# Patient Record
Sex: MALE | Race: WHITE | NOT HISPANIC OR LATINO | Employment: OTHER | URBAN - METROPOLITAN AREA
[De-identification: names, ages, dates, MRNs, and addresses within clinical notes are randomized per-mention and may not be internally consistent; named-entity substitution may affect disease eponyms.]

---

## 2017-04-18 ENCOUNTER — OFFICE VISIT - RIVER FALLS (OUTPATIENT)
Dept: FAMILY MEDICINE | Facility: CLINIC | Age: 72
End: 2017-04-18

## 2017-04-18 ASSESSMENT — MIFFLIN-ST. JEOR: SCORE: 1482.88

## 2017-04-20 ENCOUNTER — OFFICE VISIT - RIVER FALLS (OUTPATIENT)
Dept: FAMILY MEDICINE | Facility: CLINIC | Age: 72
End: 2017-04-20

## 2017-04-20 ENCOUNTER — TRANSFERRED RECORDS (OUTPATIENT)
Dept: HEALTH INFORMATION MANAGEMENT | Facility: CLINIC | Age: 72
End: 2017-04-20

## 2017-04-20 LAB — GLUCOSE (EXTERNAL): 220 MG/DL (ref 65–99)

## 2017-05-30 ENCOUNTER — OFFICE VISIT - RIVER FALLS (OUTPATIENT)
Dept: FAMILY MEDICINE | Facility: CLINIC | Age: 72
End: 2017-05-30

## 2017-05-30 ASSESSMENT — MIFFLIN-ST. JEOR: SCORE: 1495.36

## 2017-06-13 ENCOUNTER — OFFICE VISIT - RIVER FALLS (OUTPATIENT)
Dept: FAMILY MEDICINE | Facility: CLINIC | Age: 72
End: 2017-06-13

## 2017-08-03 ENCOUNTER — AMBULATORY - RIVER FALLS (OUTPATIENT)
Dept: FAMILY MEDICINE | Facility: CLINIC | Age: 72
End: 2017-08-03

## 2017-08-04 LAB
CHOLEST SERPL-MCNC: 195 MG/DL (ref 125–200)
CHOLEST/HDLC SERPL: 2.2 {RATIO}
HBA1C MFR BLD: 8 %
HDLC SERPL-MCNC: 89 MG/DL
LDLC SERPL CALC-MCNC: 98 MG/DL
NONHDLC SERPL-MCNC: 106 MG/DL
TRIGL SERPL-MCNC: 38 MG/DL

## 2017-08-11 ENCOUNTER — OFFICE VISIT - RIVER FALLS (OUTPATIENT)
Dept: FAMILY MEDICINE | Facility: CLINIC | Age: 72
End: 2017-08-11

## 2017-08-11 ASSESSMENT — MIFFLIN-ST. JEOR: SCORE: 1506.7

## 2017-09-01 ENCOUNTER — OFFICE VISIT - RIVER FALLS (OUTPATIENT)
Dept: FAMILY MEDICINE | Facility: CLINIC | Age: 72
End: 2017-09-01

## 2017-09-01 ASSESSMENT — MIFFLIN-ST. JEOR: SCORE: 1516.9

## 2017-12-21 ENCOUNTER — AMBULATORY - RIVER FALLS (OUTPATIENT)
Dept: FAMILY MEDICINE | Facility: CLINIC | Age: 72
End: 2017-12-21

## 2017-12-22 ENCOUNTER — OFFICE VISIT - RIVER FALLS (OUTPATIENT)
Dept: FAMILY MEDICINE | Facility: CLINIC | Age: 72
End: 2017-12-22

## 2017-12-22 LAB — HBA1C MFR BLD: 8 %

## 2017-12-22 ASSESSMENT — MIFFLIN-ST. JEOR: SCORE: 1532.78

## 2017-12-28 ENCOUNTER — OFFICE VISIT - RIVER FALLS (OUTPATIENT)
Dept: FAMILY MEDICINE | Facility: CLINIC | Age: 72
End: 2017-12-28

## 2018-02-01 ENCOUNTER — OFFICE VISIT - RIVER FALLS (OUTPATIENT)
Dept: FAMILY MEDICINE | Facility: CLINIC | Age: 73
End: 2018-02-01

## 2018-05-07 ENCOUNTER — AMBULATORY - RIVER FALLS (OUTPATIENT)
Dept: FAMILY MEDICINE | Facility: CLINIC | Age: 73
End: 2018-05-07

## 2018-05-08 LAB
CREAT SERPL-MCNC: 1 MG/DL (ref 0.7–1.18)
GLUCOSE BLD-MCNC: 429 MG/DL (ref 65–99)
HBA1C MFR BLD: 7.7 %

## 2018-05-15 ENCOUNTER — OFFICE VISIT - RIVER FALLS (OUTPATIENT)
Dept: FAMILY MEDICINE | Facility: CLINIC | Age: 73
End: 2018-05-15

## 2018-09-14 ENCOUNTER — AMBULATORY - RIVER FALLS (OUTPATIENT)
Dept: FAMILY MEDICINE | Facility: CLINIC | Age: 73
End: 2018-09-14

## 2018-09-15 LAB
CHOLEST SERPL-MCNC: 197 MG/DL
CHOLEST/HDLC SERPL: 2.4 {RATIO}
HBA1C MFR BLD: 8.1 %
HDLC SERPL-MCNC: 82 MG/DL
LDLC SERPL CALC-MCNC: 100 MG/DL
NONHDLC SERPL-MCNC: 115 MG/DL
TRIGL SERPL-MCNC: 65 MG/DL

## 2018-09-20 ENCOUNTER — OFFICE VISIT - RIVER FALLS (OUTPATIENT)
Dept: FAMILY MEDICINE | Facility: CLINIC | Age: 73
End: 2018-09-20

## 2019-04-26 ENCOUNTER — AMBULATORY - RIVER FALLS (OUTPATIENT)
Dept: FAMILY MEDICINE | Facility: CLINIC | Age: 74
End: 2019-04-26

## 2019-04-27 LAB
BUN SERPL-MCNC: 18 MG/DL (ref 7–25)
BUN/CREAT RATIO - HISTORICAL: ABNORMAL (ref 6–22)
CALCIUM SERPL-MCNC: 9.3 MG/DL (ref 8.6–10.3)
CHLORIDE BLD-SCNC: 100 MMOL/L (ref 98–110)
CO2 SERPL-SCNC: 26 MMOL/L (ref 20–32)
CREAT SERPL-MCNC: 0.9 MG/DL (ref 0.7–1.18)
CREAT UR-MCNC: 152 MG/DL (ref 20–320)
EGFRCR SERPLBLD CKD-EPI 2021: 84 ML/MIN/1.73M2
GLUCOSE BLD-MCNC: 209 MG/DL (ref 65–99)
HBA1C MFR BLD: 8.3 %
MICROALBUMIN UR-MCNC: 0.3 MG/DL
MICROALBUMIN/CREAT UR: 2 MG/G{CREAT}
POTASSIUM BLD-SCNC: 4.4 MMOL/L (ref 3.5–5.3)
SODIUM SERPL-SCNC: 135 MMOL/L (ref 135–146)

## 2019-05-03 ENCOUNTER — OFFICE VISIT - RIVER FALLS (OUTPATIENT)
Dept: FAMILY MEDICINE | Facility: CLINIC | Age: 74
End: 2019-05-03

## 2019-06-03 ENCOUNTER — OFFICE VISIT - RIVER FALLS (OUTPATIENT)
Dept: FAMILY MEDICINE | Facility: CLINIC | Age: 74
End: 2019-06-03

## 2019-06-03 ASSESSMENT — MIFFLIN-ST. JEOR: SCORE: 1533.01

## 2019-06-06 ENCOUNTER — OFFICE VISIT - RIVER FALLS (OUTPATIENT)
Dept: FAMILY MEDICINE | Facility: CLINIC | Age: 74
End: 2019-06-06

## 2019-06-07 ENCOUNTER — COMMUNICATION - RIVER FALLS (OUTPATIENT)
Dept: FAMILY MEDICINE | Facility: CLINIC | Age: 74
End: 2019-06-07

## 2019-06-14 ENCOUNTER — COMMUNICATION - RIVER FALLS (OUTPATIENT)
Dept: FAMILY MEDICINE | Facility: CLINIC | Age: 74
End: 2019-06-14

## 2019-06-24 ENCOUNTER — COMMUNICATION - RIVER FALLS (OUTPATIENT)
Dept: FAMILY MEDICINE | Facility: CLINIC | Age: 74
End: 2019-06-24

## 2019-06-25 ENCOUNTER — COMMUNICATION - RIVER FALLS (OUTPATIENT)
Dept: FAMILY MEDICINE | Facility: CLINIC | Age: 74
End: 2019-06-25

## 2019-07-08 ENCOUNTER — OFFICE VISIT - RIVER FALLS (OUTPATIENT)
Dept: FAMILY MEDICINE | Facility: CLINIC | Age: 74
End: 2019-07-08

## 2019-07-08 ASSESSMENT — MIFFLIN-ST. JEOR: SCORE: 1521.21

## 2019-09-13 ENCOUNTER — AMBULATORY - RIVER FALLS (OUTPATIENT)
Dept: FAMILY MEDICINE | Facility: CLINIC | Age: 74
End: 2019-09-13

## 2019-09-14 LAB
CHOLEST SERPL-MCNC: 205 MG/DL
CHOLEST/HDLC SERPL: 2.9 {RATIO}
HBA1C MFR BLD: 8.5 %
HDLC SERPL-MCNC: 71 MG/DL
LDLC SERPL CALC-MCNC: 118 MG/DL
NONHDLC SERPL-MCNC: 134 MG/DL
TRIGL SERPL-MCNC: 68 MG/DL
TSH SERPL DL<=0.005 MIU/L-ACNC: 0.1 MIU/L (ref 0.4–4.5)

## 2019-09-16 ENCOUNTER — COMMUNICATION - RIVER FALLS (OUTPATIENT)
Dept: FAMILY MEDICINE | Facility: CLINIC | Age: 74
End: 2019-09-16

## 2019-09-20 ENCOUNTER — OFFICE VISIT - RIVER FALLS (OUTPATIENT)
Dept: FAMILY MEDICINE | Facility: CLINIC | Age: 74
End: 2019-09-20

## 2019-09-23 ENCOUNTER — COMMUNICATION - RIVER FALLS (OUTPATIENT)
Dept: FAMILY MEDICINE | Facility: CLINIC | Age: 74
End: 2019-09-23

## 2019-09-24 ENCOUNTER — COMMUNICATION - RIVER FALLS (OUTPATIENT)
Dept: FAMILY MEDICINE | Facility: CLINIC | Age: 74
End: 2019-09-24

## 2019-09-25 ENCOUNTER — COMMUNICATION - RIVER FALLS (OUTPATIENT)
Dept: FAMILY MEDICINE | Facility: CLINIC | Age: 74
End: 2019-09-25

## 2019-09-26 ENCOUNTER — COMMUNICATION - RIVER FALLS (OUTPATIENT)
Dept: FAMILY MEDICINE | Facility: CLINIC | Age: 74
End: 2019-09-26

## 2019-10-01 ENCOUNTER — COMMUNICATION - RIVER FALLS (OUTPATIENT)
Dept: FAMILY MEDICINE | Facility: CLINIC | Age: 74
End: 2019-10-01

## 2019-10-04 ENCOUNTER — COMMUNICATION - RIVER FALLS (OUTPATIENT)
Dept: FAMILY MEDICINE | Facility: CLINIC | Age: 74
End: 2019-10-04

## 2019-11-19 ENCOUNTER — COMMUNICATION - RIVER FALLS (OUTPATIENT)
Dept: FAMILY MEDICINE | Facility: CLINIC | Age: 74
End: 2019-11-19

## 2019-11-22 ENCOUNTER — COMMUNICATION - RIVER FALLS (OUTPATIENT)
Dept: FAMILY MEDICINE | Facility: CLINIC | Age: 74
End: 2019-11-22

## 2019-11-25 ENCOUNTER — COMMUNICATION - RIVER FALLS (OUTPATIENT)
Dept: FAMILY MEDICINE | Facility: CLINIC | Age: 74
End: 2019-11-25

## 2019-11-25 ENCOUNTER — OFFICE VISIT - RIVER FALLS (OUTPATIENT)
Dept: FAMILY MEDICINE | Facility: CLINIC | Age: 74
End: 2019-11-25

## 2019-11-25 ASSESSMENT — MIFFLIN-ST. JEOR: SCORE: 1542.99

## 2019-12-02 ENCOUNTER — OFFICE VISIT - RIVER FALLS (OUTPATIENT)
Dept: FAMILY MEDICINE | Facility: CLINIC | Age: 74
End: 2019-12-02

## 2019-12-02 ASSESSMENT — MIFFLIN-ST. JEOR: SCORE: 1545.71

## 2020-01-02 ENCOUNTER — COMMUNICATION - RIVER FALLS (OUTPATIENT)
Dept: FAMILY MEDICINE | Facility: CLINIC | Age: 75
End: 2020-01-02

## 2020-05-05 ENCOUNTER — AMBULATORY - RIVER FALLS (OUTPATIENT)
Dept: FAMILY MEDICINE | Facility: CLINIC | Age: 75
End: 2020-05-05

## 2020-05-05 ENCOUNTER — OFFICE VISIT - RIVER FALLS (OUTPATIENT)
Dept: FAMILY MEDICINE | Facility: CLINIC | Age: 75
End: 2020-05-05

## 2020-05-05 ASSESSMENT — MIFFLIN-ST. JEOR: SCORE: 1545.71

## 2020-05-06 LAB
BUN SERPL-MCNC: 18 MG/DL (ref 7–25)
BUN/CREAT RATIO - HISTORICAL: ABNORMAL (ref 6–22)
CALCIUM SERPL-MCNC: 9.4 MG/DL (ref 8.6–10.3)
CHLORIDE BLD-SCNC: 100 MMOL/L (ref 98–110)
CO2 SERPL-SCNC: 30 MMOL/L (ref 20–32)
CREAT SERPL-MCNC: 1 MG/DL (ref 0.7–1.18)
CREAT UR-MCNC: 74 MG/DL (ref 20–320)
EGFRCR SERPLBLD CKD-EPI 2021: 74 ML/MIN/1.73M2
GLUCOSE BLD-MCNC: 146 MG/DL (ref 65–99)
HBA1C MFR BLD: 8.4 %
MICROALBUMIN UR-MCNC: 0.2 MG/DL
MICROALBUMIN/CREAT UR: 3 MG/G{CREAT}
POTASSIUM BLD-SCNC: 4 MMOL/L (ref 3.5–5.3)
SODIUM SERPL-SCNC: 136 MMOL/L (ref 135–146)
TSH SERPL DL<=0.005 MIU/L-ACNC: 0.08 MIU/L (ref 0.4–4.5)

## 2020-05-08 ENCOUNTER — COMMUNICATION - RIVER FALLS (OUTPATIENT)
Dept: FAMILY MEDICINE | Facility: CLINIC | Age: 75
End: 2020-05-08

## 2020-05-12 ENCOUNTER — OFFICE VISIT - RIVER FALLS (OUTPATIENT)
Dept: FAMILY MEDICINE | Facility: CLINIC | Age: 75
End: 2020-05-12

## 2020-05-19 ENCOUNTER — OFFICE VISIT - RIVER FALLS (OUTPATIENT)
Dept: FAMILY MEDICINE | Facility: CLINIC | Age: 75
End: 2020-05-19

## 2020-07-09 ENCOUNTER — OFFICE VISIT - RIVER FALLS (OUTPATIENT)
Dept: FAMILY MEDICINE | Facility: CLINIC | Age: 75
End: 2020-07-09

## 2020-07-09 ASSESSMENT — MIFFLIN-ST. JEOR: SCORE: 1563.85

## 2020-08-07 ENCOUNTER — COMMUNICATION - RIVER FALLS (OUTPATIENT)
Dept: FAMILY MEDICINE | Facility: CLINIC | Age: 75
End: 2020-08-07

## 2020-08-26 ENCOUNTER — COMMUNICATION - RIVER FALLS (OUTPATIENT)
Dept: FAMILY MEDICINE | Facility: CLINIC | Age: 75
End: 2020-08-26

## 2020-09-01 ENCOUNTER — COMMUNICATION - RIVER FALLS (OUTPATIENT)
Dept: FAMILY MEDICINE | Facility: CLINIC | Age: 75
End: 2020-09-01

## 2020-09-02 ENCOUNTER — COMMUNICATION - RIVER FALLS (OUTPATIENT)
Dept: FAMILY MEDICINE | Facility: CLINIC | Age: 75
End: 2020-09-02

## 2020-09-09 ENCOUNTER — OFFICE VISIT - RIVER FALLS (OUTPATIENT)
Dept: FAMILY MEDICINE | Facility: CLINIC | Age: 75
End: 2020-09-09

## 2020-09-09 ASSESSMENT — MIFFLIN-ST. JEOR: SCORE: 1562.04

## 2020-09-14 ENCOUNTER — COMMUNICATION - RIVER FALLS (OUTPATIENT)
Dept: FAMILY MEDICINE | Facility: CLINIC | Age: 75
End: 2020-09-14

## 2020-09-25 ENCOUNTER — COMMUNICATION - RIVER FALLS (OUTPATIENT)
Dept: FAMILY MEDICINE | Facility: CLINIC | Age: 75
End: 2020-09-25

## 2020-11-23 ENCOUNTER — COMMUNICATION - RIVER FALLS (OUTPATIENT)
Dept: FAMILY MEDICINE | Facility: CLINIC | Age: 75
End: 2020-11-23

## 2020-11-24 ENCOUNTER — AMBULATORY - RIVER FALLS (OUTPATIENT)
Dept: FAMILY MEDICINE | Facility: CLINIC | Age: 75
End: 2020-11-24

## 2020-11-25 ENCOUNTER — COMMUNICATION - RIVER FALLS (OUTPATIENT)
Dept: FAMILY MEDICINE | Facility: CLINIC | Age: 75
End: 2020-11-25

## 2020-11-25 LAB
CHOLEST SERPL-MCNC: 211 MG/DL
CHOLEST/HDLC SERPL: 2.5 {RATIO}
HBA1C MFR BLD: 8.2 %
HDLC SERPL-MCNC: 86 MG/DL
LDLC SERPL CALC-MCNC: 111 MG/DL
NONHDLC SERPL-MCNC: 125 MG/DL
TRIGL SERPL-MCNC: 58 MG/DL
TSH SERPL DL<=0.005 MIU/L-ACNC: 0.25 MIU/L (ref 0.4–4.5)

## 2020-12-01 ENCOUNTER — OFFICE VISIT - RIVER FALLS (OUTPATIENT)
Dept: FAMILY MEDICINE | Facility: CLINIC | Age: 75
End: 2020-12-01

## 2020-12-01 ASSESSMENT — MIFFLIN-ST. JEOR: SCORE: 1561.81

## 2020-12-17 ENCOUNTER — OFFICE VISIT - RIVER FALLS (OUTPATIENT)
Dept: FAMILY MEDICINE | Facility: CLINIC | Age: 75
End: 2020-12-17

## 2020-12-17 ASSESSMENT — MIFFLIN-ST. JEOR: SCORE: 1556.37

## 2021-03-30 ENCOUNTER — COMMUNICATION - RIVER FALLS (OUTPATIENT)
Dept: FAMILY MEDICINE | Facility: CLINIC | Age: 76
End: 2021-03-30

## 2021-03-31 ENCOUNTER — AMBULATORY - RIVER FALLS (OUTPATIENT)
Dept: FAMILY MEDICINE | Facility: CLINIC | Age: 76
End: 2021-03-31

## 2021-03-31 ENCOUNTER — OFFICE VISIT - RIVER FALLS (OUTPATIENT)
Dept: FAMILY MEDICINE | Facility: CLINIC | Age: 76
End: 2021-03-31

## 2021-03-31 ASSESSMENT — MIFFLIN-ST. JEOR: SCORE: 1546.39

## 2021-04-23 ENCOUNTER — OFFICE VISIT - RIVER FALLS (OUTPATIENT)
Dept: FAMILY MEDICINE | Facility: CLINIC | Age: 76
End: 2021-04-23

## 2021-04-23 LAB
ERYTHROCYTE [DISTWIDTH] IN BLOOD BY AUTOMATED COUNT: 13.7 % (ref 11–15)
HCT VFR BLD AUTO: 44.8 % (ref 38.5–50)
HGB BLD-MCNC: 15.4 GM/DL (ref 13.2–17.1)
MCH RBC QN AUTO: 33.5 PG (ref 27–33)
MCHC RBC AUTO-ENTMCNC: 34.4 GM/DL (ref 32–36)
MCV RBC AUTO: 97.4 FL (ref 80–100)
PLATELET # BLD AUTO: 236 10*3/UL (ref 140–400)
PMV BLD: 10.2 FL (ref 7.5–12.5)
RBC # BLD AUTO: 4.6 10*6/UL (ref 4.2–5.8)
WBC # BLD AUTO: 9.4 10*3/UL (ref 3.8–10.8)

## 2021-04-23 ASSESSMENT — MIFFLIN-ST. JEOR: SCORE: 1550.93

## 2021-04-24 ENCOUNTER — COMMUNICATION - RIVER FALLS (OUTPATIENT)
Dept: FAMILY MEDICINE | Facility: CLINIC | Age: 76
End: 2021-04-24

## 2021-04-24 LAB
BUN SERPL-MCNC: 18 MG/DL (ref 7–25)
BUN/CREAT RATIO - HISTORICAL: ABNORMAL (ref 6–22)
CALCIUM SERPL-MCNC: 9.4 MG/DL (ref 8.6–10.3)
CHLORIDE BLD-SCNC: 102 MMOL/L (ref 98–110)
CO2 SERPL-SCNC: 29 MMOL/L (ref 20–32)
CREAT SERPL-MCNC: 0.94 MG/DL (ref 0.7–1.18)
CREAT UR-MCNC: 97 MG/DL (ref 20–320)
EGFRCR SERPLBLD CKD-EPI 2021: 79 ML/MIN/1.73M2
GLUCOSE BLD-MCNC: 159 MG/DL (ref 65–99)
HBA1C MFR BLD: 8.5 %
MICROALBUMIN UR-MCNC: 0.6 MG/DL
MICROALBUMIN/CREAT UR: 6 MG/G{CREAT}
POTASSIUM BLD-SCNC: 4.2 MMOL/L (ref 3.5–5.3)
SODIUM SERPL-SCNC: 137 MMOL/L (ref 135–146)
TSH SERPL DL<=0.005 MIU/L-ACNC: 1.21 MIU/L (ref 0.4–4.5)

## 2021-04-28 ENCOUNTER — AMBULATORY - RIVER FALLS (OUTPATIENT)
Dept: FAMILY MEDICINE | Facility: CLINIC | Age: 76
End: 2021-04-28

## 2021-04-29 ENCOUNTER — OFFICE VISIT - RIVER FALLS (OUTPATIENT)
Dept: FAMILY MEDICINE | Facility: CLINIC | Age: 76
End: 2021-04-29

## 2021-04-29 ASSESSMENT — MIFFLIN-ST. JEOR: SCORE: 1544.58

## 2021-06-02 ENCOUNTER — OFFICE VISIT - RIVER FALLS (OUTPATIENT)
Dept: FAMILY MEDICINE | Facility: CLINIC | Age: 76
End: 2021-06-02

## 2021-06-02 ASSESSMENT — MIFFLIN-ST. JEOR: SCORE: 1525.98

## 2021-06-14 ENCOUNTER — COMMUNICATION - RIVER FALLS (OUTPATIENT)
Dept: FAMILY MEDICINE | Facility: CLINIC | Age: 76
End: 2021-06-14

## 2021-08-10 ENCOUNTER — OFFICE VISIT - RIVER FALLS (OUTPATIENT)
Dept: FAMILY MEDICINE | Facility: CLINIC | Age: 76
End: 2021-08-10

## 2021-08-10 ASSESSMENT — MIFFLIN-ST. JEOR: SCORE: 1526.43

## 2021-08-16 ENCOUNTER — COMMUNICATION - RIVER FALLS (OUTPATIENT)
Dept: FAMILY MEDICINE | Facility: CLINIC | Age: 76
End: 2021-08-16

## 2021-08-19 ENCOUNTER — OFFICE VISIT - RIVER FALLS (OUTPATIENT)
Dept: FAMILY MEDICINE | Facility: CLINIC | Age: 76
End: 2021-08-19

## 2021-08-19 ASSESSMENT — MIFFLIN-ST. JEOR: SCORE: 1547.75

## 2021-08-20 ENCOUNTER — COMMUNICATION - RIVER FALLS (OUTPATIENT)
Dept: FAMILY MEDICINE | Facility: CLINIC | Age: 76
End: 2021-08-20

## 2021-08-20 LAB
BUN SERPL-MCNC: 15 MG/DL (ref 7–25)
BUN/CREAT RATIO - HISTORICAL: ABNORMAL (ref 6–22)
CALCIUM SERPL-MCNC: 8.8 MG/DL (ref 8.6–10.3)
CHLORIDE BLD-SCNC: 101 MMOL/L (ref 98–110)
CO2 SERPL-SCNC: 25 MMOL/L (ref 20–32)
CREAT SERPL-MCNC: 0.87 MG/DL (ref 0.7–1.18)
EGFRCR SERPLBLD CKD-EPI 2021: 84 ML/MIN/1.73M2
GLUCOSE BLD-MCNC: 230 MG/DL (ref 65–99)
HBA1C MFR BLD: 7.8 %
POTASSIUM BLD-SCNC: 4.5 MMOL/L (ref 3.5–5.3)
SODIUM SERPL-SCNC: 134 MMOL/L (ref 135–146)

## 2021-08-30 ENCOUNTER — TRANSFERRED RECORDS (OUTPATIENT)
Dept: MULTI SPECIALTY CLINIC | Facility: CLINIC | Age: 76
End: 2021-08-30

## 2021-08-30 LAB — RETINOPATHY: NORMAL

## 2021-09-21 ENCOUNTER — COMMUNICATION - RIVER FALLS (OUTPATIENT)
Dept: FAMILY MEDICINE | Facility: CLINIC | Age: 76
End: 2021-09-21

## 2021-09-22 ENCOUNTER — OFFICE VISIT - RIVER FALLS (OUTPATIENT)
Dept: FAMILY MEDICINE | Facility: CLINIC | Age: 76
End: 2021-09-22

## 2021-09-22 ASSESSMENT — MIFFLIN-ST. JEOR: SCORE: 1550.47

## 2021-10-14 ENCOUNTER — COMMUNICATION - RIVER FALLS (OUTPATIENT)
Dept: FAMILY MEDICINE | Facility: CLINIC | Age: 76
End: 2021-10-14

## 2021-11-01 ENCOUNTER — OFFICE VISIT - RIVER FALLS (OUTPATIENT)
Dept: FAMILY MEDICINE | Facility: CLINIC | Age: 76
End: 2021-11-01

## 2021-11-01 ASSESSMENT — MIFFLIN-ST. JEOR: SCORE: 1537.32

## 2021-11-04 ENCOUNTER — COMMUNICATION - RIVER FALLS (OUTPATIENT)
Dept: FAMILY MEDICINE | Facility: CLINIC | Age: 76
End: 2021-11-04

## 2021-11-16 ENCOUNTER — AMBULATORY - RIVER FALLS (OUTPATIENT)
Dept: FAMILY MEDICINE | Facility: CLINIC | Age: 76
End: 2021-11-16

## 2021-11-17 ENCOUNTER — COMMUNICATION - RIVER FALLS (OUTPATIENT)
Dept: FAMILY MEDICINE | Facility: CLINIC | Age: 76
End: 2021-11-17

## 2021-11-17 ENCOUNTER — AMBULATORY - RIVER FALLS (OUTPATIENT)
Dept: FAMILY MEDICINE | Facility: CLINIC | Age: 76
End: 2021-11-17

## 2021-11-17 LAB
CHOLEST SERPL-MCNC: 220 MG/DL
CHOLEST/HDLC SERPL: 3 {RATIO}
HBA1C MFR BLD: 7.6 %
HDLC SERPL-MCNC: 73 MG/DL
LDLC SERPL CALC-MCNC: 127 MG/DL
NONHDLC SERPL-MCNC: 147 MG/DL
TRIGL SERPL-MCNC: 96 MG/DL
TSH SERPL DL<=0.005 MIU/L-ACNC: 1.68 MIU/L (ref 0.4–4.5)

## 2021-11-23 ENCOUNTER — OFFICE VISIT - RIVER FALLS (OUTPATIENT)
Dept: FAMILY MEDICINE | Facility: CLINIC | Age: 76
End: 2021-11-23

## 2021-11-23 ASSESSMENT — MIFFLIN-ST. JEOR: SCORE: 1562.27

## 2021-12-21 ENCOUNTER — OFFICE VISIT - RIVER FALLS (OUTPATIENT)
Dept: FAMILY MEDICINE | Facility: CLINIC | Age: 76
End: 2021-12-21

## 2021-12-21 ASSESSMENT — MIFFLIN-ST. JEOR: SCORE: 1564.54

## 2021-12-28 ENCOUNTER — COMMUNICATION - RIVER FALLS (OUTPATIENT)
Dept: FAMILY MEDICINE | Facility: CLINIC | Age: 76
End: 2021-12-28

## 2022-02-12 VITALS
WEIGHT: 182 LBS | SYSTOLIC BLOOD PRESSURE: 126 MMHG | WEIGHT: 182.6 LBS | RESPIRATION RATE: 16 BRPM | HEIGHT: 69 IN | BODY MASS INDEX: 26.96 KG/M2 | BODY MASS INDEX: 27.05 KG/M2 | HEIGHT: 69 IN | TEMPERATURE: 98 F | HEART RATE: 88 BPM | OXYGEN SATURATION: 98 % | DIASTOLIC BLOOD PRESSURE: 64 MMHG

## 2022-02-12 VITALS
BODY MASS INDEX: 29.44 KG/M2 | DIASTOLIC BLOOD PRESSURE: 81 MMHG | HEART RATE: 72 BPM | HEIGHT: 67 IN | SYSTOLIC BLOOD PRESSURE: 147 MMHG | WEIGHT: 187.6 LBS | HEIGHT: 67 IN | HEIGHT: 67 IN | WEIGHT: 189 LBS | BODY MASS INDEX: 29.51 KG/M2 | OXYGEN SATURATION: 100 % | HEART RATE: 86 BPM | TEMPERATURE: 97.7 F | RESPIRATION RATE: 16 BRPM | TEMPERATURE: 96.4 F | SYSTOLIC BLOOD PRESSURE: 136 MMHG | DIASTOLIC BLOOD PRESSURE: 72 MMHG | BODY MASS INDEX: 29.66 KG/M2 | WEIGHT: 188 LBS

## 2022-02-12 VITALS
WEIGHT: 186 LBS | HEART RATE: 68 BPM | OXYGEN SATURATION: 98 % | HEIGHT: 67 IN | SYSTOLIC BLOOD PRESSURE: 146 MMHG | DIASTOLIC BLOOD PRESSURE: 76 MMHG | WEIGHT: 188.3 LBS | BODY MASS INDEX: 29.65 KG/M2 | BODY MASS INDEX: 29.19 KG/M2 | BODY MASS INDEX: 29.55 KG/M2 | OXYGEN SATURATION: 97 % | WEIGHT: 188.9 LBS | HEIGHT: 67 IN | HEART RATE: 72 BPM | DIASTOLIC BLOOD PRESSURE: 92 MMHG | HEIGHT: 67 IN | TEMPERATURE: 97.2 F | SYSTOLIC BLOOD PRESSURE: 160 MMHG | TEMPERATURE: 97.6 F

## 2022-02-12 VITALS
HEART RATE: 64 BPM | SYSTOLIC BLOOD PRESSURE: 136 MMHG | DIASTOLIC BLOOD PRESSURE: 70 MMHG | TEMPERATURE: 97.7 F | WEIGHT: 181.8 LBS | BODY MASS INDEX: 27.24 KG/M2 | DIASTOLIC BLOOD PRESSURE: 68 MMHG | SYSTOLIC BLOOD PRESSURE: 128 MMHG

## 2022-02-12 VITALS
SYSTOLIC BLOOD PRESSURE: 136 MMHG | HEIGHT: 67 IN | WEIGHT: 191.4 LBS | BODY MASS INDEX: 29.85 KG/M2 | OXYGEN SATURATION: 98 % | DIASTOLIC BLOOD PRESSURE: 78 MMHG | BODY MASS INDEX: 30.04 KG/M2 | WEIGHT: 190.2 LBS | TEMPERATURE: 96.8 F | HEART RATE: 77 BPM | HEIGHT: 67 IN

## 2022-02-12 VITALS
BODY MASS INDEX: 25.4 KG/M2 | SYSTOLIC BLOOD PRESSURE: 130 MMHG | DIASTOLIC BLOOD PRESSURE: 76 MMHG | HEIGHT: 69 IN | WEIGHT: 168.75 LBS | WEIGHT: 171.5 LBS | HEIGHT: 69 IN | BODY MASS INDEX: 24.99 KG/M2

## 2022-02-12 VITALS
WEIGHT: 176.25 LBS | DIASTOLIC BLOOD PRESSURE: 60 MMHG | HEIGHT: 69 IN | HEIGHT: 69 IN | WEIGHT: 174 LBS | SYSTOLIC BLOOD PRESSURE: 126 MMHG | RESPIRATION RATE: 16 BRPM | HEART RATE: 80 BPM | BODY MASS INDEX: 25.77 KG/M2 | TEMPERATURE: 96.4 F | BODY MASS INDEX: 26.11 KG/M2

## 2022-02-12 VITALS
BODY MASS INDEX: 26.63 KG/M2 | WEIGHT: 179.8 LBS | BODY MASS INDEX: 26.25 KG/M2 | HEIGHT: 69 IN | WEIGHT: 177.2 LBS | HEIGHT: 69 IN

## 2022-02-12 VITALS
HEIGHT: 69 IN | SYSTOLIC BLOOD PRESSURE: 173 MMHG | WEIGHT: 182.6 LBS | HEIGHT: 69 IN | DIASTOLIC BLOOD PRESSURE: 77 MMHG | HEART RATE: 69 BPM | BODY MASS INDEX: 27.05 KG/M2 | BODY MASS INDEX: 27.36 KG/M2

## 2022-02-12 VITALS
HEIGHT: 69 IN | HEART RATE: 64 BPM | DIASTOLIC BLOOD PRESSURE: 80 MMHG | BODY MASS INDEX: 26.62 KG/M2 | TEMPERATURE: 97 F | SYSTOLIC BLOOD PRESSURE: 160 MMHG | WEIGHT: 183.2 LBS | WEIGHT: 179.75 LBS | BODY MASS INDEX: 27.45 KG/M2

## 2022-02-12 VITALS
HEART RATE: 81 BPM | HEIGHT: 67 IN | WEIGHT: 183.6 LBS | DIASTOLIC BLOOD PRESSURE: 78 MMHG | BODY MASS INDEX: 28.82 KG/M2 | BODY MASS INDEX: 28.8 KG/M2 | OXYGEN SATURATION: 99 % | WEIGHT: 183.5 LBS | HEIGHT: 67 IN | TEMPERATURE: 98.3 F | SYSTOLIC BLOOD PRESSURE: 132 MMHG

## 2022-02-12 VITALS
WEIGHT: 180 LBS | BODY MASS INDEX: 26.97 KG/M2 | HEART RATE: 88 BPM | SYSTOLIC BLOOD PRESSURE: 132 MMHG | DIASTOLIC BLOOD PRESSURE: 60 MMHG | TEMPERATURE: 98 F

## 2022-02-12 VITALS
HEIGHT: 69 IN | TEMPERATURE: 96.6 F | WEIGHT: 182 LBS | SYSTOLIC BLOOD PRESSURE: 130 MMHG | HEART RATE: 60 BPM | BODY MASS INDEX: 26.96 KG/M2 | DIASTOLIC BLOOD PRESSURE: 60 MMHG

## 2022-02-12 VITALS
DIASTOLIC BLOOD PRESSURE: 63 MMHG | SYSTOLIC BLOOD PRESSURE: 125 MMHG | BODY MASS INDEX: 27.24 KG/M2 | WEIGHT: 181.8 LBS | TEMPERATURE: 97.5 F | HEART RATE: 74 BPM

## 2022-02-12 VITALS
HEART RATE: 94 BPM | BODY MASS INDEX: 30.06 KG/M2 | TEMPERATURE: 97 F | HEIGHT: 67 IN | DIASTOLIC BLOOD PRESSURE: 79 MMHG | HEIGHT: 67 IN | SYSTOLIC BLOOD PRESSURE: 144 MMHG | WEIGHT: 191.5 LBS | BODY MASS INDEX: 30.13 KG/M2 | WEIGHT: 192 LBS | OXYGEN SATURATION: 100 %

## 2022-02-12 VITALS — WEIGHT: 186.2 LBS | BODY MASS INDEX: 27.58 KG/M2 | HEIGHT: 69 IN

## 2022-02-12 VITALS — HEIGHT: 69 IN | WEIGHT: 186.6 LBS | BODY MASS INDEX: 27.64 KG/M2

## 2022-02-15 NOTE — PROGRESS NOTES
Patient:   ROSALIO ANDRADE            MRN: [id number]            FIN: [id number]               Age:   76 years     Sex:  Male     :  1945   Associated Diagnoses:   Diabetes mellitus type 1; Hyperlipidemia NOS; Overweight (BMI 25.0-29.9)   Author:   Rupal White      Visit Information   Visit type:  Diabetes Self Management Education .    Referral source:  Richar RIZZO, Kb.       Chief Complaint   Uncontrolled DM Type 1 (ROLANDO), Hyperlipidemia, Overweight       History of Present Illness   Discussed nutrition, diabetes, and lifestyle management with pt.  Pt started Medtronic 630G pump on 17. Pt started Dexcom G6 CGMS 2020    a1c 8.5% = 197 eAG; above pt's personal target of <8% and higher than ADA goal of <7%.    Pt really likes the Dexcom G6, benefits from the low alarms and states he can treat a low when the alarm goes off   Nutrition:  does not follow a low carb plan consuming 50 - 80gm carbs per meal, wide variety of foods with adequate fruit and vegetables   Physical Activity: significantly decreased due to a fall requiring left shoulder rotator cuff repair on 5/3/2021.  Pt has spoken with surgical team and can leave on pump and sensor.   Still trying to take a daily walk   Stress:   moderate, concerned about surgery   Sleep: good  Support: wife  Insulin:  Novolog via pump    BG Testing: ~ 4+x/ day if no sensor available otherwise uses sensor   Routine diabetes care:  eye exam due (2020), dental, and foot exams - no concerns    ICR  12:00      6  15:00     6.5     Sensitivity  0:00   37    Target   120 - 120     Basal:   18.1 increased to 21.175  MN   0.500 0.70    3am   0.70  0.85    6:30am  0.900 0.95  13:00 0.900 0.95  18:00 0.70  0.95  22:00  0.50  0.95    Insulin action  3.5 hours  Max Bolus    17u  Max Basal   1u/ hr     30 day Average sensor glucose 199 mg/dL (98.5% CGM active)     Time in target range 70 - 180  47.2% (goal 70%+)  High 181 - 250    52.5%  Very High >250    21.5%  Low 54 -  69     0.3%  Very low <54     0.0%    Basal 37%  Bolus 63%    Total daily dose 48.6u +/- 4.4u         Health Status   Allergies:    Allergic Reactions (Selected)  No known allergies   Medications:  (Selected)   Prescriptions  Prescribed  CONTOUR NEXT TEST STRIP: See Instructions, Instructions: TEST 4 TIMES A DAY, # 400 unknown unit, 3 Refill(s), Type: Maintenance, Pharmacy: [id number] [facility] [place], TEST 4 TIMES A DAY  CONTOUR NEXT TEST STRIPS: CONTOUR NEXT TEST STRIPS, See Instructions, Instructions: pt testing 4 times daily- pt has a pump and needing to test more often due to fluctuating BS's - Dx - Z96.41 and E10.9, Supply, # 400 EA, 3 Refill(s), Type: Maintenance, Pharmacy: [facility],...  Dexcom G6 : Dexcom G6 , See Instructions, Instructions: use to see glucose readings, Supply, # 1 EA, 1 Refill(s), Type: Maintenance, Pharmacy: [facility], use to see glucose readings, 68.5, in, 07/09/20 10:12:00 CDT, Height Measured, 186.6, lb, 07/09/20...  Dexcom G6 sensors: Dexcom G6 sensors, See Instructions, Instructions: change sensor every 10 days  E10.9, Supply, # 9 EA, 11 Refill(s), Type: Maintenance, Pharmacy: [facility], change sensor every 10 days  E10.9, 67, in, 12/01/20 8:34:00 CST, Height Measured, 191.4, l...  Dexcom G6 transmitter: Dexcom G6 transmitter, See Instructions, Instructions: change every 3 months, Supply, # 1 EA, 3 Refill(s), Type: Maintenance, Pharmacy: [facility], change every 3 months, 68.5, in, 07/09/20 10:12:00 CDT, Height Measured, 186.6, lb, 07/09/20 10:12:00...  Glucagon Emergency Kit for Low Blood Sugar 1 mg injection: See Instructions, Instructions: 1mg injection if pt unable to safely consume glucose, # 1 EA, 0 Refill(s), Type: Maintenance, Pharmacy: [id number] [facility], keep on hold, 1mg injection if pt unable to safely consume glucose  Glucose tabs: Glucose tabs, See Instructions, Instructions: use as needed for hypoglycemia, # 100 EA, 3 Refill(s), Type:  Maintenance, Pharmacy: [facility]  NovoLOG 100 units/mL injectable solution: See Instructions, Instructions: INJECT UP TO 70 UNITS DAILY VIA PUMP-  Dx E10.9, # 90 mL, 3 Refill(s), Type: Maintenance, Pharmacy: [facility], INJECT UP TO 70 UNITS DAILY VIA PUMP-  Dx E10.9, 67, in, 12/01/20 8:34:00 CST, Height Measured, 191.4, lb...  ULTRA-FINE III SHORT PEN NEEDL MG INJECTABLE: ULTRA-FINE III SHORT PEN NEEDL MG INJECTABLE, See Instructions, Instructions: injects 4 times daily, # 400 EA, 11 Refill(s), Pharmacy: [id number]  aspirin 81 mg oral tablet: 1 tab(s) ( 81 mg ), PO, Daily, # 30 tab(s), 0 Refill(s), Type: Maintenance, OTC (Rx)  levothyroxine 175 mcg (0.175 mg) oral tablet: = 1 tab(s) ( 175 mcg ), Oral, daily, # 90 tab(s), 3 Refill(s), Type: Maintenance, Pharmacy: [facility], 1 tab(s) Oral daily, 67, in, 12/01/20 8:34:00 CST, Height Measured, 191.4, lb, 12/01/20 8:34:00 CST, Weight Measured  pravastatin 20 mg oral tablet: See Instructions, Instructions: TAKE 1 TABLET BY MOUTH ONCE DAILY AT BEDTIME, # 90 unknown unit, 3 Refill(s), Pharmacy: [facility], TAKE 1 TABLET BY MOUTH ONCE DAILY AT BEDTIME, 67, in, 12/01/20 8:34:00 CST, Height Measured, 191.4, lb, 12/01/20 8:34...  Documented Medications  Documented  Metamucil: Oral, 0 Refill(s), Type: Maintenance,    Medications          *denotes recorded medication          ULTRA-FINE III SHORT PEN NEEDL MG INJECTABLE: See Instructions, injects 4 times daily, 400 EA.          CONTOUR NEXT TEST STRIP: See Instructions, TEST 4 TIMES A DAY, 400 unknown unit, 3 Refill(s).          CONTOUR NEXT TEST STRIPS: See Instructions, pt testing 4 times daily- pt has a pump and needing to test more often due to fluctuating BS's - Dx - Z96.41 and E10.9, 400 EA, 3 Refill(s).          Glucose tabs: See Instructions, use as needed for hypoglycemia, 100 EA.          Dexcom G6 transmitter: See Instructions, change every 3 months, 1 EA, 3 Refill(s).          Dexcom G6  : See Instructions, use to see glucose readings, 1 EA, 1 Refill(s).          Dexcom G6 sensors: See Instructions, change sensor every 10 days  E10.9, 9 EA, 11 Refill(s).          aspirin 81 mg oral tablet: 81 mg, 1 tab(s), PO, Daily, 30 tab(s).          Glucagon Emergency Kit for Low Blood Sugar 1 mg injection: See Instructions, 1mg injection if pt unable to safely consume glucose, 1 EA, 0 Refill(s).          NovoLOG 100 units/mL injectable solution: See Instructions, INJECT UP TO 70 UNITS DAILY VIA PUMP-  Dx E10.9, 90 mL, 3 Refill(s).          levothyroxine 175 mcg (0.175 mg) oral tablet: 175 mcg, 1 tab(s), Oral, daily, 90 tab(s), 3 Refill(s).          pravastatin 20 mg oral tablet: See Instructions, TAKE 1 TABLET BY MOUTH ONCE DAILY AT BEDTIME, 90 unknown unit, 3 Refill(s).          *Metamucil: Oral, 0 Refill(s).       Problem list:    All Problems  Intolerance of drug / SNOMED CT 92397139 / Confirmed  Insulin pump in place / SNOMED CT 6180525395 / Confirmed  Hypothyroid / SNOMED CT 49139645 / Confirmed  Hyperlipidemia / SNOMED CT 24610118 / Confirmed  Essential tremor / SNOMED CT 3286703861 / Confirmed  Diabetes mellitus type 1 / SNOMED CT 364224972 / Confirmed  Canceled: Long term (current) use of insulin / SNOMED CT 759922212      Histories   Past Medical History:    Active  Hyperlipidemia (80041857)  Hypothyroid (74333645)  Comments:  2010 CST 1:40 PM Kassi Gonzalez CMA  2007  Diabetes mellitus type 1 (265831272)  Comments:  2010 CST 1:41 PM Kassi Gonzalez CMA  2007  Essential tremor (1155566809)   Family History:    Diabetes mellitus type I  Father ()  Bowel obstruction  Father ()  [place] - Cancer of uterus  Mother ()     Procedure history:    Colonoscopy (SNOMED CT 294657243) performed by Mikhail Dawson MD on 2019 at 74 Years.  Comments:  2020 8:52 AM CDT - Marianna Villanueva  Indication: Positive Cologuard.             Sedation: MAC.    2019  "9:23 AM CDT - Mahnaz Malika MAHER  Tubular adenoma - negative for high grade dysplasia - Repeat in 3years  Screening for colon cancer (SNOMED CT 0104580765) performed by Kb Mcqueen MD on 5/20/2019 at 74 Years.  Comments:  6/3/2019 4:46 PM CDT - Yoanna Danielson MA result:  positive  Recommendation:  needs colonoscopy  Screening for malignant neoplasm of colon (SNOMED CT 6837003478) on 12/7/2015 at 70 Years.  Comments:  1/30/2016 8:15 AM CST - Adali Pantoja is negative  Sialogram (SNOMED CT 563796951) on 2/1/2006 at 60 Years.  Comments:  3/29/2013 10:58 AM CDT - Yoanna Porras  \"Stone.  Swollen gland.\"  removal of stone, left mandibular gland in the month of 12/2005 at 60 Years.  Sigmoidoscopy (SNOMED CT 38661159) performed by Sky Biu MD on 10/17/2002 at 57 Years.  Insulin pump, device (SNOMED CT 7641447099).   Social History:        Electronic Cigarette/Vaping Assessment            Electronic Cigarette Use: Never.      Alcohol Assessment            1-2 times per month, 1 drinks/episode average.      Tobacco Assessment            Former smoker, quit more than 30 days ago, Cigarettes      Employment and Education Assessment            Retired, Work/School description: /, [employer].  Previous               employment/school: [employer].      Home and Environment Assessment            Marital status: .  Spouse/Partner name: Vicenta.      Exercise and Physical Activity Assessment            Exercise frequency: Daily.  Exercise type: Aerobics, Walking, Weight lifting.        Physical Examination   Measurements from flowsheet : Measurements   4/29/2021 1:54 PM CDT Height Measured - Standard 67 in    Height/Length Estimated 73 cm    Weight Measured - Standard 187.6 lb    BSA 2 m2    Body Mass Index 29.38 kg/m2  HI         Review / Management   Results review:  Lab results   4/23/2021 12:21 PM CDT Sodium Level 137 mmol/L    " Potassium Level 4.2 mmol/L    Chloride Level 102 mmol/L    CO2 Level 29 mmol/L    Glucose Level 159 mg/dL  HI    BUN 18 mg/dL    Creatinine 0.94 mg/dL    BUN/Creat Ratio NOT APPLICABLE    eGFR 79 mL/min/1.73m2    eGFR African American 92 mL/min/1.73m2    Calcium Level 9.4 mg/dL    Hgb A1c 8.5  HI    TSH 1.21 mIU/L    U Creatinine 97 mg/dL    U Microalbumin 0.6 mg/dL    Ur Microalb/Creat Ratio 6    WBC 9.4    RBC 4.60    Hgb 15.4 gm/dL    Hct 44.8 %    MCV 97.4 fL    MCH 33.5 pg  HI    MCHC 34.4 gm/dL    RDW 13.7 %    Platelet 236    MPV 10.2 fL   .       Impression and Plan   Diagnosis     Diabetes mellitus type 1 (UGQ35-SX E10.9).     Hyperlipidemia NOS (FUH56-SZ E11.69).     Overweight (BMI 25.0-29.9) (GGW69-HB E66.3).       Counseled: Patient, Further instruction on AADE7 Self Care Behaviors  Discussed living well with diabetes, further information about diabetes disease process, reviewed chronic condition and appropriate treatment and self management  Healthy Eating - Large focus on accuracy of carbohydrate counting and decreasing carb load at meals/ dispersing them throughout the day - pt admits he likes to eat large portions.  Reviewed one way of trying to reduce glycemic variability through following a carbohydrate controlled meal plan - pt not on board with this yet.  Pt given meal planning ideas to incorporate dietary recommendations,  Education review on decreasing cardiovascular risk with following Therapeutic Lifestyle Changes (TLC) nutrition plan for heart healthy eating and healthy eating through the stay at home recommendations  Being Active - Education on how exercise helps with : will resume as able    - lowering BG by increasing the muscles ability to take up and use glucose   - weight loss   - healthier heart (improve lipid profile)   - improve sleep, mood, energy   - decrease stress      Monitoring - Continue with Dexcom G6 and use BG testing as needed  Patient continues to meet criteria for CGM  coverage;   - patient has type one diabetes mellitus; and  - patient has been using CGM daily; and  - patient is insulin treated with a continues infusion of insulin via pump; and  - patient is frequently adjusting insulin on the basis of CGM readings  - within six months prior to ordering the CGM, the patient has had an in-person visit with the practitioner; and determined that criteria (1-4) above are met; and   - every six months following the initial prescription of the CGM, the treating practitioner has an in-person visit with the patient to assess adherence to their CGM regimen and diabetes treatment plan    Taking Medication - on NovoLog   Problem Solving -  Pump settings adjusted based on sensor download  Reviewed Pump settings (basal/bolus), menu options and use, bolus wizard suspend, temp basal BG testing recommendations, hypo/hyperglycemia guidelines, infusion set changes and fill and DKA prevention.  Wear sensor and change every 10 days   medical support team assistance, resources provided (written); good control of stress, pump adjustments made today slightly but elevated reading and low readings likely related to user recommended routine changes.  Bolus early 20+ min prior to meals, test BG prior to ALL meals, bolus for ALL snacks >5gm CHO, use temp basal for exercise  Healthy Coping - support of friends, family, and medical support team   Reducing Risks - Detailed education on potential complications associated with uncontrolled diabetes and prevention recommendations.  Recommendations include: annual eye exam, good oral cares with brushing BID and flossing daily, routine dental apts. good foot care tips and shoe wear - discussed monofilament test yearly.  Importance of adequate sleep and good control of BG to prevent developing complications related to uncontrolled DM including nephropathy, neuropathy, retinopathy, and cardiovascular disease.  Weight loss goal of 7 % of current body weight pounds as  a reasonable goal to help increase insulin sensitivity      Pt able to verbalize understanding of above education, handouts provided for additional resources.       Goals:   1.  Practice healthy stress management and get good quality sleep with the goal of 7-8 hours per night.    2.   Physical activity: Recommend 30 min of physical activity on 5 or more days/ week.    3.  Eat in a healthy way, per diabetic plate method.  Nutrition reference: Eat 3 meals a day; snacks are optional.  A meal is three or more food groups; make it colorful for better nutrition.    4. Count carbohydrates and use bolus wizard for all meals and snacks bolus 15-20min prior to eating   5.  Use sensor and if not available BG testing 4+x/ day  Goal pre-meals 75 - 120; 2 hrs after meals 75 - 140   6.  Be aware of s/sx of hypo/ hyperglycemia and follow treatment protocol   7.  Always have back up pump supplies on hand and vial/ syringe with battery and glucose tablets   8. Follow up in 2 mo   9.  A1c <7%  Time in range 80 - 180 >70%      Professional Services   Time spent with pt 60 min   cc Dr. Kb Mcqueen

## 2022-02-15 NOTE — NURSING NOTE
Comprehensive Intake Entered On:  11/25/2019 10:30 AM CST    Performed On:  11/25/2019 10:21 AM CST by Charles Harris CMA               Summary   Chief Complaint :   Pt here for productive cough and runny nose x week.     Weight Measured :   182 lb(Converted to: 182 lb 0 oz, 82.55 kg)    Height Measured :   68.5 in(Converted to: 5 ft 8 in, 173.99 cm)    Body Mass Index :   27.27 kg/m2 (HI)    Body Surface Area :   2 m2   Systolic Blood Pressure :   126 mmHg   Diastolic Blood Pressure :   64 mmHg   Mean Arterial Pressure :   85 mmHg   Peripheral Pulse Rate :   88 bpm   BP Site :   Right arm   Pulse Site :   Radial artery   Temperature Tympanic :   98 DegF(Converted to: 36.7 DegC)    Respiratory Rate :   16 br/min   Oxygen Saturation :   98 %   Charles Harris CMA - 11/25/2019 10:21 AM CST   Health Status   Allergies Verified? :   Yes   Medication History Verified? :   Yes   Medical History Verified? :   Yes   Pre-Visit Planning Status :   Not completed   Tobacco Use? :   Former smoker   Charles Harris CMA - 11/25/2019 10:21 AM CST   Meds / Allergies   (As Of: 11/25/2019 10:30:56 AM CST)   Allergies (Active)   atorvastatin  Estimated Onset Date:   Unspecified ; Reactions:   joint pain ; Created By:   Mary Leung CMA; Reaction Status:   Active ; Category:   Drug ; Substance:   atorvastatin ; Type:   Intolerance ; Updated By:   Mary Leung CMA; Reviewed Date:   11/25/2019 10:30 AM CST        Medication List   (As Of: 11/25/2019 10:30:56 AM CST)   Prescription/Discharge Order    levothyroxine  :   levothyroxine ; Status:   Prescribed ; Ordered As Mnemonic:   levothyroxine 200 mcg (0.2 mg) oral tablet ; Simple Display Line:   200 mcg, 1 tab(s), po, daily, 90 tab(s), 1 Refill(s) ; Ordering Provider:   Kb Mcqueen MD; Catalog Code:   levothyroxine ; Order Dt/Tm:   11/22/2019 9:51:54 AM CST          insulin aspart  :   insulin aspart ; Status:   Prescribed ; Ordered As Mnemonic:   NovoLOG 100 units/mL injectable solution ;  Simple Display Line:   See Instructions, INJECT UP TO 70 UNITS DAILY VIA PUMP-  Dx E10.9, 90 mL, 3 Refill(s) ; Ordering Provider:   Kb Mcqueen MD; Catalog Code:   insulin aspart ; Order Dt/Tm:   10/4/2019 3:51:25 PM CDT          Miscellaneous Prescription  :   Miscellaneous Prescription ; Status:   Prescribed ; Ordered As Mnemonic:   CONTOUR NEXT TEST STRIPS ; Simple Display Line:   See Instructions, pt testing 4 times daily- pt has a pump and needing to test more often due to fluctuating BS's - Dx - Z96.41 and E10.9, 400 EA, 3 Refill(s) ; Ordering Provider:   Kb Mcqueen MD; Catalog Code:   Miscellaneous Prescription ; Order Dt/Tm:   9/26/2019 11:48:12 AM CDT          Miscellaneous Prescription  :   Miscellaneous Prescription ; Status:   Prescribed ; Ordered As Mnemonic:   CONTOUR NEXT TEST STRIP ; Simple Display Line:   See Instructions, TEST 4 TIMES A DAY, 400 unknown unit, 3 Refill(s) ; Ordering Provider:   Kb Mcqueen MD; Catalog Code:   Miscellaneous Prescription ; Order Dt/Tm:   9/17/2019 11:41:58 AM CDT          pravastatin  :   pravastatin ; Status:   Prescribed ; Ordered As Mnemonic:   pravastatin 20 mg oral tablet ; Simple Display Line:   20 mg, 1 tab(s), PO, hs, 90 tab(s), 3 Refill(s) ; Ordering Provider:   Kb Mcqueen MD; Catalog Code:   pravastatin ; Order Dt/Tm:   5/3/2019 10:50:33 AM CDT          glucagon  :   glucagon ; Status:   Prescribed ; Ordered As Mnemonic:   Glucagon Emergency Kit for Low Blood Sugar 1 mg injection ; Simple Display Line:   See Instructions, 1mg injection if pt unable to safely consume glucose, 1 EA, 0 Refill(s) ; Ordering Provider:   Kb Mcqueen MD; Catalog Code:   glucagon ; Order Dt/Tm:   5/3/2019 10:50:31 AM CDT          Miscellaneous Prescription  :   Miscellaneous Prescription ; Status:   Prescribed ; Ordered As Mnemonic:   ULTRA-FINE III SHORT PEN NEEDL MG INJECTABLE ; Simple Display Line:   See Instructions, injects 4 times daily, 400 EA ; Ordering Provider:   Rodo  Zachery RIZZO; Catalog Code:   Miscellaneous Prescription ; Order Dt/Tm:   6/5/2012 4:48:13 PM CDT          aspirin  :   aspirin ; Status:   Prescribed ; Ordered As Mnemonic:   aspirin 81 mg oral tablet ; Simple Display Line:   81 mg, 1 tab(s), PO, Daily, 30 tab(s) ; Ordering Provider:   Zachery Koehler MD; Catalog Code:   aspirin ; Order Dt/Tm:   3/2/2012 11:59:54 AM CST          Miscellaneous Rx Supply  :   Miscellaneous Rx Supply ; Status:   Prescribed ; Ordered As Mnemonic:   Glucose tabs ; Simple Display Line:   See Instructions, use as needed for hypoglycemia, 100 EA ; Ordering Provider:   Zachery Koheler MD; Catalog Code:   Miscellaneous Rx Supply ; Order Dt/Tm:   3/29/2011 9:39:13 AM CDT            Past Medical History   Past Medical History   (As Of: 11/25/2019 10:30:56 AM CST)   Hyperlipidemia  Name of Problem:   Hyperlipidemia ; Recorder:   Kassi Link CMA; Confirmation:   Confirmed ; Classification:   Medical ; Code:   [id number] ; Contributor System:   [employer] ; Last Updated:   11/16/2015 3:39 PM CST ; Life Cycle Date:   1/27/2010 ; Life Cycle Status:   Active ; Vocabulary:   SNOMED CT          Hypothyroid  Name of Problem:   Hypothyroid ; Recorder:   Kassi Link CMA; Confirmation:   Confirmed ; Classification:   Medical ; Code:   [id number] ; Contributor System:   [employer] ; Last Updated:   5/16/2018 7:41 AM CDT ; Life Cycle Status:   Active ; Vocabulary:   SNOMED CT ; Comments:      1/27/2010 1:40 PM - Kassi Link CMA  2007          Diabetes mellitus type 1  Name of Problem:   Diabetes mellitus type 1 ; Recorder:   Kassi Lnik CMA; Confirmation:   Confirmed ; Classification:   Medical ; Code:   [id number] ; Contributor System:   [employer] ; Last Updated:   5/16/2018 7:41 AM CDT ; Life Cycle Status:   Active ; Vocabulary:   SNOMED CT ; Comments:      1/27/2010 1:41 PM - Kassi Link CMA  2007          Essential tremor  Name of Problem:   Essential tremor ; Recorder:   Rodo RIZZO  "Zachery; Confirmation:   Confirmed ; Classification:   Medical ; Code:   [id number] ; Contributor System:   Power[employer] ; Last Updated:   5/16/2018 7:41 AM CDT ; Life Cycle Status:   Active ; Responsible Provider:   Zachery Koehler MD; Vocabulary:   SNOMED CT            Procedures / Surgeries        -    Procedure History   (As Of: 11/25/2019 10:30:56 AM CST)     Procedure Dt/Tm:   12/2005 ; Anesthesia Minutes:   0 ; Procedure Name:   removal of stone, left mandibular gland ; Procedure Minutes:   0            Procedure Dt/Tm:   2/1/2006 ; Anesthesia Minutes:   0 ; Procedure Name:   Sialogram ; Procedure Minutes:   0 ; Comments:     3/29/2013 10:58 AM CDT - Yoanna Porras  \"Stone.  Swollen gland.\"            Procedure Dt/Tm:   10/17/2002 ; Provider:   Sky Bui MD; Anesthesia Minutes:   0 ; Procedure Name:   Sigmoidoscopy ; Procedure Minutes:   0            Procedure Dt/Tm:   12/7/2015 ; Anesthesia Minutes:   0 ; Procedure Name:   Screening for malignant neoplasm of colon ; Procedure Minutes:   0 ; Comments:     1/30/2016 8:15 AM CST - Adali Pantoja is negative            Procedure Dt/Tm:   5/20/2019 ; Provider:   Kb Mcqueen MD; Anesthesia Minutes:   0 ; Procedure Name:   Screening for colon cancer ; Procedure Minutes:   0 ; Comments:     6/3/2019 4:46 PM CDT - Yoanna Danielson MA result:  positive  Recommendation:  needs colonoscopy            Procedure Dt/Tm:   6/27/2019 ; Anesthesia Minutes:   0 ; Procedure Name:   Colonoscopy ; Procedure Minutes:   0 ; Comments:     9/20/2019 9:23 AM CDT - Malika Joya CMA  Tubular adenoma - negative for high grade dysplasia - Repeat in 3years            Anesthesia Minutes:   0 ; Procedure Name:   Insulin pump, device ; Procedure Minutes:   0 ; Last Reviewed Dt/Tm:   11/25/2019 10:30:50 AM CST            Social History   Social History   (As Of: 11/25/2019 10:30:56 AM CST)   Alcohol:        1-2 times per month, 1 drinks/episode average.   (Last " Updated: 11/25/2014 9:46:31 AM CST by Malika Joya CMA)          Tobacco:        Former smoker, quit more than 30 days ago, Cigarettes   (Last Updated: 9/20/2018 12:33:59 PM CDT by Malika Joya CMA)          Employment/School:        Retired, Work/School description: /, [employer].  Previous employment/school: [employer].   (Last Updated: 3/29/2013 11:08:49 AM CDT by Yoanna Porras)          Home/Environment:        Marital status: .  Spouse/Partner name: Vicenta.   (Last Updated: 3/29/2013 11:02:41 AM CDT by Yoanna Porras)          Exercise:        Exercise frequency: Daily.  Exercise type: Aerobics, Walking, Weight lifting.   (Last Updated: 12/20/2016 1:13:09 PM CST by Sonia Cleary)

## 2022-02-15 NOTE — LETTER
(Inserted Image. Unable to display)   November 16, 2021  [place] [address]        Dear ROSALIO,    Thank you for selecting [facility] for your healthcare needs.    Your Healthcare Provider has recommended that you schedule a diabetes management appointment with our Certified Diabetes Educator, Rupal White RD, CD, CDE.     Please bring your glucose meter and your blood glucose diary to your appointment.    Available services include:  - Education about diabetes with guidance and support   - Education on the 7 Self Care Behaviors for Diabetes Management:     Healthy Eating   portion control, label readings, carbohydrate recommendations, heart healthy guidelines, meal planning    Being Active - Physical activity guidelines     Monitoring   blood glucose targets, evaluation of blood glucose readings and lab results    Taking Medication   medication management    Problem Solving   discuss any concerns/ questions     Healthy Coping   disease progression and management    Reducing Risks   prevention strategies to help avoid potential complications related to uncontrolled diabetes  - Weight loss strategies      (FYI   Regarding office visit: In some instances, a video visit may be offered as an option.)    To schedule an appointment or if you have further questions, please contact your clinic at [phone].    Powered by [employer]    Sincerely,    Rupal White RD, CD, CDE

## 2022-02-15 NOTE — PROGRESS NOTES
Patient:   ROSALIO ANDRADE            MRN: [id number]            FIN: [id number]               Age:   76 years     Sex:  Male     :  1945   Associated Diagnoses:   Hypothyroid; Hyperlipidemia; Diabetes mellitus type 1; Tremor, Essential   Author:   bK Mcqueen MD      Visit Information      Date of Service: 2021 11:29 am  Performing Location: [facility]  Encounter#: [id number]      Primary Care Provider (PCP):  Kb Mcqueen MD    NPI# [id number]      Referring Provider:  Kb Mcqueen MD    NPI# [id number]      Chief Complaint   2021 11:35 AM CDT   Pt here for a Preop Px for Left Shoulder Rotator Cuff repair by Dr Paul at [facility] DOS 5/3/21.              Additional Information:No additional information recorded during visit.   Chief complaint and symptoms as noted above and confirmed with patient      History of Present Illness   4/10/2014: Rosalio presents to clinic to [facility] care, previously followed by [facility] approximately the last 10 yrs. he s been diagnosed with adult onset type 1 diabetes, insulin managed with a combination of basal bolus insulin therapy.  Historically his glycemic control has not been optimal. Is very knowledgeable about carbohydrate counting and insulin to carbohydrate ratios.  He says that his main nemesis is evening snacking.  He will typically bolus with NovoLog when eating popcorn, otherwise does not bolus with snacking.  He has no known diabetic complications.  He remains very active with daily exercise, primarily swimming.  He swam back in college.  He resides in [place] for a portion of the year.  He s been resistant to starting on cholesterol medicines in the past.  Also with complaints of left sided elbow/forearm pains - not improved with forearm brace.  He is a retired orchestral director and plays base cello - arm pains limiting his abilities.    2020:  Rosalio returns to clinic for follow-up.  He was started on a continuous glucose monitor in  September of this year.  Overall has found it helpful especially for eliminating his evening lows.  Still challenging to maintain daytime euglycemia though denies having any further hypoglycemia.  Planning on leaving for [place] in early January.  Has missed being more physically active all appear.  Feeling well otherwise.  No identified Covid exposures.  4/23/2021: Irwin presents for preoperative assessment for planned left rotator cuff tear repair scheduled for the beginning of May.  He says he had a fall event in [place] in April falling on outstretched arm.  Worked with physical therapy without symptom improvement.  No other specific health related issues or concerns.  He is received his first Covid series vaccination without issues, second dose scheduled for this coming week.         Review of Systems   Constitutional:  No fever, No chills.    Eye:  Negative except as documented in history of present illness.    Ear/Nose/Mouth/Throat:  Negative except as documented in history of present illness.    Respiratory:  No shortness of breath.    Cardiovascular:  No chest pain, No palpitations, No peripheral edema, No syncope.    Gastrointestinal:  No nausea, No vomiting, No abdominal pain.    Genitourinary:  ED, No dysuria, No hematuria.    Hematology/Lymphatics:  Negative except as documented in history of present illness.    Endocrine:  No excessive thirst, No polyuria.    Immunologic:  No recurrent fevers.    Musculoskeletal:  Joint pain, Decreased range of motion, Trauma, No muscle pain.    Neurologic:  Alert and oriented X4, tremor, No numbness, No tingling, No headache.       Health Status   Allergies:    Allergic Reactions (Selected)  No known allergies   Medications:  (Selected)   Prescriptions  Prescribed  CONTOUR NEXT TEST STRIP: See Instructions, Instructions: TEST 4 TIMES A DAY, # 400 unknown unit, 3 Refill(s), Type: Maintenance, Pharmacy: [id number] [place], TEST 4 TIMES A DAY  CONTOUR NEXT TEST  STRIPS: CONTOUR NEXT TEST STRIPS, See Instructions, Instructions: pt testing 4 times daily- pt has a pump and needing to test more often due to fluctuating BS's - Dx - Z96.41 and E10.9, Supply, # 400 EA, 3 Refill(s), Type: Maintenance, Pharmacy: [facility],...  Dexcom G6 : Dexcom G6 , See Instructions, Instructions: use to see glucose readings, Supply, # 1 EA, 1 Refill(s), Type: Maintenance, Pharmacy: [facility], use to see glucose readings, 68.5, in, 07/09/20 10:12:00 CDT, Height Measured, 186.6, lb, 07/09/20...  Dexcom G6 sensors: Dexcom G6 sensors, See Instructions, Instructions: change sensor every 10 days  E10.9, Supply, # 9 EA, 11 Refill(s), Type: Maintenance, Pharmacy: [facility], change sensor every 10 days  E10.9, 67, in, 12/01/20 8:34:00 CST, Height Measured, 191.4, l...  Dexcom G6 transmitter: Dexcom G6 transmitter, See Instructions, Instructions: change every 3 months, Supply, # 1 EA, 3 Refill(s), Type: Maintenance, Pharmacy: [facility], change every 3 months, 68.5, in, 07/09/20 10:12:00 CDT, Height Measured, 186.6, lb, 07/09/20 10:12:00...  Glucagon Emergency Kit for Low Blood Sugar 1 mg injection: See Instructions, Instructions: 1mg injection if pt unable to safely consume glucose, # 1 EA, 0 Refill(s), Type: Maintenance, Pharmacy: [facility] [id number] [facility] [place], keep on hold, 1mg injection if pt unable to safely consume glucose  Glucose tabs: Glucose tabs, See Instructions, Instructions: use as needed for hypoglycemia, # 100 EA, 3 Refill(s), Type: Maintenance, Pharmacy: [facility]  NovoLOG 100 units/mL injectable solution: See Instructions, Instructions: INJECT UP TO 70 UNITS DAILY VIA PUMP-  Dx E10.9, # 90 mL, 3 Refill(s), Type: Maintenance, Pharmacy: [facility], INJECT UP TO 70 UNITS DAILY VIA PUMP-  Dx E10.9, 67, in, 12/01/20 8:34:00 CST, Height Measured, 191.4, lb...  ULTRA-FINE III SHORT PEN NEEDL MG INJECTABLE: ULTRA-FINE III SHORT PEN NEEDL MG INJECTABLE, See Instructions,  Instructions: injects 4 times daily, # 400 EA, 11 Refill(s), Pharm[id number]  aspirin 81 mg oral tablet: 1 tab(s) ( 81 mg ), PO, Daily, # 30 tab(s), 0 Refill(s), Type: Maintenance, OTC (Rx)  levothyroxine 175 mcg (0.175 mg) oral tablet: = 1 tab(s) ( 175 mcg ), Oral, daily, # 90 tab(s), 3 Refill(s), Type: Maintenance, Pharmacy: [facility], 1 tab(s) Oral daily, 67, in, 12/01/20 8:34:00 CST, Height Measured, 191.4, lb, 12/01/20 8:34:00 CST, Weight Measured  pravastatin 20 mg oral tablet: See Instructions, Instructions: TAKE 1 TABLET BY MOUTH ONCE DAILY AT BEDTIME, # 90 unknown unit, 3 Refill(s), Pharmacy: [facility], TAKE 1 TABLET BY MOUTH ONCE DAILY AT BEDTIME, 67, in, 12/01/20 8:34:00 CST, Height Measured, 191.4, lb, 12/01/20 8:34...  Documented Medications  Documented  Metamucil: Oral, 0 Refill(s), Type: Maintenance,    Medications          *denotes recorded medication          ULTRA-FINE III SHORT PEN NEEDL MG INJECTABLE: See Instructions, injects 4 times daily, 400 EA.          CONTOUR NEXT TEST STRIP: See Instructions, TEST 4 TIMES A DAY, 400 unknown unit, 3 Refill(s).          CONTOUR NEXT TEST STRIPS: See Instructions, pt testing 4 times daily- pt has a pump and needing to test more often due to fluctuating BS's - Dx - Z96.41 and E10.9, 400 EA, 3 Refill(s).          Glucose tabs: See Instructions, use as needed for hypoglycemia, 100 EA.          Dexcom G6 transmitter: See Instructions, change every 3 months, 1 EA, 3 Refill(s).          Dexcom G6 : See Instructions, use to see glucose readings, 1 EA, 1 Refill(s).          Dexcom G6 sensors: See Instructions, change sensor every 10 days  E10.9, 9 EA, 11 Refill(s).          aspirin 81 mg oral tablet: 81 mg, 1 tab(s), PO, Daily, 30 tab(s).          Glucagon Emergency Kit for Low Blood Sugar 1 mg injection: See Instructions, 1mg injection if pt unable to safely consume glucose, 1 EA, 0 Refill(s).          NovoLOG 100 units/mL  injectable solution: See Instructions, INJECT UP TO 70 UNITS DAILY VIA PUMP-  Dx E10.9, 90 mL, 3 Refill(s).          levothyroxine 175 mcg (0.175 mg) oral tablet: 175 mcg, 1 tab(s), Oral, daily, 90 tab(s), 3 Refill(s).          pravastatin 20 mg oral tablet: See Instructions, TAKE 1 TABLET BY MOUTH ONCE DAILY AT BEDTIME, 90 unknown unit, 3 Refill(s).          *Metamucil: Oral, 0 Refill(s).       Problem list:    All Problems  Intolerance of drug / SNOMED CT 36553465 / Confirmed  Essential tremor / SNOMED CT 8136004825 / Confirmed  Hyperlipidemia / SNOMED CT 53715791 / Confirmed  Hypothyroid / SNOMED CT 77716277 / Confirmed  Insulin pump in place / SNOMED CT 3007708294 / Confirmed  Diabetes mellitus type 1 / SNOMED CT 062579832 / Confirmed  Canceled: Long term (current) use of insulin / SNOMED CT 175677627      Histories   Past Medical History:    Active  Hyperlipidemia (99801477)  Hypothyroid (48223698)  Comments:  2010 CST 1:40 PM Kassi Gonzalez CMA  2007  Diabetes mellitus type 1 (345429903)  Comments:  2010 CST 1:41 PM Kassi Gonzalez CMA  2007  Essential tremor ([id number])   Family History:    Diabetes mellitus type I  Father ()  Bowel obstruction  Father ()  [place] - Cancer of uterus  Mother ()     Procedure history:    Colonoscopy (771170563) on 2019 at 74 Years.  Comments:  2020 8:52 AM CDT - Marianna Villanueva  Indication: Positive Cologuard.             Sedation: MAC.    2019 9:23 AM CDT - Malika Joya CMA  Tubular adenoma - negative for high grade dysplasia - Repeat in 3years  Screening for colon cancer (4893640986) on 2019 at 74 Years.  Comments:  6/3/2019 4:46 PM CDT - Yoanna Danielson MA  Cologuaashley result:  positive  Recommendation:  needs colonoscopy  Screening for malignant neoplasm of colon (5702219436) on 2015 at 70 Years.  Comments:  2016 8:15 AM CST - Adali Pantoja is negative  Sialogram (320333680) on 2006 at 60  "Years.  Comments:  3/29/2013 10:58 AM CDT - Yoanna Porras  \"Stone.  Swollen gland.\"  removal of stone, left mandibular gland in the month of 12/2005 at 60 Years.  Sigmoidoscopy (07460502) on 10/17/2002 at 57 Years.  Insulin pump, device ([id number]).   Social History:        Electronic Cigarette/Vaping Assessment            Electronic Cigarette Use: Never.      Alcohol Assessment            1-2 times per month, 1 drinks/episode average.      Tobacco Assessment            Former smoker, quit more than 30 days ago, Cigarettes      Employment and Education Assessment            Retired, Work/School description: /, [employer].  Previous               employment/school: [employer].      Home and Environment Assessment            Marital status: .  Spouse/Partner name: Vicenta.      Exercise and Physical Activity Assessment            Exercise frequency: Daily.  Exercise type: Aerobics, Walking, Weight lifting.        Physical Examination   vital signs stable, as noted above   Vital Signs   4/23/2021 11:35 AM CDT Temperature Tympanic 97.7 DegF  LOW    Peripheral Pulse Rate 72 bpm    Pulse Site Radial artery    Respiratory Rate 16 br/min    Systolic Blood Pressure 147 mmHg  HI    Diastolic Blood Pressure 81 mmHg  HI    Mean Arterial Pressure 103 mmHg    BP Site Right arm    BP Method Electronic    Vital Signs Comments Pulse Irregular      Measurements from flowsheet : Measurements   4/23/2021 11:35 AM CDT Height Measured - Standard 67 in    Height/Length Estimated 73 cm    Weight Measured - Standard 189 lb    BSA 2.01 m2    Body Mass Index 29.6 kg/m2  HI      General:  Alert and oriented, No acute distress.    Eye:  Extraocular movements are intact.    HENT:  Normocephalic, Tympanic membranes are clear, Oral mucosa is moist.    Neck:  Supple, Non-tender, No lymphadenopathy.    Respiratory:  Lungs are clear to auscultation, Respirations are non-labored.  "   Cardiovascular:  Normal rate, Regular rhythm, No murmur, No edema.    Gastrointestinal:  Soft, Non-tender, Non-distended, Wearing insulin pump.    Musculoskeletal:  Normal range of motion, Normal strength.    Neurologic:  Alert, Oriented, Normal motor function, No focal deficits.    Cognition and Speech:  Oriented, Speech clear and coherent.    Psychiatric:  Appropriate mood & affect.       Review / Management   Results review      Impression and Plan   Diagnosis     Hypothyroid (CMB86-II E03.9).     Hyperlipidemia (DMS92-SD E78.5).     Diabetes mellitus type 1 (IXN39-YP E10.9).     Tremor, Essential (GPO61-KI G25.0).       .) pre-op, planned left shoulder arthroscopic repair of rotator cuff tear, scheduled for 5/3/2021 with Dr. Paul  ECG: NSR, QRS 115ms - nonspecific intraventricular delay; QTc 411ms  - BMP, CBC today  - I need him to clarify with anesthesia about his intra-operative pump and CGMS (sensor) use   - assuming okay with intra-op pump use, I would have him continue current basal rates without any required basal rate changes through surgery.  Recommend intra-operative glycemic monitoring by anesthesia.  If not able to wear pump, then would need Rx for basal insulin pen and will need to take ~ 17 units on night prior to surgery.     - advised to hold his ASA beginning 1 week prior to procedure  - no other necessary medication changes  - planned COVID testing through [facility]   - 2nd COVID vaccination scheduled for 4/28 - I don't anticipate any issues with this scheduling and planned surgery    .) type I DM, uncontrolled, late onset diabetes in adulthood (ROLANDO)  hypoglycemic medications:  insulin therapy:  Medtronic pump (initiated in '17), recently started on Dexcom6 CGM (9/2020)  ICR=   12:00     7.5    Sensitivity=  0:00  40       Target     120 - 120     Basal:  16.05  MN   0.575  3am   0.655    6:30am  0.850    13:00 0.850    22:00  0.550      Insulin action  3.5 hours  Max Bolus    17u  Max Basal    1u/ hr    last HbA1c: 8.2%   microvascular complications: none  macrovascular complications: none  ASA/SIMON/statin:  ASA 81mg daily, pravastatin 20mg qhs    .) hypothyroidism   -  levothyroxine to 175mcg daily    .) ED   - cialis prn    health maintenance   -Cologuard positive(5/2019); colonoscopy (6/2019); multiple adenomatous polyps - repeat study in 3 yrs   - PSA 0.3 (11/2015) - declines regular future screening   - intentional tremor (worse in AM) - suspect essential tremor; likely worsened by caffeine.  Low suspicions for parkinsons   - completed COVID vaccination series #1    RTC in 6 months      Professional Services   Counseling Summary:  This was a 25 minute visit with greater than 50% of that time spent counseling the patient.

## 2022-02-15 NOTE — TELEPHONE ENCOUNTER
Order, demographics, and note brought to [facility] per patients request, they will contact him to schedule.

## 2022-02-15 NOTE — TELEPHONE ENCOUNTER
---------------------  From: Etelvina Vaca   To: Kb Mcqueen MD;     Sent: 6/6/2019 2:12:35 PM CDT  Subject: Hearing Evaluation Results     Dr. Mcqueen,    I had the pleasure of seeing your patient for a hearing evaluation and his detailed results are listed below.    Hx: Patient reports failed hearing screening. He has difficulty with conversation in a variety of listening environments. 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 Denied tinnitus, pain, pressure, drainage, dizziness and family history of hearing loss.    Results: Otoscopy: clear canals bilaterally. Left normal hearing sloping to moderate sensorineural hearing loss (SNHL). Right normal sloping to severe SNHL.  Word Recognition Score: 92% left, 84% right    Rec: Bilateral hearing aid candidate. Annual hearing test or sooner if concerns arise.    Thank you for your referral and please let me know if any questions arise,    Trent Caicedo, CCC-A

## 2022-02-15 NOTE — PROGRESS NOTES
Patient:   ROSALIO ANDRADE            MRN: [id number]            FIN: [id number]               Age:   72 years     Sex:  Male     :  1945   Associated Diagnoses:   Diabetes mellitus type 1; Hyperlipidemia NOS; Overweight (BMI 25.0-29.9)   Author:   Rupal White      Visit Information   Visit type:  Medical Nutrition Therapy.    Referral source:  Richar RIZZO, Kb.       Chief Complaint   Uncontrolled DM Type 1 (ROLANDO), Hyperlipidemia, Overweight       History of Present Illness   Discussed nutrition, diabetes, and lifestyle management with pt.  Pt is here today to download Medtronic 630G insulin pump.  Pt started it on 17.  Pt has had some difficult times with the insertion (wanting to put it where not recommended) resulting in hyperglycemia and requiring frequent pump changes, pt did well with problem solving.  Pt has yet to try swimming.  Encouraged pt swim and just disconnect and reconnect and test q 30 min.    Nutrition:  Pt is counting carbohydrates and using the bolus wizzard as directed.  Pt's carb intake is ~170 +/-38 gm/ day.    Physical Activity:  1 hour daily variety ( walk, weights etc), goes to [place] over the winter months and walks 3 miles daily   Stress:   moderate  Sleep: good  Support: wife  Insulin:  Novolog via pump ~44u/ day   BG Testing: ~ 5x/ day   Routine diabetes care:  up to date with eye, dental, and foot exams - no concerns  Estimated Average Glucose (eAG) 215 mg/dL with A1C of 9.1%     BG avg 171 +/- 93mg/dL   Avg Daily Basal 14.79 (34%)  Avg Daily Bolus 28.83 (66.%)    ICR=   12:00      changed to 8    Sensitivity=  40    Target     100 - 100     Basal: =     15 u/ day changed to 14.625    MN-      0.500  adding in 3:00 am    0.625    Insulin action  3.5 hours  Max Bolus    17u  Max Basal   1u/ hr       Health Status   Allergies:    Nonallergic Reactions (Selected)  Severity Not Documented  Atorvastatin (Joint pain)   Medications:  (Selected)   Prescriptions  Prescribed  B-D PEN NDL  SHRT [id number](5/16) CHRISTINA: B-D PEN NDL SHRT [id number](5/16) CHRISTINA, See Instructions, Instructions: INJECT FOUR TIMES DAILY AS DIRECTED, Supply, # 400 EA, 3 Refill(s), [facility]acy: [id number], keep on hold and pt will notify when needed, INJECT FOUR TIMES DAILY AS DIRECTED...  CONTOUR TEST STRIPS 100'S: CONTOUR TEST STRIPS 100'S, See Instructions, Instructions: TEST FOUR TIMES DAILY AS DIRECTED, Supply, # 400 EA, 3 Refill(s), Pharmacy: [id number], keep on hold and pt will notify when needed, TEST FOUR TIMES DAILY AS DIRECTED  Contour next test strips: Contour next test strips, See Instructions, Instructions: testing 4x/ day - new start for insulin pump, Supply, # 8 bottle(s), 3 Refill(s), Type: Maintenance, Pharmacy: [id number], testing 4x/ day - new start for insulin pump  Contour test strips and lancets: Contour test strips and lancets, See Instructions, Instructions: testing 4 times daily, # 400 EA, 11 Refill(s), Type: Soft Stop, Pharmacy: [id number]  Glucose tabs: Glucose tabs, See Instructions, Instructions: use as needed for hypoglycemia, # 100 EA, 3 Refill(s), Type: Maintenance, Pharmacy: [email or web address]  NovoLOG 100 units/mL subcutaneous solution: See Instructions, Instructions: ~70u/ day use with insulin pump, # 60 mL, 3 Refill(s), Type: Maintenance, Pharmacy: [id number], Pt started Medtronic insulin pump 5/30/17 - insulin should now be covered, ~70u/ day use with insulin pump  ULTRA-FINE III SHORT PEN NEEDL MG INJECTABLE: ULTRA-FINE III SHORT PEN NEEDL MG INJECTABLE, See Instructions, Instructions: injects 4 times daily, # 400 EA, 11 Refill(s), Pharmacy: [id number]  aspirin 81 mg oral tablet: 1 tab(s) ( 81 mg ), PO, Daily, # 30 tab(s), 0 Refill(s), Type: Maintenance, OTC (Rx)  clobetasol 0.05% topical cream: 1 varsha, TOP, BID, Instructions: apply a thin film; apply to affected rash, # 15 g, 1 Refill(s), Type: Maintenance,  "Pharmacy: [id number], 1 varsha top bid,Instr:apply a thin film; apply to affected rash  levothyroxine 200 mcg (0.2 mg) oral tablet: 1 tab(s) ( 200 mcg ), po, daily, # 90 tab(s), 3 Refill(s), Pharmacy: [facility] [id number], keep on hold and pt will notify when needed, 1 tab(s) po daily  pravastatin 20 mg oral tablet: 1 tab(s) ( 20 mg ), PO, hs, # 90 tab(s), 3 Refill(s), Type: Maintenance, Pharmacy: [facility] [id number], keep on file and pt will notify when needed, 1 tab(s) po hs  Documented Medications  Documented  Cinnamon: ( 1,000 mg ), po, daily, 0 Refill(s), Type: Maintenance  Fish Oil: 0  Flax Seed Oil: 0  Metamucil: See Instructions, Instructions: 1 tbsp daily, 0 Refill(s), Type: Maintenance  Multiple Vitamins oral capsule: 1 cap(s), PO, Daily, 0 Refill(s)  Vitamin C: ( 1,000 mg ), daily, 0 Refill(s)   Problem list:    All Problems  Diabetes mellitus type 1 / ICD-9-.01 / Confirmed  Intolerance of drug / SNOMED CT 91315229 / Confirmed  Essential tremor / ICD-9-.1 / Confirmed  Hyperlipidemia / SNOMED CT 27689531 / Confirmed  Hypothyroid / ICD-9-.9 / Confirmed      Histories   Past Medical History:    Active  Hyperlipidemia (09493069)  Hypothyroid (244.9)  Comments:  2010 CST 1:40 PM Kassi Gonzalez CMA  2007  Diabetes mellitus type 1 (250.01)  Comments:  2010 CST 1:41 PM Kassi Gonzalez CMA  2007  Essential tremor (333.1)   Family History:    Diabetes mellitus type I  Father ()  Bowel obstruction  Father ()  CA - Cancer of uterus  Mother ()     Procedure history:    Screening for malignant neoplasm of colon (SNOMED CT 3481439621) on 2015 at 70 Years.  Comments:  2016 8:15 AM - Adali Pantoja is negative  Sialogram (SNOMED CT 631496124) on 2006 at 60 Years.  Comments:  3/29/2013 10:58 AM - Yoanna Porras  \"Stone.  Swollen gland.\"  removal of stone, left mandibular gland in the month of 2005 at 60 " Years.  Sigmoidoscopy ([facility] CT 60453918) performed by Sky Bui MD on 10/17/2002 at 57 Years.   Social History:        Alcohol Assessment: Low Risk            1-2 times per month, 1 drinks/episode average.      Tobacco Assessment: Past      Substance Abuse Assessment: Denies Substance Abuse      Employment and Education Assessment            Retired, Work/School description: /, [employer].  Previous               employment/school: [employer].      Home and Environment Assessment            Marital status: .  Spouse/Partner name: Vicenta.      Exercise and Physical Activity Assessment: Regular exercise            Exercise frequency: Daily.  Exercise type: Aerobics, Walking, Weight lifting.        Physical Examination   VS/Measurements      Health Maintenance      Recommendations     Pending (in the next year)        OverDue           Tetanus Vaccine due  07/26/12  and every 10  year(s)           DM - Communication with Managing Provider due  05/29/16  and every 1  year(s)           DM - Foot Exam due  05/29/16  and every 1  year(s)           Influenza Vaccine due  11/24/16  and every 1  year(s)           DM - HgbA1c due  03/02/17  and every 3  month(s)        Due            Fall Risk Screen (Male) due  06/15/17  and every 1  year(s)           Lung Cancer Screen (Male) due  06/15/17  and every 1  year(s)        Refused            Zoster/Shingles Vaccine due  06/15/17  One-time only        Due In Future            DM - Microalbumin not due until  12/02/17  and every 1  year(s)           Alcohol Misuse Screen (Male) not due until  12/16/17  and every 1  year(s)           Depression Screen (Male) not due until  12/16/17  and every 1  year(s)           Tobacco Use Screen (Male) not due until  12/16/17  and every 1  year(s)           DM - Eye Exam not due until  12/23/17  and every 1  year(s)           High Blood Pressure Screen (Male) not due  until  04/20/18  and every 1  year(s)           Body Mass Index Check (Male) not due until  05/30/18  and every 1  year(s)     Satisfied (in the past 1 year)        Satisfied            Alcohol Misuse Screen (Male) on  12/16/16.           Body Mass Index Check (Male) on  05/30/17.           Body Mass Index Check (Male) on  04/18/17.           Body Mass Index Check (Male) on  08/23/16.           DM - Eye Exam on  12/23/16.           DM - HgbA1c on  12/02/16.           DM - Microalbumin on  12/02/16.           DM - Microalbumin on  12/02/16.           Depression Screen (Male) on  12/16/16.           Depression Screen (Male) on  12/16/16.           Depression Screen (Male) on  12/16/16.           High Blood Pressure Screen (Male) on  04/20/17.           High Blood Pressure Screen (Male) on  12/16/16.           High Blood Pressure Screen (Male) on  12/16/16.           Pneumococcal Vaccine on  04/21/17.           Tobacco Use Screen (Male) on  12/16/16.          Review / Management   Results review:  Lab results   4/20/2017 8:46 AM CDT Glucose Level 220 mg/dL  HI    C-Peptide <0.10 ng/mL   .       Impression and Plan   Diagnosis     Diabetes mellitus type 1 (PHW40-NX E10.9).     Hyperlipidemia NOS (RCG86-UC E11.69).     Overweight (BMI 25.0-29.9) (WHK59-HN E66.3).       Counseled: Patient, Further instruction on AADE7 Self Care Behaviors; Motivational counseling to help achieve goals and congratulated pt on significant improvements in lifestyle.    Discussed living well with diabetes, futher information about diabetes disease process, reviewed chronic condition and appropriate treatment and self management  Healthy Eating - Review carbohydrate counting, reading food labels, appropriate portion sizes, well balanced meals, diabetic plate method as a general rule.  Patient is provided with additional magazines with recipes, meal planning ideas to incorporate dietary recommendations, meal planning and preperation.  Education  review on decreaseing cardiovascular risk with following Therapeutic Lifestyle Changes (TLC) nutrition plan for heart healthy eating.    Being Active - Education on how exercise helps with : pt will continue to work towards goal    - lowering BG by increasing the muscles ability to take up and use glucose   - weight loss   - healthier heart (improve lipid profile)   - improve sleep, mood, energy   - decrease stress      Monitoring - Reviewed ecommended testing schedule and blood glucose target levels.  E  Taking Medication - on Novolog   Problem Solving -    Reviewed Pump settings (basal/bolus), menu options and use, bolus wizzard, suspend, temp basel, BG testing recommendations, hypo/hyperglycemia guidelines, infusion set changes and fill and DKA prevention.  medical support team assistance, resources provided (written); good control of stress  Healthy Coping - support of friends, family, and medical support team   Reducing Risks - Education on importance of good glucose control to help reduce the potential for developing microvascular and macrovacular complications associated with high blood glucose over time.    Education on the following complications    *heart attack/cardiovascular disease - prevention with heart healthy diet, daily activity, and weight control   *retinopathy - annual eye exam   *nephropathy - discussion on annual or prn kidney function labs with urinanalysis    *stroke - maintaining recommended glucose control   *peripheral aterial disease - regular activity, maintaining recommended glucose control   *neuropathy - monofiliment testing, regular activity, maintaining recommended glucose control  Appropriate foot care education  Vaccinations as recommended influenza, pneumonia etc.   Routine dental appts for prevention of periodontal diseases   Importance of adequate sleep   Good skin care, monitor for any open areas, sores, or cuts.         Pt able to verbalize understanding of above education,  handouts provided for additional resources.       Goals:   1.  Practice healthy stress management and get good quality sleep with the goal of 7-8 hours per night.    2.   Physical activity: Recommend 30 min of physical activity on 5 or more days/ week.    3.  Eat in a healthy way, per diabetic plate method.  Nutrition reference: Eat 3 meals a day; snacks are optional.  A meal is three or more food groups; make it colorful for better nutrition.    4. Count carbohydrates and use bolus wizzard for all meals and snacks   5.  BG testing4+x/ daywith pump start Goal premeals 75 - 120; 2 hrs after meals 75 - 140   6.  Be aware of s/sx of hypo/ hyperglycemia and follow treatment protocol   7.  Always have back up pump supplies on hand and vial/ syringe with battery and glucose tablets   8.  follow up in 2 months/ prn       Professional Services   Time spent with pt 60 min   cc Dr. Kb Mcqueen

## 2022-02-15 NOTE — PROGRESS NOTES
Patient:   ROSALIO ANDRADE            MRN: [id number]            FIN: [id number]               Age:   73 years     Sex:  Male     :  1945   Associated Diagnoses:   Hypothyroid; Hyperlipidemia; Diabetes mellitus type 1; Tremor, Essential   Author:   Kb Mcqueen MD      Visit Information      Date of Service: 2018 12:20 pm  Performing Location: [facility]  Encounter#: [id number]      Primary Care Provider (PCP):  Kb Mcqueen MD    NPI# [id number]      Referring Provider:  Kb Mcqueen MD    NPI# [id number]      Chief Complaint   2018 12:34 PM CDT   f/u labs              Additional Information:No additional information recorded during visit.   Chief complaint and symptoms as noted above and confirmed with patient      History of Present Illness   4/10/2014: Rosalio presents to clinic to establish care, previously followed by JAMES approximately the last 10 yrs. he s been diagnosed with adult onset type 1 diabetes, insulin managed with a combination of basal bolus insulin therapy.  Historically his glycemic control has not been optimal. Is very knowledgeable about carbohydrate counting and insulin to carbohydrate ratios.  He says that his main nemesis is evening snacking.  He will typically bolus with NovoLog when eating popcorn, otherwise does not bolus with snacking.  He has no known diabetic complications.  He remains very active with daily exercise, primarily swimming.  He swam back in college.  He resides in [place] for a portion of the year.  He s been resistant to starting on cholesterol medicines in the past.  Also with complaints of left sided elbow/forearm pains - not improved with forearm brace.  He is a retired orchestral director and plays base cello - arm pains limiting his abilities.    2015: Rosalio presents to clinic for regular diabetic follow-up.  No hypoglycemic complaints.  Labs reviewed with him.  Complaints of right-sided anterior shoulder discomfort that it has been  limiting his swimming activities for the past 4-5 months.  He does express some interest in potentially pursuing an insulin pump.  Planning on doing winter camping up in the [facility].    8/11/2017: Presents for follow-up related to his type 1 diabetes. He started on insulin pump therapy this past winter. He has been doing very well with this. He takes off pump for showering and when swimming. Overall is really liked this transition. Minimal issues related to cannulation problems.    12/28/2017: Irwin returns to clinic for follow-up related to his type 1 diabetes.  He has enjoyed pump therapy for most of this year now.  Recently saw Keshia this past week.  Last A1c is 8%.  Adjustments made to pump settings.  He is using bolus wizard on a regular basis.  No issues with hypoglycemia.  No pump malfunction.  He has been able to enjoy his daily swimming without interruption.  Electing not to pursue regular PSA testing.    5/15/2018: Irwin returns for regular diabetic follow-up.  Recently returned from [place].  Rather uneventful winter healthwise.  Doing very well with insulin pump therapy as a whole.  No cannula issues.  Feels like overall blood sugars much better controlled.  Denies any hypoglycemic episodes.  Does describe some morning related myalgias.  Stopped his pravastatin in entirety approximately 1 week ago and states that myalgia substantially improved.    9/21/2018: Irwin returns for follow-up related to his type 1 diabetes.  He has done very well with his insulin pump as a whole.  However today he noticed that his blood sugars have remained persistently high.  He changed out his cannula on a scheduled basis earlier this morning.  Since that time blood sugar readings are staying above 600.  He is bolused twice now since cannula change.  He states he has generally felt well of late.  Denies any recent signs of infection.         Review of Systems   Constitutional:  No fever, No chills.    Eye:  Negative except  as documented in history of present illness.    Ear/Nose/Mouth/Throat:  Negative except as documented in history of present illness.    Respiratory:  No shortness of breath.    Cardiovascular:  No chest pain, No palpitations, No peripheral edema, No syncope.    Gastrointestinal:  No nausea, No vomiting, No abdominal pain.    Genitourinary:  ED, No dysuria, No hematuria.    Hematology/Lymphatics:  Negative except as documented in history of present illness.    Endocrine:  No excessive thirst, No polyuria.    Immunologic:  No recurrent fevers.    Musculoskeletal:  Muscle pain, No joint pain.    Neurologic:  Alert and oriented X4, No numbness, No tingling, No headache.       Health Status   Allergies:    Nonallergic Reactions (Selected)  Severity Not Documented  Atorvastatin (Joint pain)   Medications:  (Selected)   Prescriptions  Prescribed  Contour next test strips: Contour next test strips, See Instructions, Instructions: testing 4x/ day - new start for insulin pump, Supply, # 8 bottle(s), 3 Refill(s), Type: Maintenance, Pharmacy: [facility] [place], testing 4x/ day - new start for insulin pump  Contour test strips and lancets: Contour test strips and lancets, See Instructions, Instructions: testing 4 times daily, # 400 EA, 11 Refill(s), Type: Soft Stop, Pharmacy: [facility] [id number]  Glucagon Emergency Kit for Low Blood Sugar 1 mg injection: See Instructions, Instructions: 1mg injection if pt unable to safely consume glucose, # 1 EA, 0 Refill(s), Type: Maintenance, Pharmacy: [facility], keep on hold, 1mg injection if pt unable to safely consume glucose  Glucose tabs: Glucose tabs, See Instructions, Instructions: use as needed for hypoglycemia, # 100 EA, 3 Refill(s), Type: Maintenance, Pharmacy: [facility]  NovoLOG 100 units/mL injectable solution: See Instructions, Instructions: 70 units/day use with insulin pump     DX  E10.9, # 60 mL, 5 Refill(s), Type: Maintenance, Pharmacy: [facility] [place], 70 units/day use with insulin pump     DX  E10.9  ULTRA-FINE III SHORT PEN NEEDL MG INJECTABLE: ULTRA-FINE III SHORT PEN NEEDL MG INJECTABLE, See Instructions, Instructions: injects 4 times daily, # 400 EA, 11 Refill(s), Pharmacy: [facility] [id number]  aspirin 81 mg oral tablet: 1 tab(s) ( 81 mg ), PO, Daily, # 30 tab(s), 0 Refill(s), Type: Maintenance, OTC (Rx)  levothyroxine 200 mcg (0.2 mg) oral tablet: = 1 tab(s) ( 200 mcg ), po, daily, # 90 tab(s), 1 Refill(s), Type: Maintenance, Pharmacy: [facility] [id number] [facility], keep on hold and pt will notify when needed, 1 tab(s) Oral daily  pravastatin 20 mg oral tablet: = 1 tab(s) ( 20 mg ), PO, hs, # 90 tab(s), 1 Refill(s), Type: Maintenance, Pharmacy: [facility] [id number] [facility], 1 tab(s) Oral hs  Documented Medications  Documented  Cinnamon: ( 1,000 mg ), po, daily, 0 Refill(s), Type: Maintenance  Fish Oil: 0  Flax Seed Oil: 0  Metamucil: See Instructions, Instructions: 1 tbsp daily, 0 Refill(s), Type: Maintenance  Multiple Vitamins oral capsule: 1 cap(s), PO, Daily, 0 Refill(s)  Vitamin C: ( 1,000 mg ), daily, 0 Refill(s)   Problem list:    All Problems  Intolerance of drug / SNOMED CT 18395567 / Confirmed  Essential tremor / SNOMED CT 8845035683 / Confirmed  Hyperlipidemia / SNOMED CT 15565843 / Confirmed  Hypothyroid / SNOMED CT 99346188 / Confirmed  Insulin pump in place / SNOMED CT 5058188618 / Confirmed  Diabetes mellitus type 1 / SNOMED CT 975284908 / Confirmed  Canceled: Long term (current) use of insulin / SNOMED CT 900893286      Histories   Past Medical History:    Active  Hyperlipidemia (81649287)  Hypothyroid (97864736)  Comments:  2010 CST 1:40 PM CST - Kassi Link CMA  2007  Diabetes mellitus type 1 (311705046)  Comments:  2010 CST 1:41 PM CST - NikolayKassi thompson CMA  2007  Essential tremor (1098850658)   Family History:    Diabetes mellitus type I  Father ()  Bowel obstruction  Father ()  CA - Cancer of uterus  Mother  "()     Procedure history:    Screening for malignant neoplasm of colon (2290818415) on 2015 at 70 Years.  Comments:  2016 8:15 AM - Adail Pantojanagi is negative  Sialogram (514590649) on 2006 at 60 Years.  Comments:  3/29/2013 10:58 AM - Yoanna Porras  \"Stone.  Swollen gland.\"  removal of stone, left mandibular gland in the month of 2005 at 60 Years.  Sigmoidoscopy (34916647) on 10/17/2002 at 57 Years.   Social History:        Alcohol Assessment            1-2 times per month, 1 drinks/episode average.      Tobacco Assessment            Former smoker, quit more than 30 days ago, Cigarettes      Employment and Education Assessment            Retired, Work/School description: /, [employer].  Previous               employment/school: [employer].      Home and Environment Assessment            Marital status: .  Spouse/Partner name: Vicenta.      Exercise and Physical Activity Assessment            Exercise frequency: Daily.  Exercise type: Aerobics, Walking, Weight lifting.        Physical Examination   vital signs stable, as noted above   Vital Signs   2018 12:34 PM CDT Temperature Tympanic 98 DegF    Peripheral Pulse Rate 88 bpm    Systolic Blood Pressure 132 mmHg  HI    Diastolic Blood Pressure 60 mmHg    Mean Arterial Pressure 84 mmHg    BP Site Right arm      Measurements from flowsheet : Measurements   2018 12:34 PM CDT   Weight Measured - Standard                180 lb     General:  Alert and oriented, No acute distress.    Eye:  Extraocular movements are intact.    HENT:  Normocephalic, Tympanic membranes are clear, Oral mucosa is moist.    Neck:  Supple, Non-tender, No lymphadenopathy.    Respiratory:  Lungs are clear to auscultation, Respirations are non-labored.    Cardiovascular:  Normal rate, Regular rhythm, No murmur, No edema.    Gastrointestinal:  Soft, Non-tender, Non-distended, Wearing " insulin pump.    Musculoskeletal:  Normal range of motion, Normal strength.    Neurologic:  Alert, Oriented, Normal motor function, No focal deficits.    Cognition and Speech:  Oriented, Speech clear and coherent.    Psychiatric:  Appropriate mood & affect.       Review / Management   Results review:  Lab results   9/14/2018 8:22 AM CDT Hgb A1c 8.1  HI    Cholesterol 197 mg/dL    Non-    HDL 82 mg/dL    Chol/HDL Ratio 2.4      HI    Triglyceride 65 mg/dL    TSH 3.83 mIU/L   .       Impression and Plan   Diagnosis     Hypothyroid (IOJ82-OT E03.9).     Hyperlipidemia (WOF88-AJ E78.5).     Diabetes mellitus type 1 (EOM42-EP E10.9).     Tremor, Essential (IWM99-XT G25.0).       .) type I DM, controlled, late onset diabetes in adulthood (ROLANDO)  hypoglycemic medications:  insulin therapy:  Medtronic pump (initiated in '17): ICR: 1:7, sensitivity 40mg/dL; basal 0.5-0.625/hr (overall liking pump therapy)    - 70% bolus needs  last HbA1c: 8.1%   microvascular complications: none  macrovascular complications: none  ASA/SIMON/statin:  ASA 81mg daily, pravastatin 20mg qhs (advised QOD due to myalgias)    .) in-office hyperglycemia, >600 - suspect pump malfunction   - no overt DKA features   - insertion site changed out in clinic with rebolusing of insulin and blood sugars beginning in trend down    .) [facility]    [facility] maintenance   -Cologuard negative (12/2015)   - PSA 0.3 (11/2015) - declines regular future screening   - enrolled in CCM   - recommending Shingrix    RTC in 4 months      Professional Services   Counseling Summary:  This was a 25 minute visit with greater than 50% of that time spent counseling the patient.

## 2022-02-15 NOTE — PROGRESS NOTES
Patient:   ROSALIO ANDRADE            MRN: [id number]            FIN: [id number]               Age:   74 years     Sex:  Male     :  1945   Associated Diagnoses:   Diabetes mellitus type 1; Hyperlipidemia NOS; Overweight (BMI 25.0-29.9)   Author:   Rupal White      Visit Information   Visit type:  Medical Nutrition Therapy for diabetes management .    Referral source:  Richar RIZZO, Kb.       Chief Complaint   Uncontrolled DM Type 1 (ROLANDO), Hyperlipidemia, Overweight       History of Present Illness   Discussed nutrition, diabetes, and lifestyle management with pt.  Pt is here today to download Medtronic 630G insulin pump.  Pt started pump on 17.    Pt's comfort level with the pump is very happy he made the transition to the pump.  Hgb a1c has increased to 8.5% = 197 eAG increasing trend, goal <8%  Nutrition:  Pt's breakfast intake is VERY high in carbohydrates and pt is not counting accurately.    Grape nuts cereal alone 141grams for 1.5 cups and pt was counting it at most 40 with the milk...  Typical breakfast cereal with milk, whole banana, toast, vegetable juice  Working on trying to bolus 15 min prior to eating   Pt does not frequently bolus for lunch due to being quite low carb.   - pt aware carb intake significantly affects glycemic fluctuations and variability     Physical Activity:  1 hour daily variety ( walk, weights etc), goes to [place] over the winter months and walks 3 miles daily, swimming at least 3 days/ week   Stress:   moderate  Sleep: good  Support: wife  Insulin:  Novolog via pump ~46.82u/ day   BG Testing: ~ 4.1x/ day   Routine diabetes care:  up to date with eye (19), dental, and foot exams - no concerns      BG avg 210 +/- 114 mg/dL (significant variability)  Avg Daily Basal 16.03 (34%)  Avg Daily Bolus 30.79 (66%)  4% (2 readings below 70mg/dL) - improved     ICR=   12:00     7  15:00     8    Sensitivity=  0:00  45  6:30  42   20:30  45      Target     120 - 120     Basal:   16.05  changed to 16.6  MN   0.575  0.575  3am   0.575  0.575  6:30am  0.750  0.750   13:00  06.75  0.700      Insulin action  3.5 hours  Max Bolus    17u  Max Basal   1u/ hr       Health Status   Allergies:    Nonallergic Reactions (Selected)  Severity Not Documented  Atorvastatin (Joint pain)   Medications:  (Selected)   Prescriptions  Prescribed  CONTOUR NEXT TEST STRIP: See Instructions, Instructions: TEST 4 TIMES A DAY, # 400 unknown unit, 3 Refill(s), Type: Maintenance, Pharmacy: [id number], TEST 4 TIMES A DAY  CONTOUR NEXT TEST STRIPS: CONTOUR NEXT TEST STRIPS, See Instructions, Instructions: pt testing 4 times daily- pt has a pump and needing to test more often due to fluctuating BS's - Dx - Z96.41 and E10.9, Supply, # 400 EA, 3 Refill(s), Type: Maintenance, Pharmacy: [facility],...  Glucagon Emergency Kit for Low Blood Sugar 1 mg injection: See Instructions, Instructions: 1mg injection if pt unable to safely consume glucose, # 1 EA, 0 Refill(s), Type: Maintenance, Pharmacy: [facility], keep on hold, 1mg injection if pt unable to safely consume glucose  Glucose tabs: Glucose tabs, See Instructions, Instructions: use as needed for hypoglycemia, # 100 EA, 3 Refill(s), Type: Maintenance, Pharmacy: [facility]  NovoLOG 100 units/mL injectable solution: See Instructions, Instructions: INJECT UP TO 70 UNITS DAILY VIA PUMP-  Dx E10.9, # 90 mL, 3 Refill(s), Type: Maintenance, Pharmacy: [facility], INJECT UP TO 70 UNITS DAILY VIA PUMP-  Dx E10.9  ULTRA-FINE III SHORT PEN NEEDL MG INJECTABLE: ULTRA-FINE III SHORT PEN NEEDL MG INJECTABLE, See Instructions, Instructions: injects 4 times daily, # 400 EA, 11 Refill(s), Pharmacy: [facility] [id number]  Zithromax Z-Aaron 250 mg oral tablet: Take 2 tablets on Day 1 and then 1 tablet, Oral, daily, Instructions: until finished, # 6 tab(s), 0 Refill(s), Type: Maintenance, Pharmacy: [facility], Take 2 tablets on Day 1 and then 1 tablet Oral  daily,Instr:until finished  aspirin 81 mg oral tablet: 1 tab(s) ( 81 mg ), PO, Daily, # 30 tab(s), 0 Refill(s), Type: Maintenance, OTC (Rx)  levothyroxine 200 mcg (0.2 mg) oral tablet: = 1 tab(s) ( 200 mcg ), po, daily, # 90 tab(s), 1 Refill(s), Type: Maintenance, Pharmacy: [facility], keep on hold and pt will notify when needed, 1 tab(s) Oral daily  pravastatin 20 mg oral tablet: = 1 tab(s) ( 20 mg ), PO, hs, # 90 tab(s), 3 Refill(s), Type: Maintenance, Pharmacy: [facility] [id number] [facility], keep on file and pt will notify when needed, 1 tab(s) Oral hs   Problem list:    All Problems  Intolerance of drug / SNOMED CT 33950821 / Confirmed  Essential tremor / SNOMED CT 8296759992 / Confirmed  Hyperlipidemia / SNOMED CT 81148431 / Confirmed  Hypothyroid / SNOMED CT 65401471 / Confirmed  Insulin pump in place / SNOMED CT 9751484422 / Confirmed  Diabetes mellitus type 1 / SNOMED CT 502053721 / Confirmed  Canceled: Long term (current) use of insulin / SNOMED CT 675782888      Histories   Past Medical History:    Active  Hyperlipidemia (48248876)  Hypothyroid (23351033)  Comments:  2010 CST 1:40 PM CST - Kassi Link CMA  2007  Diabetes mellitus type 1 (572819338)  Comments:  2010 CST 1:41 PM Kassi Gonzalez CMA  2007  Essential tremor (8534350535)   Family History:    Diabetes mellitus type I  Father ()  Bowel obstruction  Father ()  [place] - Cancer of uterus  Mother ()     Procedure history:    Colonoscopy (SNOMED CT 224316952) on 2019 at 74 Years.  Comments:  2019 9:23 AM CDT - Malika Joya CMA  Tubular adenoma - negative for high grade dysplasia - Repeat in 3years  Screening for colon cancer (SNOMED CT 0526746790) performed by Kb Mcqueen MD on 2019 at 74 Years.  Comments:  6/3/2019 4:46 PM CDT - Feyereisen MA, Yoanna  Cologuard result:  positive  Recommendation:  needs colonoscopy  Screening for malignant neoplasm of colon (SNOMED CT 7625627689) on 2015 at 70  "Years.  Comments:  1/30/2016 8:15 AM CST - Adali Pantoja is negative  Sialogram (SNOMED CT 389279095) on 2/1/2006 at 60 Years.  Comments:  3/29/2013 10:58 AM CDT - Yoanna Porras  \"Stone.  Swollen gland.\"  removal of stone, left mandibular gland in the month of 12/2005 at 60 Years.  Sigmoidoscopy (SNOMED CT 97368369) performed by Sky Bui MD on 10/17/2002 at 57 Years.  Insulin pump, device (SNOMED CT 6608084818).   Social History:        Alcohol Assessment            1-2 times per month, 1 drinks/episode average.      Tobacco Assessment            Former smoker, quit more than 30 days ago, Cigarettes      Employment and Education Assessment            Retired, Work/School description: /, [employer].  Previous               employment/school: [employer].      Home and Environment Assessment            Marital status: .  Spouse/Partner name: Vicenta.      Exercise and Physical Activity Assessment            Exercise frequency: Daily.  Exercise type: Aerobics, Walking, Weight lifting.        Physical Examination   Measurements from flowsheet : Measurements   12/2/2019 11:37 AM CST Height Measured - Standard 68.5 in    Weight Measured - Standard 182.6 lb    BSA 2 m2    Body Mass Index 27.36 kg/m2  HI         Health Maintenance      Recommendations     Pending (in the next year)        OverDue           Tetanus Vaccine due  07/26/12  and every 10  year(s)           DM - Communication with Managing Provider due  05/29/16  and every 1  year(s)           DM - Foot Exam due  05/29/16  and every 1  year(s)           Alcohol Misuse Screen (Male) due  05/15/19  and every 1  year(s)        Due            Aspirin Therapy for Prevention of CVD (Male) due  11/25/19  and every 5  year(s)           Fall Risk Screen (Male) due  12/02/19  and every 1  year(s)           Hepatitis C Screen [id number] (Male) due  12/02/19  One-time only           " Lung Cancer Screen (Male) due  12/02/19  and every 1  year(s)        Near Due            DM - HgbA1c near due  12/13/19  and every 3  month(s)        Refused            Zoster/Shingles Vaccine due  12/02/19  One-time only        Due In Future            DM - Microalbumin not due until  04/26/20  and every 1  year(s)           DM - Eye Exam not due until  04/29/20  and every 1  year(s)           Depression Screen (Male) not due until  05/03/20  and every 1  year(s)           Influenza Vaccine not due until  09/01/20  and every 1  year(s)           High Blood Pressure Screen (Male) not due until  11/25/20  and every 1  year(s)           Tobacco Use Screen (Male) not due until  11/25/20  and every 1  year(s)     Satisfied (in the past 1 year)        Satisfied            Body Mass Index Check (Male) on  12/02/19.           Body Mass Index Check (Male) on  11/25/19.           Body Mass Index Check (Male) on  07/08/19.           Body Mass Index Check (Male) on  06/03/19.           Colorectal Cancer Screen (Colonoscopy) (Male) on  06/27/19.           Colorectal Cancer Screen (Occult Blood) (Male) on  06/27/19.           Colorectal Cancer Screen (Sigmoidoscopy) (Male) on  06/27/19.           DM - Eye Exam on  04/29/19.           DM - HgbA1c on  09/13/19.           DM - HgbA1c on  04/26/19.           DM - Microalbumin on  04/26/19.           DM - Microalbumin on  04/26/19.           Depression Screen (Male) on  05/03/19.           Depression Screen (Male) on  05/03/19.           Depression Screen (Male) on  05/03/19.           High Blood Pressure Screen (Male) on  11/25/19.           High Blood Pressure Screen (Male) on  09/20/19.           High Blood Pressure Screen (Male) on  09/20/19.           High Blood Pressure Screen (Male) on  05/03/19.           Influenza Vaccine on  09/20/19.           Lipid Disorders Screen (Male) on  09/13/19.           Lipid Disorders Screen (Male) on  09/13/19.           Lipid Disorders Screen  (Male) on  09/13/19.           Lipid Disorders Screen (Male) on  09/13/19.           Tobacco Use Screen (Male) on  11/25/19.           Tobacco Use Screen (Male) on  09/20/19.           Tobacco Use Screen (Male) on  05/03/19.          Review / Management   Results review:  Lab results   9/13/2019 8:21 AM CDT Hgb A1c 8.5  HI    Cholesterol 205 mg/dL  HI    Non-  HI    HDL 71 mg/dL    Chol/HDL Ratio 2.9      HI    Triglyceride 68 mg/dL    TSH 0.10 mIU/L  LOW   .       Impression and Plan   Diagnosis     Diabetes mellitus type 1 (SKT55-PN E10.9).     Hyperlipidemia NOS (JKP04-UE E11.69).     Overweight (BMI 25.0-29.9) (QCO76-LO E66.3).       Counseled: Patient, Further instruction on AADE7 Self Care Behaviors  Discussed living well with diabetes, further information about diabetes disease process, reviewed chronic condition and appropriate treatment and self management  Healthy Eating - Large focus on accuracy of carbohydrate counting.  Pt has calorie sander at home and will also start to measure food and read labels.  Will not change insulin to carb ratio at this time as his carb counting has been on the very low side especially for breakfast foods.  Reviewed one way of trying to reduce glycemic variability through following a carbohydrate controlled meal plan.  BG in office 408mg/dL.  Pt given meal planning ideas to incorporate dietary recommendations,  Education review on decreasing cardiovascular risk with following Therapeutic Lifestyle Changes (TLC) nutrition plan for heart healthy eating and healthy eating through the holidays.    Being Active - Education on how exercise helps with : use temporary basal for exercise   - lowering BG by increasing the muscles ability to take up and use glucose   - weight loss   - healthier heart (improve lipid profile)   - improve sleep, mood, energy   - decrease stress      Monitoring - Reviewed recommended testing schedule and blood glucose target levels.  Test prior  to every meal.  Discussed options for Dexcom G6 CGMS as pt would be an excellent candidate, pt declines at this time as he does not want anything else attached to him.    Taking Medication - on NovoLog   Problem Solving -  adjusted pump settings   Reviewed Pump settings (basal/bolus), menu options and use, bolus wizard suspend, temp basal BG testing recommendations, hypo/hyperglycemia guidelines, infusion set changes and fill and DKA prevention.  medical support team assistance, resources provided (written); good control of stress, pump adjustments made today slightly but elevated reading and low readings likely related to user recommended routine changes.  Bolus early 20+ min prior to meals, test BG prior to ALL meals, bolus for ALL snacks >5gm CHO, use temp basal for exercise  Healthy Coping - support of friends, family, and medical support team   Reducing Risks - Detailed education on potential complications associated with uncontrolled diabetes and prevention recommendations.  Recommendations include: annual eye exam, good oral cares with brushing BID and flossing daily, routine dental apts. good foot care tips and shoe wear - discussed monofilament test yearly.  Importance of adequate sleep and good control of BG to prevent developing complications related to uncontrolled DM including nephropathy, neuropathy, retinopathy, and cardiovascular disease.  Weight loss goal of 7 % of current body weight pounds as a reasonable goal to help increase insulin sensitivity      Pt able to verbalize understanding of above education, handouts provided for additional resources.       Goals:   1.  Practice healthy stress management and get good quality sleep with the goal of 7-8 hours per night.    2.   Physical activity: Recommend 30 min of physical activity on 5 or more days/ week.    3.  Eat in a healthy way, per diabetic plate method.  Nutrition reference: Eat 3 meals a day; snacks are optional.  A meal is three or more  food groups; make it colorful for better nutrition.    4. Count carbohydrates and use bolus wizard for all meals and snacks bolus 15-20min prior to eating   5.  BG testing4+x/ day  Goal pre-meals 75 - 120; 2 hrs after meals 75 - 140   6.  Be aware of s/sx of hypo/ hyperglycemia and follow treatment protocol   7.  Always have back up pump supplies on hand and vial/ syringe with battery and glucose tablets   8.  follow up in 3 month/ return from [facility]       Professional Services   Time spent with pt 60 min   cc Dr. Kb Mcqueen

## 2022-02-15 NOTE — PROGRESS NOTES
Patient:   ROSALIO ANDRADE            MRN: [id number]            FIN: [id number]               Age:   72 years     Sex:  Male     :  1945   Associated Diagnoses:   None   Author:   Rupal White      Doctor: bK Mcqueen   Patient Information  (Medicare and address information is on file)  Medicare HICN#: _  Address:_ City:_ State:_ Zip:_  Home Phone:_ Work Phone:_ Other Contact Phone:_    Diabetes self-management training (DSMT) and medical nutrition therapy (MNT) are individual and complementary services to improve diabetes care. For Medicare beneficiaries, both services can be ordered in the same year. Research indicates MNT combined with DSMT improves outcomes.    Diabetes Self-Managment Training (DSMT)  Medicare: 10 hours initial DSMT in 12 month period, plus 2 hours follow-up annually  *Check type of training services and number of hours requested:  _ Initial DSMT:  10 hours or _ no. hrs. requested  X Follow-up DSMT: X 2 hours or _ no. hrs. requested  PLUS X Additional insulin trainin no. hrs. requested for insulin pump education     *Patients with special needs requiring individual DSMT  Check all special needs that apply:  _ Vision _ Hearing  _ Physical  _Cognitive Impairment  _ Language limitations X Other  No classes offered    *DSMT Content  X All ten content areas, as appropriate  _ Monitoring diabetes   _ Diabetes as disease process  _ Psychological adjustment _ Physical activity  _ Nutritional management  _ Goal setting, problem solving  _ Medications     _ Prevent, detect and treat acute complications  _ Preconception/pregnancy _ Prevent, detect and teat chronic complications     management or gestational     diabetes management    Medical Nutrition Therapy (MNT)  Medicare: 3 hours initial MNT in the first calendar year, plus two hours follow-up MNT annually. Additional MNT hours available for change in medical condition, treament and/or diagnosis.  *Check the type of MNT and/or number of  additional hours requested:  _ Initial MNT: X Annual follow-up MNT  _ Additional MTNservices in the same calendar year, per RD recommendations  _ no. additional hrs. requested    Please specify change in medical condition, treatment and/or diagnosis      *Diagnosis  Please send recent labs for patient eligibility & outcomes monitoring  X Type 1 uncontrolled _ Type 1 controlled  _ Type 2 uncontrolled _ Type 2 controlled  _ Gestational diabetes _ Other _    Complications/Comorbidities  Check all that apply:  _ Hypertension   X Dyslipidemia  _ Stroke  _ Neuropathy   _ Nephropathy  _ PVD  _ Renal disease   _ Retinopathy  _ CHD  _ Non-healing wound _ Pregnancy   _ Obesity  _ Mental/affective disorder      _ Other _    Current Diabetes Medications  Specify type, dose and frequency  Oral: _  Insulin: Lantus 20u, Novolog ~50 - 60u total divided throughout the day (correction factor 40, I:C ratio 7)  Patient now uses: X Pen _ Needle _ Pump    Patient Behavior Goals/Plan of Care    To improve blood sugar control to help prevent potential complications associated with uncontrolled diabetes, while minimizing the risk for hypoglycemia.  Education on insulin pump.      Signature and UPIN #: (UPIN and NPI are on file)  Group/Practice name, address and phone: [facility], [address] [place] [address]  [address] [phone]

## 2022-02-15 NOTE — NURSING NOTE
Quick Intake Entered On:  7/8/2019 8:59 AM CDT    Performed On:  7/8/2019 8:59 AM CDT by Rupal White               Summary   Weight Measured :   177.2 lb(Converted to: 177 lb 3 oz, 80.38 kg)    Height Measured :   68.5 in(Converted to: 5 ft 8 in, 173.99 cm)    Body Mass Index :   26.55 kg/m2 (HI)    Body Surface Area :   1.97 m2   Rupal White - 7/8/2019 8:59 AM CDT

## 2022-02-15 NOTE — TELEPHONE ENCOUNTER
---------------------  From: Jyoti James CMA (Phone Messages Pool ([id number]))   To: [facility] Message Pool ([id number]); Care Coordinators Pool ([facility]);     Sent: 9/25/2019 11:37:17 AM CDT  Subject: FW: Pharmacies           ---------------------  From: ROSALIO ANDRADE  To: [facility]  Sent: 09/25/2019 11:33 a.m. CDT  Subject: Pharmacies  KENDRICK Jewell, we transferred from [facility] to [facility] and they are setting up my latest prescription for Contour Next Test strips. [facility] is out of our network. Thanks for all  your help.    Rosalio Andrade.---------------------  From: Malika Joya CMA ([facility] Message Pool ([id number]))   To: ROSALIO ANDRADE    Sent: 9/25/2019 12:58:54 PM CDT  Subject: RE: Pharmacies     Perfect,  Thanks for letting us know!    Malika

## 2022-02-15 NOTE — PROGRESS NOTES
Patient:   ROSALIO ANDRADE            MRN: [id number]            FIN: [id number]               Age:   76 years     Sex:  Male     :  1945   Associated Diagnoses:   Diabetes mellitus type 1; Hyperlipidemia NOS; Overweight (BMI 25.0-29.9)   Author:   Rupal White      Visit Information   Visit type:  Medical Nutrition Therapy for DiabetesManagement .    Referral source:  Richar RIZZO, Kb.       Chief Complaint   DM Type 1 (ROLANDO), Hyperlipidemia, Overweight       History of Present Illness   Discussed nutrition, diabetes, and lifestyle management with pt.  Pt started Medtronic 630G pump on 17. Pt started Dexcom G6 CGMS 2020    a1c improved! 7.5% = 177 eAG; meeting pt's personal target of <8% and higher than ADA goal of <7%.    Pt really likes the Dexcom G6, benefits from the low alarms and states he can treat a low when the alarm goes off   Nutrition: slowly improving controlled carbohydrates but still will often have large carb loads, wide variety of foods with adequate lean protein, fruit and vegetables  Physical Activity: significantly decreased, left shoulder rotator cuff repair due to a fall, surgery completed 5/3/2021, recovery with range of motion unsuccessful required a second surgery.    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 - exam 2021 report in chart, dental routine q 6+ mo 21, and foot exams UTD- no concerns    ICR  12:00     6  5:00am     5 changing to 5.5  15:00     6     Sensitivity  0:00      37 changing to 40  adding 20:00  40    Target   120 - 120 changing to 125 - 125    Basal:   20.213 decreased slightly to 19.9  MN   0.575  3am   0.675  0.650    6:30am  0.95    0.925  13:00 0.95   18:00 0.95    22:00  0.80   0.775    Insulin action  3.5 hours  Max Bolus    17u  Max Basal   1u/ hr     30 day Average sensor glucose 166 mg/dL (87% CGM active)     Time in target range 70 - 180  62% (goal 70%+)  High 181 - 250    21%  Very High >250    14%  Low 54 - 69     2.0%  Very low <54     <1%    Basal 48%  Bolus 52%    Total daily dose 41.9u          Health Status   Allergies:    Allergic Reactions (Selected)  No known allergies   Medications:  (Selected)   Prescriptions  Prescribed  CONTOUR NEXT TEST STRIP: See Instructions, Instructions: TEST 4 TIMES A DAY, # 400 unknown unit, 3 Refill(s), Type: Maintenance, Pharmacy: [id number], TEST 4 TIMES A DAY  CONTOUR NEXT TEST STRIPS: CONTOUR NEXT TEST STRIPS, See Instructions, Instructions: pt testing 4 times daily- pt has a pump and needing to test more often due to fluctuating BS's - Dx - Z96.41 and E10.9, Supply, # 400 EA, 3 Refill(s), Type: Maintenance, Pharmacy: [facility],...  Dexcom G6 : Dexcom G6 , See Instructions, Instructions: use to see glucose readings, Supply, # 1 EA, 1 Refill(s), Type: Maintenance  Dexcom G6 sensors: Dexcom G6 sensors, See Instructions, Instructions: change sensor every 10 days  E10.9, Supply, # 9 EA, 11 Refill(s), Type: Maintenance  Dexcom G6 transmitter: Dexcom G6 transmitter, See Instructions, Instructions: change every 3 months, Supply, # 1 EA, 3 Refill(s), Type: Maintenance  Glucose tabs: Glucose tabs, See Instructions, Instructions: use as needed for hypoglycemia, # 100 EA, 3 Refill(s), Type: Maintenance, Pharmacy: [facility]  NovoLOG 100 units/mL injectable solution: See Instructions, Instructions: INJECT UP TO 70 UNITS DAILY VIA PUMP-  Dx E10.9, # 90 mL, 3 Refill(s), Type: Maintenance, Pharmacy: [facility], INJECT UP TO 70 UNITS DAILY VIA PUMP-  Dx E10.9, 67, in, 12/01/20 8:34:00 CST, Height Measured, 191.4, lb...  ULTRA-FINE III SHORT PEN NEEDL MG INJECTABLE:  ULTRA-FINE III SHORT PEN NEEDL MG INJECTABLE, See Instructions, Instructions: injects 4 times daily, # 400 EA, 11 Refill(s), Pharmacy: [id number]  aspirin 81 mg oral tablet: 1 tab(s) ( 81 mg ), PO, Daily, # 30 tab(s), 0 Refill(s), Type: Maintenance, OTC (Rx)  levothyroxine 175 mcg (0.175 mg) oral tablet: = 1 tab(s), Oral, daily, # 30 tab(s), 0 Refill(s), Type: Maintenance, Pharmacy: [facility], due for visit next month, 1 tab(s) Oral daily, 67, in, 08/19/21 12:37:00 CDT, Height Measured, 188.3, lb, 08/19/21 12:37:00 CDT, Weight Measured  pravastatin 20 mg oral tablet: = 1 tab(s), Oral, qhs, # 30 tab(s), 0 Refill(s), Type: Maintenance, Pharmacy: [facility], due for a visit next month, 1 tab(s) Oral qhs, 67, in, 08/19/21 12:37:00 CDT, Height Measured, 188.3, lb, 08/19/21 12:37:00 CDT, Weight Measured  Documented Medications  Documented  Metamucil: Oral, 0 Refill(s), Type: Maintenance,    Medications          *denotes recorded medication          ULTRA-FINE III SHORT PEN NEEDL MG INJECTABLE: See Instructions, injects 4 times daily, 400 EA.          CONTOUR NEXT TEST STRIP: See Instructions, TEST 4 TIMES A DAY, 400 unknown unit, 3 Refill(s).          CONTOUR NEXT TEST STRIPS: See Instructions, pt testing 4 times daily- pt has a pump and needing to test more often due to fluctuating BS's - Dx - Z96.41 and E10.9, 400 EA, 3 Refill(s).          Glucose tabs: See Instructions, use as needed for hypoglycemia, 100 EA.          Dexcom G6 : See Instructions, use to see glucose readings, 1 EA, 1 Refill(s).          Dexcom G6 sensors: See Instructions, change sensor every 10 days  E10.9, 9 EA, 11 Refill(s).          Dexcom G6 transmitter: See Instructions, change every 3 months, 1 EA, 3 Refill(s).          aspirin 81 mg oral tablet: 81 mg, 1 tab(s), PO, Daily, 30 tab(s).          NovoLOG 100 units/mL injectable solution: See Instructions, INJECT UP TO 70 UNITS DAILY VIA PUMP-  Dx E10.9, 90 mL, 3  Refill(s).          levothyroxine 175 mcg (0.175 mg) oral tablet: 1 tab(s), Oral, daily, 30 tab(s), 0 Refill(s).          pravastatin 20 mg oral tablet: 1 tab(s), Oral, qhs, 30 tab(s), 0 Refill(s).          *Metamucil: Oral, 0 Refill(s).       Problem list:    All Problems  Intolerance of drug / SNOMED CT 50237858 / Confirmed  Insulin pump in place / SNOMED CT 6075978588 / Confirmed  Hypothyroid / SNOMED CT 09849506 / Confirmed  Hyperlipidemia / SNOMED CT 53198513 / Confirmed  Essential tremor / SNOMED CT 8634406499 / Confirmed  Diabetes mellitus type 1 / SNOMED CT 793554428 / Confirmed  Canceled: Long term (current) use of insulin / SNOMED CT 676239927      Histories   Past Medical History:    Active  Hyperlipidemia (95079010)  Hypothyroid (02235101)  Comments:  2010 CST 1:40 PM Kassi Gonzalez CMA  2007  Diabetes mellitus type 1 (586081746)  Comments:  2010 CST 1:41 PM Kassi Gonzalez CMA  2007  Essential tremor (0685641530)   Family History:    Diabetes mellitus type I  Father ()  Bowel obstruction  Father ()  [place] - Cancer of uterus  Mother ()     Procedure history:    Repair of rotator cuff of shoulder (SNOMED CT 4897721956) performed by John Paul DO on 2021 at 76 Years.  Comments:  2021 10:50 AM Marianna Ryan, after injury when reaching for an object.    2021 10:47 AM Marianna Ryan.  Rotator cuff repair (SNOMED CT 883439584) performed by John Paul DO on 5/3/2021 at 76 Years.  Comments:  2021 12:22 PM Marianna Ryan.  Colonoscopy (SNOMED CT 621729846) performed by Mikhail Dawson MD on 2019 at 74 Years.  Comments:  2020 8:52 AM Marianna Ryan  Indication: Positive Cologuard.             Sedation: MAC.    2019 9:23 AM CDT - Mahnaz MAHER, Malika  Tubular adenoma - negative for high grade dysplasia - Repeat in 3years  Screening for colon cancer (SNOMED CT 7767761692) performed by  "Richar RIZZO, Kb on 5/20/2019 at 74 Years.  Comments:  6/3/2019 4:46 PM CDT - Yoanna Danielson MA result:  positive  Recommendation:  needs colonoscopy  Screening for malignant neoplasm of colon (SNOMED CT 0340073152) on 12/7/2015 at 70 Years.  Comments:  1/30/2016 8:15 AM CST - Adali Pantoja is negative  Sialogram (SNOMED CT 584906347) on 2/1/2006 at 60 Years.  Comments:  3/29/2013 10:58 AM CDT - Yoanna Porras  \"Stone.  Swollen gland.\"  removal of stone, left mandibular gland in the month of 12/2005 at 60 Years.  Sigmoidoscopy (SNOMED CT 92795298) performed by Sky Bui MD on 10/17/2002 at 57 Years.  Insulin pump, device (SNOMED CT 0923385599).   Social History:        Electronic Cigarette/Vaping Assessment            Electronic Cigarette Use: Never.      Alcohol Assessment            1-2 times per month, 1 drinks/episode average.      Tobacco Assessment            Former smoker, quit more than 30 days ago, Cigarettes      Employment and Education Assessment            Retired, Work/School description: /, [employer].  Previous               employment/school: [employer].      Home and Environment Assessment            Marital status: .  Spouse/Partner name: Vicenta.      Exercise and Physical Activity Assessment            Exercise frequency: Daily.  Exercise type: Aerobics, Walking, Weight lifting.        Physical Examination   VS/Measurements      Review / Management   Results review:  Lab results   8/19/2021 1:07 PM CDT Sodium Level 134 mmol/L  LOW    Potassium Level 4.5 mmol/L    Chloride Level 101 mmol/L    CO2 Level 25 mmol/L    Glucose Level 230 mg/dL  HI    BUN 15 mg/dL    Creatinine 0.87 mg/dL    BUN/Creat Ratio NOT APPLICABLE    eGFR 84 mL/min/1.73m2    eGFR African American 98 mL/min/1.73m2    Calcium Level 8.8 mg/dL    Hgb A1c 7.8  HI   .       Impression and Plan   Diagnosis     Diabetes mellitus type 1 " (IWR46-VZ E10.9).     Hyperlipidemia NOS (QTK96-OU E11.69).     Overweight (BMI 25.0-29.9) (OMM71-JZ E66.3).       Counseled: Patient, Further instruction on AADE7 Self Care Behaviors  Discussed living well with diabetes, further information about diabetes disease process, reviewed chronic condition and appropriate treatment and self management  Healthy Eating - Large focus on accuracy of carbohydrate counting and decreasing carb load at meals/ dispersing them throughout the day - pt admits he likes to eat large portions.  Reviewed one way of trying to reduce glycemic variability through following a carbohydrate controlled meal plan - pt will continue to work on this.  Additional meal planning ideas to incorporate dietary recommendations,  Education review on decreasing cardiovascular risk with following Therapeutic Lifestyle Changes (TLC) nutrition plan for heart healthy eating and healthy eating, discussed plant based meals.    Being Active - Education on how exercise helps with : will resume as able continue with PT    - lowering BG by increasing the muscles ability to take up and use glucose   - weight loss   - healthier heart (improve lipid profile)   - improve sleep, mood, energy   - decrease stress      Monitoring - Continue with Dexcom G6 and use BG testing as needed  Patient continues to meet criteria for CGM coverage;   - patient has type one diabetes mellitus; and  - patient has been using CGM daily; and  - patient is insulin treated with a continues infusion of insulin via pump; and  - patient is frequently adjusting insulin on the basis of CGM readings  - within six months prior to ordering the CGM, the patient has had an in-person visit with the practitioner; and determined that criteria (1-4) above are met; and   - every six months following the initial prescription of the CGM, the treating practitioner has an in-person visit with the patient to assess adherence to their CGM regimen and diabetes  treatment plan    Taking Medication - on NovoLog via pump   Problem Solving -  Reviewed Pump settings (basal/bolus), menu options and use, bolus wizard suspend, temp basal BG testing recommendations, hypo/hyperglycemia guidelines, infusion set changes and fill and DKA prevention.  Wear sensor and change every 10 days   medical support team assistance, resources provided (written); good control of stress, pump adjustments made today slightly but elevated reading and low readings likely related to user recommended routine changes.  Bolus early 20+ min prior to meals, test BG prior to ALL meals, bolus for ALL snacks >5gm CHO, use temp basal for exercise  Healthy Coping - support of friends, family, and medical support team   Reducing Risks - Detailed education on potential complications associated with uncontrolled diabetes and prevention recommendations.  Recommendations include: annual eye exam, good oral cares with brushing BID and flossing daily, routine dental apts. good foot care tips and shoe wear - discussed monofilament test yearly.  Importance of adequate sleep and good control of BG to prevent developing complications related to uncontrolled DM including nephropathy, neuropathy, retinopathy, and cardiovascular disease.  Weight loss goal of 7 % of current body weight pounds as a reasonable goal to help increase insulin sensitivity      Pt able to verbalize understanding of above education, handouts provided for additional resources.       Goals:   1.  Practice healthy stress management and get good quality sleep with the goal of 7-8 hours per night.    2.   Physical activity: Recommend 30 min of physical activity on 5 or more days/ week.    3.  Eat in a healthy way, per diabetic plate method.  Nutrition reference: Eat 3 meals a day; snacks are optional.  A meal is three or more food groups; make it colorful for better nutrition.    4. Count carbohydrates and use bolus wizard for all meals and snacks bolus  15-20min prior to eating   5.  Use sensor and if not available BG testing 4+x/ day  Goal pre-meals 75 - 120; 2 hrs after meals 75 - 140   6.  Be aware of s/sx of hypo/ hyperglycemia and follow treatment protocol   7.  Always have back up pump supplies on hand and vial/ syringe with battery and glucose tablets   8. Follow up in 2 mo   9.  A1c <7%  Time in range 80 - 180 >70%      Professional Services   Time spent with pt 60 min   cc Dr. Kb Mcqueen

## 2022-02-15 NOTE — TELEPHONE ENCOUNTER
Entered by Charles Harris CMA on September 25, 2020 10:26:14 AM CDT  ---------------------  From: Charles Harris CMA   To: [facility]    Sent: 9/25/2020 10:26:14 AM CDT  Subject: Medication Management     ** Submitted: **  Complete:pravastatin (pravastatin 20 mg oral tablet)   Signed by Charles Harris CMA  9/25/2020 3:26:00 PM [facility]    ** Submitted: **  Complete:levothyroxine (levothyroxine 175 mcg (0.175 mg) oral tablet)   Signed by Charles Harris CMA  9/25/2020 3:26:00 PM [facility]    ** Approved with modifications: **  levothyroxine (LEVOTHYROXINE 200MCG TABLET)  TAKE 1 TABLET BY MOUTH ONCE DAILY  Qty:  90 unknown unit        Days Supply:  0        Refills:  0          Substitutions Allowed     Route To Pharmacy - [facility]   Signed by Charles Harris CMA    ** Approved with modifications: **  pravastatin (PRAVASTATIN 20MG TAB TABLET)  TAKE 1 TABLET BY MOUTH ONCE DAILY AT BEDTIME  Qty:  90 unknown unit        Days Supply:  0        Refills:  0          Substitutions Allowed     Route To Pharmacy - [facility]   Note to Pharmacy:  Pt will need to be seen before next refill  Signed by Charles Harris CMA            ** Patient matched by Charles Harris CMA on 9/25/2020 10:23:04 AM CDT **      ------------------------------------------  From: [employer]  To: Kb Mcqueen MD  Sent: September 25, 2020 9:39:22 AM CDT  Subject: Medication Management  Due: September 9, 2020 4:20:39 PM CDT     ** On Hold Pending Signature **     Dispensed Drug: levothyroxine (levothyroxine 200 mcg (0.2 mg) oral tablet), TAKE 1 TABLET BY MOUTH ONCE DAILY  Quantity: 90 unknown unit  Days Supply: 0  Refills: 0  Substitutions Allowed  Notes from Pharmacy:     ** On Hold Pending Signature **     Dispensed Drug: pravastatin (pravastatin 20 mg oral tablet), TAKE 1 TABLET BY MOUTH ONCE DAILY AT BEDTIME  Quantity: 90 unknown unit  Days Supply: 0  Refills: 0  Substitutions Allowed  Notes from Pharmacy:  ------------------------------------------Med  Refill      Date of last office visit and reason:  5-19-20 dm      Date of last Med Check / Px:     Date of last labs pertaining to med:  BMP/TSH-5-5-20, lipid 9-13-19    Note:  Rx filled per protocol.  Charles Harris [facility]    RTC order in chart:  yes    For Protocol refill, has patient been contacted:  _

## 2022-02-15 NOTE — TELEPHONE ENCOUNTER
---------------------  From: Malika Joya CMA (Phone Messages Pool ([id number]_[facility]))   To: Kb Mcqueen MD;     Sent: 1/2/2020 12:55:14 PM CST  Subject: FW: Prescription for Rosalio Malloy.-consumer message     I forgot to include my cell No. in the last message. It is [phone].  Thanks,  Rosalio Malloy.        ---------------------  From: ROSALIO MALLOY  To: [facility]  Sent: 01/02/2020 09:49 a.m. CST  Subject: Prescription for Rosalio Malloy.  Merrill Kahn,  I have developed a bad cold and would like a prescription of a Z Pac.filled at the [facility] in [place], [place]. That worked the last time I was at the clinic. That [facility], Pharmacy Number is [phone] and the Fax number is [phone]. My brother-in-law Carrillo Larsen will  the prescription in [place] when ready. Thanks so much.    Rosalio Malloy.same symptoms as October  chest congestion, productive cough, no energy, nasal congestion-no fever, rec'd script in Oct from [facility] and really helped  symptoms started last Saturday and was on the plane [place] s[place]  BS's have been elevated---------------------  From: Kb Mcqueen MD   To: Phone Messages Pool ([id number]_[place] [facility] [place]);     Sent: 1/2/2020 1:33:49 PM CST  Subject: RE: Prescription for Rosalio Malloy.-consumer message     yes, I'm fine with Z-pack trial.  If not improving, would recommend he be evaluated locally.  ** Submitted: **  Order:azithromycin (azithromycin 250 mg oral tablet)  1 packet(s)  Oral  once  as directed on package labeling  Qty:  6 tab(s)        Refills:  0          Substitutions Allowed     Route To Pharmacy - [id number]    Signed by Malika Joya CMA  1/2/2020 1:46:00 PMpt contacted at [phone] and advised

## 2022-02-15 NOTE — TELEPHONE ENCOUNTER
Entered by Norma Enciso CMA on July 31, 2019 9:48:06 AM CDT  ---------------------  From: Norma Enciso CMA   To: [id number]    Sent: 7/31/2019 9:48:06 AM CDT  Subject: Medication Management     ** Submitted: **  Order:insulin aspart (NovoLOG 100 units/mL injectable solution)  See Instructions  INJECT UP TO 70 UNITS DAILY VIA PUMP  Qty:  30 mL        Refills:  0          Substitutions Allowed     [id number]    Signed by Norma Enciso CMA  7/31/2019 9:46:00 AM    ** Submitted: **  Complete:insulin aspart (NovoLOG 100 units/mL injectable solution)   Signed by Norma nEciso CMA  7/31/2019 9:48:00 AM    ** Not Approved:  **  insulin aspart (NOVOLOG 100 UNIT/ML VIAL)  INJECT UP TO 70 UNITS DAILY VIA PUMP  Qty:  70 mL        Days Supply:  90        Refills:  1          Substitutions Allowed     [id number]   Signed by Norma Enciso CMA            ------------------------------------------  [id number]  To: Kb Mcqueen MD  Sent: July 30, 2019 1:46:28 AM CDT  Subject: Medication Management  Due: July 31, 2019 1:46:28 AM CDT    ** On Hold Pending Signature **  Drug: insulin aspart (NovoLOG 100 units/mL injectable solution)  INJECT UP TO 70 UNITS DAILY VIA PUMP  Quantity: 90 mL       Days Supply: 15        Refills: 0  Substitutions Allowed  Notes from Pharmacy:     Dispensed Drug: insulin aspart (NovoLOG 100 units/mL injectable solution)  INJECT UP TO 70 UNITS DAILY VIA PUMP  Quantity: 70 mL       Days Supply: 90        Refills: 1  Substitutions Allowed  Notes from Pharmacy:   ------------------------------------------Date of last office visit and reason:  5/3/19 DM check      Date of last Med Check / Px:   5/3/19  Date of last labs pertaining to med:  4/26/19 9/14/18    RTC order in chart:  yes, patient due 09/2019 labs    For Protocol refill, has patient been contacted:  _

## 2022-02-15 NOTE — TELEPHONE ENCOUNTER
Entered by Malika Joya CMA on September 17, 2019 11:02:22 AM CDT  updated script      ---------------------  From: ROSALIO ANDRADE  To: [employer] Message Pool ([id number]_[facility])  Sent: 09/17/2019 09:10 a.m. CDT  Subject: RE: Contour Test Strips  I am testing 4 times daily sometimes 5 but not very often.  Aashish  ---------------------  From: Malika Joya CMA ([employer] Message Pool ([id number]_[facility]))  To: ROSALIO ANDRADE  Sent: 9/17/2019 8:03:04 AM CDT  Subject: RE: Contour Test Strips       Merrill Gayle,       How often are you testing your blood sugars?  Your current prescription that was sent in on 9/14 to [facility] reads 4 times daily.       Malika            ---------------------  From: Norma Enciso CMA (Phone Messages Pool ([id number]_[facility]))  To: [employer] Message Pool ([id number]_[facility]);  Sent: 9/16/2019 3:51:01 PM CDT  Subject: FW: Contour Test Strips                      ---------------------  From: ROSALIO ANDRADE  To: [facility]  Sent: 09/16/2019 03:44 p.m. CDT  Subject: Contour Test Strips  Dr. Kb Mcqueen,  When I tried to fill my prescription of Contour Test Strips, [facility], the pharmacist informed me that Medicare is only allowing 3 test strips per day for diabetics. That has been difficult for me so I m wondering if you would change my prescription? I have an appointment with you this Friday.  Rosalio Andrade.

## 2022-02-15 NOTE — CARE COORDINATION
Patient:   ROSALIO ANDRADE            MRN: [id number]            FIN: [id number]               Age:   72 years     Sex:  Male     :  1945   Associated Diagnoses:   None   Author:   Mary Leung CMA      Comprehensive Care Plan    Risk Assessment Score:  _    Medical Health Care Team:  (Care Team Members are people and/or organizations who help you manage your health)    Primary Care Provider: _  Kb Mcqueen MD    Clinical Assistant: _ Malika SARAVIA CMA       Care Coordinator Name:  _ Mary Leung CMA.     Other: _ Dentist: Dr. [facility] in [place]             _ Eye Dr. : Dr. Avalos in [place]   (Specialists, Dentist, Eye Care, Home Health Agencies, Meals on Wheels, Physical Therapy, Dialysis, Housing Facilities, Respite/ Programs)     I have:  _ Advance Directives     _X Living Will     _ Power of      _ Other:      I would like to communicate by:  _X Phone   _X Portal   _ Letters   _ Face to face visit  _ Other: _    I live with:  _my spouse: Vicenta    I learn best by:  _ being shown    Emergency Contact:  _Keshia White: [phone]    Personal Support Team:  (Family Members, Hindu, Neighbors, Anyone who will help)  _ Wife Vicenta  _ Hindu-Leona Mony [place]:Sonia Koehler   _ Family members  _  _    On a scale from 1 to 10: (1 not good at all - 10 my health is great)   1.  Most of the time would you say your health is:  _9     2.  Compared to one year ago, how would you rate your health in general now:  _  Much better     _X  Somewhat better     _About the same     _  Somewhat worse     _  Much worse    I need assistance with the following activities:   _No Concerns  _  _     I have challenges and/or concerns with:   (Identify the types of problems or concerns you are currently facing as you manage your health.  Sharing your concerns helps your Care Team assist you.)     _ Thinking/Memory Problems     _ Spiritual Support     _ Vision     _ Transportation     _ Family Issues     _ Emotional  Issues     _ Stress Management     _ Hearing/Hearing Aides     _ Access to Health Care     _ Financial Issues     _X No Concerns at this time      Weight/Diet/Exercise needs:   _DM Diet,          Tobacco/Alcohol Use:  _Former    Fall Risk/Concerns:  _none    Safety:  _ feels safe in his home and community    My goals for working toward better health:    _ Continue exercising every day  _ Continue countying carbs and making adjustements with pump  _ Continue eating a balanced diet  _    What small steps can I take to reach these goals?  What will make reaching these goals difficult? :    _ Work carefully with my new insulin pump  _ Travel  _ Stop snacking at night  _    Your Providers goals toward better health:    _  _  _  _    The MOST important information you need to know about me:  (This is for you to record important details about your health and life that will help health care professionals understand your needs.)    _ I need a cardio workout each da. Sometimes it is hard to achieve        Medications:          *denotes recorded medication          ULTRA-FINE III SHORT PEN NEEDL MG INJECTABLE: See Instructions, injects 4 times daily, 400 EA.          B-D PEN NDL SHRT 45EF3SL(5/16) CHRISTINA: See Instructions, INJECT FOUR TIMES DAILY AS DIRECTED, 400 EA, 3 Refill(s).          Glucose tabs: See Instructions, use as needed for hypoglycemia, 100 EA.          Contour test strips and lancets: See Instructions, testing 4 times daily, 400 EA.          CONTOUR TEST STRIPS 100'S: See Instructions, TEST FOUR TIMES DAILY AS DIRECTED, 400 EA, 3 Refill(s).          Contour next test strips: See Instructions, testing 4x/ day - new start for insulin pump, 8 bottle(s), 3 Refill(s).          *Vitamin C: 1,000 mg, daily.          aspirin 81 mg oral tablet: 81 mg, 1 tab(s), PO, Daily, 30 tab(s).          *Cinnamon: 1,000 mg, po, daily.          clobetasol 0.05% topical cream: 1 varsha, TOP, BID, apply a thin film; apply to affected rash,  15 g.          *Flax Seed Oil: 0.          Glucagon Emergency Kit for Low Blood Sugar 1 mg injection: See Instructions, 1mg injection if pt unable to safely consume glucose, 1 EA, 0 Refill(s).          NovoLOG 100 units/mL subcutaneous solution: See Instructions, ~70u/ day use with insulin pump, 60 mL, 3 Refill(s).          levothyroxine 200 mcg (0.2 mg) oral tablet: 200 mcg, 1 tab(s), po, daily, 90 tab(s), 2 Refill(s).          *Multiple Vitamins oral capsule: 1 cap(s), PO, Daily.          *Fish Oil: 0.          pravastatin 20 mg oral tablet: 20 mg, 1 tab(s), PO, hs, 90 tab(s), 2 Refill(s).          *Metamucil: See Instructions, 1 tbsp daily.    _  Allergies:          atorvastatin  _  Problems/Diagnosis:          Hyperlipidemia          Hypothyroid          Diabetes mellitus type 1          Essential tremor          Intolerance of drug          Background diabetic retinopathy  _        Contact will be made with patient:  _ Monthly   _ Biyearly   _ Yearly   _ As patient needs   _ As provider needs

## 2022-02-15 NOTE — TELEPHONE ENCOUNTER
Entered by Litzy Del Toro LPN on November 09, 2021 10:28:25 AM CST  ---------------------  From: Litzy Del Toro LPN   To: [facility]    Sent: 11/9/2021 10:28:25 AM CST  Subject: Medication Management     ** Not Approved: Refill not appropriate, Rx sent 11/4 **  Miscellaneous Prescription (DEXCOM G6 TRANSMITTER TRANSMIT MISC)  CHANGE EVERY THREE MONTHS  Qty:  1 EA        Days Supply:  90        Refills:  3          Substitutions Allowed     Route To Pharmacy - [facility]   Signed by Litzy Del Toro LPN            ** Patient matched by Litzy Del Toro LPN on 11/9/2021 10:27:14 AM CST **      ------------------------------------------  From: [facility]  To: Kb Mcquene MD  Sent: November 3, 2021 10:03:59 AM CDT  Subject: Medication Management  Due: October 21, 2021 8:18:07 PM CDT     ** On Hold Pending Signature **     Drug: DEXCOM G6 TRANSMITTER TRANSMIT MISC, CHANGE EVERY THREE MONTHS  Quantity: 1 EA  Days Supply: 90  Refills: 3  Substitutions Allowed  Notes from Pharmacy:     Dispensed Drug: DEXCOM G6 TRANSMITTER TRANSMIT MISC, CHANGE EVERY THREE MONTHS  Quantity: 1 EA  Days Supply: 90  Refills: 3  Substitutions Allowed  Notes from Pharmacy:  ------------------------------------------

## 2022-02-15 NOTE — NURSING NOTE
Diabetes Eye Testing Entered On:  7/8/2020 4:41 PM CDT    Performed On:  7/2/2020 4:41 PM CDT by Malika Joya CMA               Diabetes Eye Testing   Retinopathy Present TR :   No   Dilated Retinal Exam Date TR :   7/2/2020 CDT   Malika Joya CMA - 7/8/2020 4:41 PM CDT

## 2022-02-15 NOTE — TELEPHONE ENCOUNTER
---------------------  From: Rosy Madden LPN   Sent: 4/12/2019 3:12:00 PM CDT  Subject: Novolog     patient called asking for a refill of NovoLog sent to [facility] in [place], medication was sent but LVM for patient to call back and set up a DM appt follow up.   last visit was 09/20/18

## 2022-02-15 NOTE — CARE COORDINATION
Pt appears on [facility] chronic disease panel as out of parameters for A1C.  Pt has RTC 1/24/2019, but pt is going south from 12/31/2018 to April 2019.  [facility] pt.

## 2022-02-15 NOTE — NURSING NOTE
Quick Intake Entered On:  3/31/2021 11:00 AM CDT    Performed On:  3/31/2021 11:00 AM CDT by Evy Orozco MD               Summary   Height Measured :   67 in(Converted to: 5 ft 7 in, 170.18 cm)    Height/Length Estimated :   73 cm(Converted to: 2 ft 5 in, 28.74 in)    Systolic Blood Pressure :   136 mmHg (HI)    Diastolic Blood Pressure :   72 mmHg   Mean Arterial Pressure :   93 mmHg   Evy Orozco MD - 3/31/2021 11:00 AM CDT

## 2022-02-15 NOTE — NURSING NOTE
Quick Intake Entered On:  7/9/2020 10:13 AM CDT    Performed On:  7/9/2020 10:12 AM CDT by Rupal White               Summary   Weight Measured :   186.6 lb(Converted to: 186 lb 10 oz, 84.64 kg)    Height Measured :   68.5 in(Converted to: 5 ft 8 in, 173.99 cm)    Body Mass Index :   27.96 kg/m2 (HI)    Body Surface Area :   2.02 m2   Weight Estimated :   129 kg(Converted to: 284 lb 6 oz, 284.396 lb)    Height/Length Estimated :   73 cm(Converted to: 2 ft 5 in, 28.74 in)    BSA Estimated :   1.73 m2   Rupal White - 7/9/2020 10:12 AM CDT

## 2022-02-15 NOTE — NURSING NOTE
Comprehensive Intake Entered On:  9/20/2019 9:22 AM CDT    Performed On:  9/20/2019 9:21 AM CDT by Malika Joya CMA               Summary   Weight Measured :   182 lb(Converted to: 182 lb 0 oz, 82.55 kg)    Systolic Blood Pressure :   142 mmHg (HI)    Diastolic Blood Pressure :   70 mmHg   Mean Arterial Pressure :   94 mmHg   Peripheral Pulse Rate :   60 bpm   BP Site :   Right arm   Temperature Tympanic :   96.6 DegF(Converted to: 35.9 DegC)  (LOW)    Malika Joya CMA - 9/20/2019 9:23 AM CDT   Chief Complaint :   f/u chronic   Malika Joya CMA - 9/20/2019 9:21 AM CDT   Health Status   Allergies Verified? :   Yes   Medication History Verified? :   Yes   Medical History Verified? :   Yes   Pre-Visit Planning Status :   Completed   Tobacco Use? :   Former smoker   Malika Joya CMA - 9/20/2019 9:23 AM CDT   Consents   Consent for Immunization Exchange :   Consent Granted   Consent for Immunizations to Providers :   Consent Granted   Malika Joya CMA - 9/20/2019 10:07 AM CDT   Social History   Social History   (As Of: 9/20/2019 9:25:58 AM CDT)   Alcohol:        1-2 times per month, 1 drinks/episode average.   (Last Updated: 11/25/2014 9:46:31 AM CST by Malika Joya CMA)          Tobacco:        Former smoker, quit more than 30 days ago, Cigarettes   (Last Updated: 9/20/2018 12:33:59 PM CDT by Malika Joya CMA)          Employment/School:        Retired, Work/School description: /, [employer].  Previous employment/school: [employer].   (Last Updated: 3/29/2013 11:08:49 AM CDT by Yoanna Porras)          Home/Environment:        Marital status: .  Spouse/Partner name: Vicenta.   (Last Updated: 3/29/2013 11:02:41 AM CDT by Yoanna Porras)          Exercise:        Exercise frequency: Daily.  Exercise type: Aerobics, Walking, Weight lifting.   (Last Updated: 12/20/2016 1:13:09 PM CST by Sonia Cleary)

## 2022-02-15 NOTE — CARE COORDINATION
Pt appears on [facility] chronic disease panel as out of parameters for A1C.  Pt has RTC 1/24/2019, but pt is going south from 12/31/2018 to April 2019.  [facility] pt.

## 2022-02-15 NOTE — LETTER
(Inserted Image. Unable to display)       [address] [place] [address]  August 20, 2021      ROSALIO ANDRADE      [address] [place] [address]        Dear ROSALIO,    Thank you for selecting [facility] for your healthcare needs.  Below you will find the results of your recent tests done at our clinic.    Diabetes improved.      Result Name Current Result Previous Result Reference Range   Sodium Level (mmol/L) ((L)) 134 8/19/2021  137 4/23/2021 135 - 146   Potassium Level (mmol/L)  4.5 8/19/2021  4.2 4/23/2021 3.5 - 5.3   Chloride Level (mmol/L)  101 8/19/2021  102 4/23/2021 98 - 110   CO2 Level (mmol/L)  25 8/19/2021  29 4/23/2021 20 - 32   Glucose Level (mg/dL) ((H)) 230 8/19/2021 ((H)) 159 4/23/2021 65 - 99   BUN (mg/dL)  15 8/19/2021  18 4/23/2021 7 - 25   Creatinine Level (mg/dL)  0.87 8/19/2021  0.94 4/23/2021 0.70 - 1.18   eGFR (mL/min/1.73m2)  84 8/19/2021  79 4/23/2021 > OR = 60 -    Calcium Level (mg/dL)  8.8 8/19/2021  9.4 4/23/2021 8.6 - 10.3   Hgb A1c ((H)) 7.8 8/19/2021 ((H)) 8.5 4/23/2021  - <5.7       Please contact me or my assistant at [phone] if you have any questions.     Sincerely,        Naresh Oliveira MD

## 2022-02-15 NOTE — TELEPHONE ENCOUNTER
---------------------  From: Anisha Bell RN (Phone Messages Pool ([id number]))   To: [employer] Message Pool ([id number]);     Sent: 10/14/2021 8:19:19 AM CDT  Subject: CONSUMER MESSAGE  FW: Baby Aspirin           ---------------------  From: ROSALIO ANDRADE  To: [facility]  Sent: 10/13/2021 06:01 p.m. CDT  Subject: Baby Aspirin  Dr. Mcqueen,  Do I need to continue taking Baby Aspirin?---------------------  From: Malika Joya CMA ([employer] Message Pool ([id number]))   To: Kb Mcqueen MD;     Sent: 10/14/2021 8:47:35 AM CDT  Subject: FW: CONSUMER MESSAGE  FW: Baby Aspirin---------------------  From: Kb Mcqueen MD   To: [employer] Message Pool ([id number]);     Sent: 10/14/2021 2:07:13 PM CDT  Subject: RE: CONSUMER MESSAGE  FW: Baby Aspirin     yes. Being diabetic places him in a high risk category.  More recent discussion about utility of ASA use pts >60 would be more appropriate for non-diabetic population.---------------------  From: Malika Joya CMA ([employer] Message Pool ([id number]))   To: ROSALIO ANDRADE    Sent: 10/14/2021 2:50:34 PM CDT  Subject: RE: CONSUMER MESSAGE  FW: Baby Aspirin     Merrill Gayle,  Please see Dr Mcqueen's response.    Malika

## 2022-02-15 NOTE — TELEPHONE ENCOUNTER
---------------------  From: Jyoti James CMA (Phone Messages Pool ([id number]_[facility]))   To: Kb Mcqueen MD;     Sent: 8/16/2021 3:20:10 PM CDT  Subject: FW: Rosalio Malloy's 2nd shoulder surgery.           ---------------------  From: ROSALIO MALLOY  To: [facility]  Sent: 08/16/2021 02:55 p.m. CDT  Subject: Rosalio Malloy s 2nd shoulder surgery.  Dear Malika Joya,  Unfortunately more shoulder surgery with Dr. Paul scheduled for 8-23 in . My question is will Dr. Mcqueen sign off on my pre-op requirement (I had one the end of April before my first surgery)? Let me know if you have any questions.  Thanks,/  Rosalio.  [phone].---------------------  From: Kb Mcqueen MD   To: Phone Messages Pool ([id number]_[place] [facility] [place]);     Sent: 8/16/2021 6:24:53 PM CDT  Subject: RE: Rosalio Malloy's 2nd shoulder surgery.     he will need to schedule pre-op appointment.  it's not that I don't want to sign off, but Medicare will require visit within 30 days of procedure.Call to pt at [phone]  Informed him he needs pre op  Trans to scheduling

## 2022-02-15 NOTE — PROGRESS NOTES
Patient:   ROSALIO ANDRADE            MRN: [id number]            FIN: [id number]               Age:   74 years     Sex:  Male     :  1945   Associated Diagnoses:   Diabetes mellitus type 1; Hyperlipidemia NOS; Overweight (BMI 25.0-29.9)   Author:   Rupal White      Visit Information   Visit type:  Diabetes Self Management Education .    Referral source:  Richar RIZZO, Kb.       Chief Complaint   Uncontrolled DM Type 1 (ROLANDO), Hyperlipidemia, Overweight       History of Present Illness   Discussed nutrition, diabetes, and lifestyle management with pt.  Pt is here today to download Medtronic 630G insulin pump.  Pt started it on 17.    Pt's comfort level with the pump is very happy he made the transition to the pump.  Hgb a1c is at 8.3% = 192 eAG increasing trend, goal <8%  Nutrition:  Pt is counting carbohydrates and using the bolus wizard as directed.  Working on trying to bolus 15 min prior to eating   Pt's carb intake has improved but admits to snacking and not consistently bolusing - pt aware carb intake significantly affects glycemic fluctuations and variability     Physical Activity:  1 hour daily variety ( walk, weights etc), goes to [place] over the winter months and walks 3 miles daily, swimming at least 3 days/ week   Stress:   moderate  Sleep: good  Support: wife  Insulin:  Novolog via pump ~40.34u/ day   BG Testing: ~ 4.1x/ day not testing at evening meal   Routine diabetes care:  up to date with eye, dental, and foot exams - no concerns      BG avg 18 +/- 120 mg/dL (significant variability  Avg Daily Basal 15.59 (39%)  Avg Daily Bolus 24.75(61%)  13% (7 readings below 70mg/dL) - improved     ICR=   12:00     7  15:00     9 changed to 8    Sensitivity=  0:00  45  6:30  42   20:30  45      Target     120 - 120     Basal:  15.600  16.05  MN   0.525  0.575  3am   0.650  0.575  6:30am  0.750  0.750   13:00  06.25  0.675      Insulin action  3.5 hours  Max Bolus    17u  Max Basal   1u/ hr       Health  Status   Allergies:    Nonallergic Reactions (Selected)  Severity Not Documented  Atorvastatin (Joint pain)   Medications:  (Selected)   Prescriptions  Prescribed  Contour next test strips: Contour next test strips, See Instructions, Instructions: testing 4x/ day - new start for insulin pump-E10.9, Supply, # 400 EA, 3 Refill(s), Type: Maintenance, Pharmacy: [facility], keep on file and pt will notify when needed, testing 4x/ day...  Contour test strips and lancets: Contour test strips and lancets, See Instructions, Instructions: testing 4 times daily, # 400 EA, 11 Refill(s), Type: Soft Stop, Pharmacy: [email or web address]  Glucagon Emergency Kit for Low Blood Sugar 1 mg injection: See Instructions, Instructions: 1mg injection if pt unable to safely consume glucose, # 1 EA, 0 Refill(s), Type: Maintenance, Pharmacy: [facility], keep on hold, 1mg injection if pt unable to safely consume glucose  Glucose tabs: Glucose tabs, See Instructions, Instructions: use as needed for hypoglycemia, # 100 EA, 3 Refill(s), Type: Maintenance, Pharmacy: [email or web address]  NovoLOG 100 units/mL injectable solution: See Instructions, Instructions: 70 units/day use with insulin pump     DX  E10.9, # 90 mL, 3 Refill(s), Type: Maintenance, Pharmacy: [facility], keep on file and pt will notify when needed, 70 units/day use with insulin pump     DX  E10.9  ULTRA-FINE III SHORT PEN NEEDL MG INJECTABLE: ULTRA-FINE III SHORT PEN NEEDL MG INJECTABLE, See Instructions, Instructions: injects 4 times daily, # 400 EA, 11 Refill(s), Pharmacy: [email or web address]  aspirin 81 mg oral tablet: 1 tab(s) ( 81 mg ), PO, Daily, # 30 tab(s), 0 Refill(s), Type: Maintenance, OTC (Rx)  levothyroxine 200 mcg (0.2 mg) oral tablet: = 1 tab(s) ( 200 mcg ), po, daily, # 90 tab(s), 3 Refill(s), Type: Maintenance, Pharmacy: [facility], keep on hold and pt will notify when needed, 1 tab(s) Oral daily  pravastatin 20 mg oral  "tablet: = 1 tab(s) ( 20 mg ), PO, hs, # 90 tab(s), 3 Refill(s), Type: Maintenance, Pharmacy: [id number], keep on file and pt will notify when needed, 1 tab(s) Oral hs   Problem list:    All Problems  Intolerance of drug / SNOMED CT 80541637 / Confirmed  Essential tremor / SNOMED CT 4438833882 / Confirmed  Hyperlipidemia / SNOMED CT 02030740 / Confirmed  Hypothyroid / SNOMED CT 14085623 / Confirmed  Insulin pump in place / SNOMED CT 9618034174 / Confirmed  Diabetes mellitus type 1 / SNOMED CT 512660248 / Confirmed  Canceled: Long term (current) use of insulin / SNOMED CT 254032142      Histories   Past Medical History:    Active  Hyperlipidemia (28827920)  Hypothyroid (61915336)  Comments:  2010 CST 1:40 PM Kassi Gonzalez CMA  2007  Diabetes mellitus type 1 (224967053)  Comments:  2010 CST 1:41 PM Kassi Gonzalez CMA  2007  Essential tremor (1911493871)   Family History:    Diabetes mellitus type I  Father ()  Bowel obstruction  Father ()  [place] - Cancer of uterus  Mother ()     Procedure history:    Screening for colon cancer (SNOMED CT 0963385007) performed by Kb Mcqueen MD on 2019 at 74 Years.  Comments:  6/3/2019 4:46 PM CDT - Yoanna Danielson MA result:  positive  Recommendation:  needs colonoscopy  Screening for malignant neoplasm of colon (SNOMED CT 9370090086) on 2015 at 70 Years.  Comments:  2016 8:15 AM GARCIA - Adali Pantoja is negative  Sialogram (SNOMED CT 957281141) on 2006 at 60 Years.  Comments:  3/29/2013 10:58 AM CDT - Yoanna Porras  \"Stone.  Swollen gland.\"  removal of stone, left mandibular gland in the month of 2005 at 60 Years.  Sigmoidoscopy (SNOMED CT 79300539) performed by Sky Bui MD on 10/17/2002 at 57 Years.   Social History:        Alcohol Assessment            1-2 times per month, 1 drinks/episode average.      Tobacco Assessment            Former smoker, quit more than 30 days " ago, Cigarettes      Employment and Education Assessment            Retired, Work/School description: /, [employer].  Previous               employment/school: [employer].      Home and Environment Assessment            Marital status: .  Spouse/Partner name: Vicenta.      Exercise and Physical Activity Assessment            Exercise frequency: Daily.  Exercise type: Aerobics, Walking, Weight lifting.      Physical Examination   Measurements from flowsheet : Measurements   7/8/2019 8:59 AM CDT Height Measured - Standard 68.5 in    Weight Measured - Standard 177.2 lb    BSA 1.97 m2    Body Mass Index 26.55 kg/m2  HI         Health Maintenance      Recommendations     Pending (in the next year)        OverDue           Tetanus Vaccine due  07/26/12  and every 10  year(s)           DM - Communication with Managing Provider due  05/29/16  and every 1  year(s)           DM - Foot Exam due  05/29/16  and every 1  year(s)           Colorectal Cancer Screen (Colonoscopy) (Male) due  12/07/18  and every 3  year(s)           Colorectal Cancer Screen (Occult Blood) (Male) due  12/07/18  and every 3  year(s)           Alcohol Misuse Screen (Male) due  05/15/19  and every 1  year(s)        Due            Fall Risk Screen (Male) due  07/08/19  and every 1  year(s)           Hepatitis C Screen 3330-1754 (Male) due  07/08/19  One-time only           Lung Cancer Screen (Male) due  07/08/19  and every 1  year(s)        Near Due            DM - HgbA1c near due  07/26/19  and every 3  month(s)           Influenza Vaccine near due  09/01/19  and every 1  year(s)        Refused            Zoster/Shingles Vaccine due  07/08/19  One-time only        Due In Future            Aspirin Therapy for Prevention of CVD (Male) not due until  11/25/19  and every 5  year(s)           DM - Microalbumin not due until  04/26/20  and every 1  year(s)           DM - Eye Exam not due until   04/29/20  and every 1  year(s)           High Blood Pressure Screen (Male) not due until  05/03/20  and every 1  year(s)           Tobacco Use Screen (Male) not due until  05/03/20  and every 1  year(s)           Depression Screen (Male) not due until  05/03/20  and every 1  year(s)     Satisfied (in the past 1 year)        Satisfied            Body Mass Index Check (Male) on  07/08/19.           Body Mass Index Check (Male) on  06/03/19.           DM - Eye Exam on  04/29/19.           DM - HgbA1c on  04/26/19.           DM - HgbA1c on  09/14/18.           DM - Microalbumin on  04/26/19.           DM - Microalbumin on  04/26/19.           Depression Screen (Male) on  05/03/19.           Depression Screen (Male) on  05/03/19.           Depression Screen (Male) on  05/03/19.           High Blood Pressure Screen (Male) on  05/03/19.           High Blood Pressure Screen (Male) on  09/20/18.           Influenza Vaccine on  09/20/18.           Lipid Disorders Screen (Male) on  09/14/18.           Lipid Disorders Screen (Male) on  09/14/18.           Lipid Disorders Screen (Male) on  09/14/18.           Lipid Disorders Screen (Male) on  09/14/18.           Pneumococcal Vaccine on  09/20/18.           Tobacco Use Screen (Male) on  05/03/19.           Tobacco Use Screen (Male) on  09/20/18.        Review / Management   Results review:  Lab results   4/26/2019 7:57 AM CDT Sodium Level 135 mmol/L    Potassium Level 4.4 mmol/L    Chloride Level 100 mmol/L    CO2 Level 26 mmol/L    Glucose Level 209 mg/dL  HI    BUN 18 mg/dL    Creatinine 0.90 mg/dL    BUN/Creat Ratio NOT APPLICABLE    eGFR 84 mL/min/1.73m2    eGFR African American 98 mL/min/1.73m2    Calcium Level 9.3 mg/dL    Hgb A1c 8.3  HI    U Microalbumin 0.3 mg/dL    Ur Creatinine 152 mg/dL    Ur Microalb/Creat Ratio 2   .       Impression and Plan   Diagnosis     Diabetes mellitus type 1 (AOC96-HW E10.9).     Hyperlipidemia NOS (VRJ98-ZA E11.69).     Overweight (BMI  25.0-29.9) (RUV30-HT E66.3).       Counseled: Patient, Further instruction on AADE7 Self Care Behaviors  Discussed living well with diabetes, further information about diabetes disease process, reviewed chronic condition and appropriate treatment and self management  Healthy Eating - Discussed following a carb controlled plan with more balanced meals instead of carb based.  ie this am fruit, banana, cereal, milk BG >300 in office due to not bolusing early and high carb meal.  Review carbohydrate counting, reading food labels, appropriate portion sizes, diabetic plate method as a general rule.  Patient is provided with additional magazines with recipes, meal planning ideas to incorporate dietary recommendations, meal planning and preparation  Education review on decreasing cardiovascular risk with following Therapeutic Lifestyle Changes (TLC) nutrition plan for heart healthy eating.    Being Active - Education on how exercise helps with : pt states low BG happen due to exercise - went through using temporary basal again with pt and strongly encouraged using it daily for exercise   - lowering BG by increasing the muscles ability to take up and use glucose   - weight loss   - healthier heart (improve lipid profile)   - improve sleep, mood, energy   - decrease stress      Monitoring - Reviewed recommended testing schedule and blood glucose target levels.  Test prior to every meal.    Taking Medication - on NovoLog   Problem Solving -  adjusted pump settings   Reviewed Pump settings (basal/bolus), menu options and use, bolus wizard suspend, temp basal BG testing recommendations, hypo/hyperglycemia guidelines, infusion set changes and fill and DKA prevention.  medical support team assistance, resources provided (written); good control of stress, pump adjustments made today slightly but elevated reading and low readings likely related to user recommended routine changes.  Bolus early 14 - 20+ min prior to meals, test BG  prior to ALL meals, bolus for ALL snacks >5gm CHO, use temp basal for exercise  Healthy Coping - support of friends, family, and medical support team   Reducing Risks - Detailed education on potential complications associated with uncontrolled diabetes and prevention recommendations.  Recommendations include: annual eye exam, good oral cares with brushing BID and flossing daily, routine dental apts. good foot care tips and shoe wear - discussed monofilament test yearly.  Importance of adequate sleep and good control of BG to prevent developing complications related to uncontrolled DM including nephropathy, neuropathy, retinopathy, and cardiovascular disease.  Weight loss goal of 7 % of current body weight pounds as a reasonable goal to help increase insulin sensitivity      Pt able to verbalize understanding of above education, handouts provided for additional resources.       Goals:   1.  Practice healthy stress management and get good quality sleep with the goal of 7-8 hours per night.    2.   Physical activity: Recommend 30 min of physical activity on 5 or more days/ week.    3.  Eat in a healthy way, per diabetic plate method.  Nutrition reference: Eat 3 meals a day; snacks are optional.  A meal is three or more food groups; make it colorful for better nutrition.    4. Count carbohydrates and use bolus wizard for all meals and snacks bolus 15-20min prior to eating   5.  BG testing4+x/ day  Goal pre-meals 75 - 120; 2 hrs after meals 75 - 140   6.  Be aware of s/sx of hypo/ hyperglycemia and follow treatment protocol   7.  Always have back up pump supplies on hand and vial/ syringe with battery and glucose tablets   8.  follow up in 3 month       Professional Services   Time spent with pt 30 min   cc Dr. Kb Mcqueen

## 2022-02-15 NOTE — NURSING NOTE
Comprehensive Intake Entered On:  11/1/2021 11:44 AM CDT    Performed On:  11/1/2021 11:40 AM CDT by Patricia Aceves CMA               Summary   Chief Complaint :   Rash x5 days  on right side, chest and back, Decreased appetite and elevated BP    Weight Measured :   186 lb(Converted to: 186 lb 0 oz, 84.368 kg)    Height Measured :   67 in(Converted to: 5 ft 7 in, 170.18 cm)    Body Mass Index :   29.13 kg/m2 (HI)    Body Surface Area :   2 m2   Height/Length Estimated :   73 cm(Converted to: 2 ft 5 in, 28.74 in)    Systolic Blood Pressure :   160 mmHg (HI)    Diastolic Blood Pressure :   92 mmHg (HI)    Mean Arterial Pressure :   115 mmHg   Peripheral Pulse Rate :   68 bpm   BP Site :   Right arm   Pulse Site :   Radial artery   BP Method :   Electronic   HR Method :   Electronic   Temperature Tympanic :   97.2 DegF(Converted to: 36.2 DegC)  (LOW)    Oxygen Saturation :   97 %   Patricia Aceves CMA - 11/1/2021 11:40 AM CDT   Health Status   Allergies Verified? :   Yes   Medication History Verified? :   Yes   Medical History Verified? :   Yes   Pre-Visit Planning Status :   Completed   Tobacco Use? :   Never smoker   Patricia Aceves CMA - 11/1/2021 11:40 AM CDT   Meds / Allergies   (As Of: 11/1/2021 11:44:07 AM CDT)   Allergies (Active)   No known allergies  Estimated Onset Date:   Unspecified ; Created By:   Trini Michaels CMA; Reaction Status:   Active ; Category:   Drug ; Substance:   No known allergies ; Type:   Allergy ; Updated By:   Trini Michaels CMA; Reviewed Date:   11/1/2021 11:41 AM CDT        Medication List   (As Of: 11/1/2021 11:44:07 AM CDT)   Prescription/Discharge Order    aspirin  :   aspirin ; Status:   Prescribed ; Ordered As Mnemonic:   aspirin 81 mg oral tablet ; Simple Display Line:   81 mg, 1 tab(s), PO, Daily, 30 tab(s) ; Ordering Provider:   Zachery Koehler MD; Catalog Code:   aspirin ; Order Dt/Tm:   3/2/2012 11:59:54 AM CST          insulin aspart  :   insulin aspart ; Status:   Prescribed ;  Ordered As Mnemonic:   NovoLOG 100 units/mL injectable solution ; Simple Display Line:   See Instructions, INJECT UP TO 70 UNITS DAILY VIA PUMP-  Dx E10.9, 90 mL, 3 Refill(s) ; Ordering Provider:   Kb Mcqueen MD; Catalog Code:   insulin aspart ; Order Dt/Tm:   12/1/2020 9:43:57 AM CST          levothyroxine  :   levothyroxine ; Status:   Prescribed ; Ordered As Mnemonic:   levothyroxine 175 mcg (0.175 mg) oral tablet ; Simple Display Line:   1 tab(s), Oral, daily, 30 tab(s), 0 Refill(s) ; Ordering Provider:   Kb Mcqueen MD; Catalog Code:   levothyroxine ; Order Dt/Tm:   9/23/2021 1:49:19 PM CDT          Miscellaneous Prescription  :   Miscellaneous Prescription ; Status:   Prescribed ; Ordered As Mnemonic:   CONTOUR NEXT TEST STRIP ; Simple Display Line:   See Instructions, TEST 4 TIMES A DAY, 400 unknown unit, 3 Refill(s) ; Ordering Provider:   Kb Mcqueen MD; Catalog Code:   Miscellaneous Prescription ; Order Dt/Tm:   9/17/2019 11:41:58 AM CDT          Miscellaneous Prescription  :   Miscellaneous Prescription ; Status:   Prescribed ; Ordered As Mnemonic:   CONTOUR NEXT TEST STRIPS ; Simple Display Line:   See Instructions, pt testing 4 times daily- pt has a pump and needing to test more often due to fluctuating BS's - Dx - Z96.41 and E10.9, 400 EA, 3 Refill(s) ; Ordering Provider:   Kb Mcqueen MD; Catalog Code:   Miscellaneous Prescription ; Order Dt/Tm:   9/26/2019 11:48:12 AM CDT          Miscellaneous Prescription  :   Miscellaneous Prescription ; Status:   Prescribed ; Ordered As Mnemonic:   ULTRA-FINE III SHORT PEN NEEDL MG INJECTABLE ; Simple Display Line:   See Instructions, injects 4 times daily, 400 EA ; Ordering Provider:   Zachery Koehler MD; Catalog Code:   Miscellaneous Prescription ; Order Dt/Tm:   6/5/2012 4:48:13 PM CDT          Miscellaneous Rx Supply  :   Miscellaneous Rx Supply ; Status:   Prescribed ; Ordered As Mnemonic:   Dexcom G6  ; Simple Display Line:   See Instructions, use  to see glucose readings, 1 EA, 1 Refill(s) ; Ordering Provider:   Kb Mcqueen MD; Catalog Code:   Miscellaneous Rx Supply ; Order Dt/Tm:   5/17/2021 3:09:46 PM CDT          Miscellaneous Rx Supply  :   Miscellaneous Rx Supply ; Status:   Prescribed ; Ordered As Mnemonic:   Dexcom G6 sensors ; Simple Display Line:   See Instructions, change sensor every 10 days  E10.9, 9 EA, 11 Refill(s) ; Ordering Provider:   Kb Mcqueen MD; Catalog Code:   Miscellaneous Rx Supply ; Order Dt/Tm:   5/17/2021 3:09:45 PM CDT          Miscellaneous Rx Supply  :   Miscellaneous Rx Supply ; Status:   Prescribed ; Ordered As Mnemonic:   Dexcom G6 transmitter ; Simple Display Line:   See Instructions, change every 3 months, 1 EA, 3 Refill(s) ; Ordering Provider:   Kb Mcqueen MD; Catalog Code:   Miscellaneous Rx Supply ; Order Dt/Tm:   5/17/2021 3:09:46 PM CDT          Miscellaneous Rx Supply  :   Miscellaneous Rx Supply ; Status:   Prescribed ; Ordered As Mnemonic:   Glucose tabs ; Simple Display Line:   See Instructions, use as needed for hypoglycemia, 100 EA ; Ordering Provider:   Zachery Koehler MD; Catalog Code:   Miscellaneous Rx Supply ; Order Dt/Tm:   3/29/2011 9:39:13 AM CDT          pravastatin  :   pravastatin ; Status:   Prescribed ; Ordered As Mnemonic:   pravastatin 20 mg oral tablet ; Simple Display Line:   1 tab(s), Oral, qhs, 30 tab(s), 0 Refill(s) ; Ordering Provider:   Kb Mcqueen MD; Catalog Code:   pravastatin ; Order Dt/Tm:   9/23/2021 1:49:54 PM CDT            Home Meds    psyllium  :   psyllium ; Status:   Documented ; Ordered As Mnemonic:   Metamucil ; Simple Display Line:   Oral, 0 Refill(s) ; Catalog Code:   psyllium ; Order Dt/Tm:   5/19/2020 8:11:08 AM CDT            ID Risk Screen   Recent Travel History :   No recent travel   Family Member Travel History :   No recent travel   Other Exposure to Infectious Disease :   Unknown   COVID-19 Testing Status :   No positive COVID-19 test   AndPatricia cancino CMA  11/1/2021 11:40 AM CDT   Social History   Social History   (As Of: 11/1/2021 11:44:07 AM CDT)   Alcohol:        1-2 times per month, 1 drinks/episode average.   (Last Updated: 11/25/2014 9:46:31 AM CST by Malika Joya CMA)          Tobacco:        Former smoker, quit more than 30 days ago, Cigarettes   (Last Updated: 12/1/2020 8:35:13 AM CST by Malika Joya CMA)          Electronic Cigarette/Vaping:        Electronic Cigarette Use: Never.   (Last Updated: 12/1/2020 8:35:17 AM CST by Malika Joya CMA)          Employment/School:        Retired, Work/School description: /, [employer].  Previous employment/school: [employer].   (Last Updated: 3/29/2013 11:08:49 AM CDT by Yoanna Porras)          Home/Environment:        Marital status: .  Spouse/Partner name: Vicenta.   (Last Updated: 3/29/2013 11:02:41 AM CDT by Yoanna Porras)          Exercise:        Exercise frequency: Daily.  Exercise type: Aerobics, Walking, Weight lifting.   (Last Updated: 12/20/2016 1:13:09 PM CST by Sonia Cleary)

## 2022-02-15 NOTE — TELEPHONE ENCOUNTER
Entered by Malika Joya CMA on December 18, 2019 12:29:36 PM CST  ---------------------  From: Malika Joya CMA   To: [facility]    Sent: 12/18/2019 12:29:36 PM CST  Subject: Medication Management     ** Submitted: **  Order:pravastatin (pravastatin 20 mg oral tablet)  1 tab(s)  Oral  daily  Qty:  90 tab(s)        Refills:  0          Substitutions Allowed     Route To Pharmacy - [facility]    Signed by Malika Joya CMA  12/18/2019 12:29:00 PM    ** Submitted: **  Complete:pravastatin (pravastatin 20 mg oral tablet)   Signed by Malika Joya CMA  12/18/2019 12:29:00 PM    ** Not Approved:  **  pravastatin (PRAVASTATIN 20MG TAB TABLET)  TAKE 1 TABLET BY MOUTH ONCE DAILY NEED NEW SCRIPT  Qty:  1 unknown unit        Days Supply:  0        Refills:  0          Substitutions Allowed     Route To Pharmacy - [facility]   Note from Pharmacy:  PLEASE SEND NEW SCRIPT  Signed by Malika Joya CMA          due for DM check in March  last seen 9/2019 and DS 12/2019        ** Patient matched by Malika Joya CMA on 12/18/2019 12:27:48 PM CST **      ------------------------------------------  From: [facility]  To: Kb Mcqueen MD  Sent: December 18, 2019 9:25:38 AM CST  Subject: Medication Management  Due: December 19, 2019 9:25:38 AM CST    ** On Hold Pending Signature **  Drug: pravastatin (pravastatin 20 mg oral tablet)  TAKE 1 TABLET BY MOUTH ONCE DAILY NEED NEW SCRIPT  Quantity: 1 unknown unit  Days Supply: 0  Refills: 0  Substitutions Allowed  Notes from Pharmacy: PLEASE SEND NEW SCRIPT    Dispensed Drug: pravastatin (pravastatin 20 mg oral tablet)  TAKE 1 TABLET BY MOUTH ONCE DAILY NEED NEW SCRIPT  Quantity: 1 unknown unit  Days Supply: 0  Refills: 0  Substitutions Allowed  Notes from Pharmacy: PLEASE SEND NEW SCRIPT  ------------------------------------------

## 2022-02-15 NOTE — TELEPHONE ENCOUNTER
---------------------  From: Bianca Gordon CMA (Phone Messages Pool ([id number]_[facility]))   To: Rupal White;     Sent: 9/14/2020 12:40:04 PM CDT  Subject: FW: Blood sugar           ---------------------  From: ROSALIO ANDRADE  To: [facility]  Sent: 09/14/2020 12:34 p.m. CDT  Subject: Blood sugar  Hi Rupal White,  Please call me.  Cell - [phone].  [email or web address]

## 2022-02-15 NOTE — NURSING NOTE
Depression Screening Entered On:  3/31/2021 11:42 AM CDT    Performed On:  3/31/2021 11:42 AM CDT by Trini Michaels CMA               Depression Screening   Little Interest - Pleasure in Activities :   Not at all   Feeling Down, Depressed, Hopeless :   Not at all   Initial Depression Screen Score :   0 Score   Poor Appetite or Overeating :   Not at all   Trouble Falling or Staying Asleep :   Not at all   Feeling Tired or Little Energy :   Not at all   Feeling Bad About Yourself :   Not at all   Trouble Concentrating :   Not at all   Moving or Speaking Slowly :   Not at all   Thoughts Better Off Dead or Hurting Self :   Not at all   Difficulty at Work, Home, Getting Along :   Not difficult at all   Detailed Depression Screen Score :   0    Total Depression Screen Score :   0    Trini Michaels CMA - 3/31/2021 11:42 AM CDT

## 2022-02-15 NOTE — TELEPHONE ENCOUNTER
---------------------  From: Lyla Stringer RN (Phone Messages Pool ([id number]_[facility]))   To: MELINDA Message Pool (32224_[place]);     Sent: 8/16/2021 5:09:23 PM CDT  Subject: physical?       PCP:  MELINDA  Time of Call: 4:42pm       Person Calling:  pt  Phone number:  [phone]    Note: VM received from pt, stating he is scheduled for surgery on 8/23 and inquired if his Pre-op physical from 4/23/21 could count as a current pre-op, or will he need to have another one?    Please advise    Last office visit and reason: 6/2/21 wrist pain  BRMpt contacted at 0936    Rotator cuff repair- did schedule a preop with JACQUI for urs

## 2022-02-15 NOTE — PROGRESS NOTES
Patient:   ROSALIO ANDRADE            MRN: [id number]            FIN: [id number]               Age:   75 years     Sex:  Male     :  1945   Associated Diagnoses:   Diabetes mellitus type 1; Hypothyroid; Insulin pump in place   Author:   Kb Mcqueen MD      Visit Information      Date of Service: 2020 07:53 am  Performing Location: [facility]  Encounter#: [id number]      Primary Care Provider (PCP):  Kb Mcqueen MD    NPI# [id number]      Referring Provider:  Kb Mcqueen MD    NPI# [id number]   Visit type:  Telephone Encounter.    Source of history:  Patient.    Location of patient:  Home  Call Start Time:   825  Call End Time:          Chief Complaint   2020 8:11 AM CDT    f/u chronic - verbal consent given for video visit     _      History of Present Illness   Today's visit was conducted via telephone due to the COVID-19 pandemic. Patient's consent to telephone visit was obtained and documented.      Reason for visit:    I spoke with Rosalio via telephone conference for follow-up related to his chronic conditions.  He did return from [place].  Unfortunately does not have his regular access to a swimming.  Still has stayed active with a home gym in his house.  Did see Keshia White this past week.  Adjustments made in reducing his prandial insulin related to some postprandial hypoglycemic episodes primarily in the evening.  He did have his basal levels increased as well.  He has seen his average blood sugars reduced down from 190s to 170s since these changes.         Review of Systems   Constitutional:  No fever, No chills.    Respiratory:  No shortness of breath, No cough.    Cardiovascular:  No chest pain.       Impression and Plan   Diagnosis     Diabetes mellitus type 1 (PHZ88-YW E10.9).     Hypothyroid (RHI45-UO E03.9).     Insulin pump in place (SOJ82-WT Z96.41).        Health Status   Allergies:    Nonallergic Reactions (Selected)  Severity Not Documented  Atorvastatin (Joint  pain)   Medications:  (Selected)   Prescriptions  Prescribed  CONTOUR NEXT TEST STRIP: See Instructions, Instructions: TEST 4 TIMES A DAY, # 400 unknown unit, 3 Refill(s), Type: Maintenance, Pharmacy: [id number], TEST 4 TIMES A DAY  CONTOUR NEXT TEST STRIPS: CONTOUR NEXT TEST STRIPS, See Instructions, Instructions: pt testing 4 times daily- pt has a pump and needing to test more often due to fluctuating BS's - Dx - Z96.41 and E10.9, Supply, # 400 EA, 3 Refill(s), Type: Maintenance, Pharmacy: [facility],...  Glucagon Emergency Kit for Low Blood Sugar 1 mg injection: See Instructions, Instructions: 1mg injection if pt unable to safely consume glucose, # 1 EA, 0 Refill(s), Type: Maintenance, Pharmacy: [facility], keep on hold, 1mg injection if pt unable to safely consume glucose  Glucose tabs: Glucose tabs, See Instructions, Instructions: use as needed for hypoglycemia, # 100 EA, 3 Refill(s), Type: Maintenance, Pharmacy: [facility]  NovoLOG 100 units/mL injectable solution: See Instructions, Instructions: INJECT UP TO 70 UNITS DAILY VIA PUMP-  Dx E10.9, # 90 mL, 3 Refill(s), Type: Maintenance, Pharmacy: [facility], INJECT UP TO 70 UNITS DAILY VIA PUMP-  Dx E10.9  ULTRA-FINE III SHORT PEN NEEDL MG INJECTABLE: ULTRA-FINE III SHORT PEN NEEDL MG INJECTABLE, See Instructions, Instructions: injects 4 times daily, # 400 EA, 11 Refill(s), Pharmacy: [facility] [id number]  aspirin 81 mg oral tablet: 1 tab(s) ( 81 mg ), PO, Daily, # 30 tab(s), 0 Refill(s), Type: Maintenance, OTC (Rx)  levothyroxine 175 mcg (0.175 mg) oral tablet: = 1 tab(s) ( 175 mcg ), Oral, daily, # 90 tab(s), 1 Refill(s), Type: Maintenance, Pharmacy: [facility], 1 tab(s) Oral daily  pravastatin 20 mg oral tablet: = 1 tab(s) ( 20 mg ), Oral, daily, # 90 tab(s), 1 Refill(s), Type: Maintenance, Pharmacy: [facility], 1 tab(s) Oral daily  Documented Medications  Documented  Metamucil: Oral, 0 Refill(s), Type: Maintenance,     Medications          *denotes recorded medication          ULTRA-FINE III SHORT PEN NEEDL MG INJECTABLE: See Instructions, injects 4 times daily, 400 EA.          CONTOUR NEXT TEST STRIP: See Instructions, TEST 4 TIMES A DAY, 400 unknown unit, 3 Refill(s).          CONTOUR NEXT TEST STRIPS: See Instructions, pt testing 4 times daily- pt has a pump and needing to test more often due to fluctuating BS's - Dx - Z96.41 and E10.9, 400 EA, 3 Refill(s).          Glucose tabs: See Instructions, use as needed for hypoglycemia, 100 EA.          aspirin 81 mg oral tablet: 81 mg, 1 tab(s), PO, Daily, 30 tab(s).          Glucagon Emergency Kit for Low Blood Sugar 1 mg injection: See Instructions, 1mg injection if pt unable to safely consume glucose, 1 EA, 0 Refill(s).          NovoLOG 100 units/mL injectable solution: See Instructions, INJECT UP TO 70 UNITS DAILY VIA PUMP-  Dx E10.9, 90 mL, 3 Refill(s).          levothyroxine 175 mcg (0.175 mg) oral tablet: 175 mcg, 1 tab(s), Oral, daily, 90 tab(s), 1 Refill(s).          pravastatin 20 mg oral tablet: 20 mg, 1 tab(s), Oral, daily, 90 tab(s), 1 Refill(s).          *Metamucil: Oral, 0 Refill(s).       Problem list:    All Problems  Intolerance of drug / SNOMED CT 04078969 / Confirmed  Essential tremor / SNOMED CT 0352219346 / Confirmed  Hyperlipidemia / SNOMED CT 43382299 / Confirmed  Hypothyroid / SNOMED CT 77880360 / Confirmed  Insulin pump in place / SNOMED CT 8892923362 / Confirmed  Diabetes mellitus type 1 / SNOMED CT 783704838 / Confirmed      Histories   Past Medical History:    Active  Hyperlipidemia (86281090)  Hypothyroid (79096272)  Comments:  2010 CST 1:40 PM Kassi Gonzalez CMA    Diabetes mellitus type 1 (080209092)  Comments:  2010 CST 1:41 PM Kassi Gonzalez CMA  2007  Essential tremor (0333096716)   Family History:    Diabetes mellitus type I  Father ()  Bowel obstruction  Father ()  CA - Cancer of uterus  Mother  "()     Procedure history:    Colonoscopy (483922646) on 2019 at 74 Years.  Comments:  2020 8:52 AM CDT - Marianna Villanueva  Indication: Positive Cologuard.             Sedation: MAC.    2019 9:23 AM CDT - Mhanaz Malika MAHER  Tubular adenoma - negative for high grade dysplasia - Repeat in 3years  Screening for colon cancer (7853795274) on 2019 at 74 Years.  Comments:  6/3/2019 4:46 PM CDT - Yoanna Danielson MA  Cologuard result:  positive  Recommendation:  needs colonoscopy  Screening for malignant neoplasm of colon (2054184324) on 2015 at 70 Years.  Comments:  2016 8:15 AM CST - Adali Pantoja is negative  Sialogram (943846568) on 2006 at 60 Years.  Comments:  3/29/2013 10:58 AM CDT - Yoanna Porras  \"Stone.  Swollen gland.\"  removal of stone, left mandibular gland in the month of 2005 at 60 Years.  Sigmoidoscopy (65151571) on 10/17/2002 at 57 Years.  Insulin pump, device (9963582952).   Social History:        Alcohol Assessment            1-2 times per month, 1 drinks/episode average.      Tobacco Assessment            Former smoker, quit more than 30 days ago, Cigarettes      Employment and Education Assessment            Retired, Work/School description: /, [employer].  Previous               employment/school: [employer].      Home and Environment Assessment            Marital status: .  Spouse/Partner name: Vicenta.      Exercise and Physical Activity Assessment            Exercise frequency: Daily.  Exercise type: Aerobics, Walking, Weight lifting.        Review / Management     .) type I DM, uncontrolled, late onset diabetes in adulthood (ROLANDO)  hypoglycemic medications:  insulin therapy:  Medtronic pump (initiated in '17)  ICR=   12:00     7.5   15:00     7.5     Sensitivity=  0:00  45  6:30  42   20:30  45      Target     120 - 120     Basal:  16.588  increased to 17.175  MN   0.575  " 0.600  3am   0.575  0.600  6:30am  0.750  0.775  13:00  0.725  0.750      Insulin action  3.5 hours  Max Bolus    17u  Max Basal   1u/ hr    last HbA1c: 8.4%   microvascular complications: none  macrovascular complications: none  ASA/SIMON/statin:  ASA 81mg daily, pravastatin 20mg qhs    .) hypothyroidism   - low TSH on multiple occasions with 200mcg   - will reduce down to 175mcg daily; recheck TSH at next visit

## 2022-02-15 NOTE — LETTER
(Inserted Image. Unable to display)   October 27, 2021    ROSALIO LUPE  [address] [place] [address]            Dear ROSALIO,      Thank you for selecting [facility] for your healthcare needs.    Our records indicate you are due for the following services:    Diabetic Exam ~ Please bring your glucose meter and/or your blood glucose diary to your appointment.    Fasting Lab Tests ~ Please do not eat or drink anything 10 hours prior to your scheduled appointment time.  (Water and any medications that you may need are allowed unless directed otherwise.)    If you had your labs done at another facility or with Direct Access Lab Testing at [facility], please bring in a copy of the results to your next visit, mail a copy, or drop off a copy of your results to your Healthcare Provider.    (FYI   Regarding office visits: In some instances, a video visit or telephone visit may be offered as an option.)    You are due for lab work and an office visit; please schedule the lab appointment 1 week before the office visit.  This will assure all results are available to discuss with your Healthcare Provider during your visit.    **It is very helpful if you bring your medication bottles to your appointment.  This assures we have all of your current medications, including strength and dosing information, documented accurately in your medical record.    To schedule an appointment or if you have further questions, please contact your clinic at [phone].      Powered by [employer]    Sincerely,    Kb Mcqueen MD

## 2022-02-15 NOTE — TELEPHONE ENCOUNTER
---------------------  From: Hortencia Oconnell (Phone Messages Pool ([id number]_[facility]))   To: Rupal White;     Sent: 8/26/2020 12:44:52 PM CDT  Subject: CONSUMER MESSAGE: Insulin pump           ---------------------  From: ROSALIO MALLOY  To: [facility]  Sent: 08/26/2020 12:06 p.m. CDT  Subject: Insulin pump  Hi Rupal Christopher,  I am still getting low blood sugar readings in the evenings. Would you please call me for a possible adjustment on my pump?  Rosalio Malloy.---------------------  From: Rupal White   To: Phone Messages Pool (70713_[place]);     Sent: 8/26/2020 9:19:12 PM CDT  Subject: RE: CONSUMER MESSAGE: Insulin pump       Called pt to discuss pump recommendations due to low BG between midnight and 2am    Basal:   19.025  changed to 18.200  MN   0.625  0.550  3am   0.655    6:30am  0.850    13:00 0.850        Added 22:00  0.550    Pt comfortable with changes and will follow up as needed   chest pain

## 2022-02-15 NOTE — TELEPHONE ENCOUNTER
---------------------  From: Jeannie Mayers RN (Phone Messages Pool ([id number]_[facility]))   To: [employer] Message Pool (40565_[place]);     Sent: 11/19/2019 1:00:59 PM CST  Subject: FW: Cold?     Called Patient. We discuss getting adequate rest, fluids, Tylenol as needed. Use of saline nasal spray to ease congestion. Monitor blood sugar closely during illnesses. Observe for symptoms of sinus infection, eye pain, tooth pain, ear pain, purulent sinus drainage, significant HA. then to be seen. To be seen also for symptoms greater than 1 week or T > 101 or difficulty with breathing, or sleeping. Patient states has clear sinus drainage at this time, was copious yesterday and is reduced today. He is comfortable with observation at this time. All questions are answered. Patient states he appreciates the information. Message to [employer] for any further comment.        ---------------------  From: ROSALIO ANDRADE  To: [facility]  Sent: 11/19/2019 12:29 p.m. CST  Subject: Cold?  Merrill Joya,  I returned from [place] last Wednesday and developed what I think is a cold. Symptoms include nasal congestion, cough, fatigue, a low grade temperature (99.4), and just feeling lousy. I had a flu shot at my last appointment. What do you recommend?    Thanks,  Rosalio Andrade.---------------------  From: Malika Joya CMA ([facility] Message Pool (92517_[facility]))   To: Kb Mcqueen MD;     Sent: 11/19/2019 1:13:16 PM CST  Subject: FW: Cold?

## 2022-02-15 NOTE — TELEPHONE ENCOUNTER
---------------------  From: Fatuma Austin (Phone Messages Pool (63979_[facility]))   To: [facility] Message Pool ([id number]);     Sent: 9/23/2019 12:13:20 PM CDT  Subject: FW: FW: Contour 100 Test Strips           ---------------------  From: ROSALIO ANDRADE  To: [facility]  Sent: 09/23/2019 09:04 a.m. CDT  Subject: RE: FW: Contour 100 Test Strips  Merrill Burris,  When I spoke to the Pharmacist for [facility], she said that Dr. Mcqueen has to contact Medicare for approval. I am on my last test strips.  Rosalio Mari.  ---------------------  From: Fatuma Austin (Phone Messages Pool ([id number]))  To: ROSALIO ANDRADE  Sent: 9/23/2019 8:35:19 AM CDT  Subject: FW: Contour 100 Test Strips       Malika Glass and Dr. Mcqueen are not in clinic today, but they will be here tomorrow 9/24/19 from 8-5. Are you having problems with your insurance not covering them or do you just need a refill?       ThanksFatuma MA.            ---------------------  From: ROSALIO ANDRADE  To: [facility]  Sent: 09/20/2019 07:07 p.m. CDT  Subject: Contour 100 Test Strips  Malika Joya,  I am still having an issue with test strips.  Been working on it for over a week and am running short. [facility] in [place] wants Dr. Mcqueen to call Medicare for new prescription, 4 test strips/day. HELP.  Rosalio Mari.---------------------  From: Malika Joya CMA ([employer] Message Pool ([id number]))   To: ROSALIO ANDRADE    Sent: 9/24/2019 9:42:53 AM CDT  Subject: RE: FW: Contour 100 Test Strips     Ok I just got off the phone with Medicare and they are advising that you contact Member Services and she said they can often do an instant override.  She said if that doesn't work then we can try doing the prior authorization(they should already have the script on file from us indicating the reason for increased testing)    Member services #  [phone]  Reference# [id number]    Irwin please let me know if this doesn't work and then I will start working on the BJORN Daly

## 2022-02-15 NOTE — NURSING NOTE
Vital Signs Entered On:  11/23/2021 9:36 AM CST    Performed On:  11/23/2021 9:36 AM CST by Malika Joya CMA               Vital Signs   Systolic Blood Pressure :   144 mmHg (HI)    Diastolic Blood Pressure :   79 mmHg   Mean Arterial Pressure :   101 mmHg   Malika Joya CMA - 11/23/2021 9:36 AM CST

## 2022-02-15 NOTE — TELEPHONE ENCOUNTER
---------------------  From: Kb Mcqueen MD   To: ROSALIO ANDRADE    Sent: 4/24/2021 4:01:00 PM CDT  Subject: General Message     Pre-op labs look fine.      Results:  Date Result Name Ind Value Ref Range   4/23/2021 12:21 PM Sodium Level  137 mmol/L (135 - 146)   4/23/2021 12:21 PM Potassium Level  4.2 mmol/L (3.5 - 5.3)   4/23/2021 12:21 PM Chloride Level  102 mmol/L (98 - 110)   4/23/2021 12:21 PM CO2 Level  29 mmol/L (20 - 32)   4/23/2021 12:21 PM Glucose Level ((H)) 159 mg/dL (65 - 99)   4/23/2021 12:21 PM BUN  18 mg/dL (7 - 25)   4/23/2021 12:21 PM Creatinine Level  0.94 mg/dL (0.70 - 1.18)   4/23/2021 12:21 PM BUN/Creat Ratio  NOT APPLICABLE (6 - 22)   4/23/2021 12:21 PM eGFR  79 mL/min/1.73m2 (> OR = 60 - )   4/23/2021 12:21 PM eGFR African American  92 mL/min/1.73m2 (> OR = 60 - )   4/23/2021 12:21 PM Calcium Level  9.4 mg/dL (8.6 - 10.3)   4/23/2021 12:21 PM Hgb A1c ((H)) 8.5 ( - <5.7)   4/23/2021 12:21 PM TSH  1.21 mIU/L (0.40 - 4.50)   4/23/2021 12:21 PM U Creatinine  97 mg/dL (20 - 320)   4/23/2021 12:21 PM U Microalbumin  0.6 mg/dL (See Note: - )   4/23/2021 12:21 PM Ur Microalbumin/Creatinine Ratio  6 ( - <30)   4/23/2021 12:21 PM WBC  9.4 (3.8 - 10.8)   4/23/2021 12:21 PM RBC  4.60 (4.20 - 5.80)   4/23/2021 12:21 PM Hgb  15.4 gm/dL (13.2 - 17.1)   4/23/2021 12:21 PM Hct  44.8 % (38.5 - 50.0)   4/23/2021 12:21 PM MCV  97.4 fL (80.0 - 100.0)   4/23/2021 12:21 PM MCH ((H)) 33.5 pg (27.0 - 33.0)   4/23/2021 12:21 PM MCHC  34.4 gm/dL (32.0 - 36.0)   4/23/2021 12:21 PM RDW  13.7 % (11.0 - 15.0)   4/23/2021 12:21 PM Platelet  236 (140 - 400)   4/23/2021 12:21 PM MPV  10.2 fL (7.5 - 12.5)

## 2022-02-15 NOTE — TELEPHONE ENCOUNTER
---------------------  From: Ami Rojas CMA (Phone Messages Pool ([id number]_[facility]))   To: [employer] Message Pool (32224_[place]);     Sent: 9/26/2019 8:16:06 AM CDT  Subject: FW: Contour Test Strips           ---------------------  From: ROSALIO ANDRADE  To: [facility]  Sent: 09/25/2019 08:10 p.m. CDT  Subject: Contour Test Strips  Hi Malika Joya,    I went to [facility] to  my new prescription and it was a box of 50. She said the new script was for only 1 box. I thought the new prescription was for 400. She said she would check back with you tomorrow.    Rosalio Mari.---------------------  From: Malika Joya CMA ([employer] Message Pool (32224_[facility]))   To: ROSALIO ANDRADE    Sent: 9/26/2019 11:50:34 AM CDT  Subject: RE: Contour Test Strips     Hi Rosalio,  I have no idea where the box of 50 came from.  The recent scripts that were sent to [facility] was for 400ea with 3refills.  I did send that script to [facility] right now.  Hopefully no more glitches...    Malika

## 2022-02-15 NOTE — TELEPHONE ENCOUNTER
---------------------  From: Rupal White   To: Phone Messages Pool ([id number]_[facility]); ROSALIO MALLOY    Sent: 6/26/2019 8:36:42 AM CDT  Subject: RE: Medicare       Hi Rosalio,   I called and left a message on your home phone.  Medicare covers test strips with the exception of usually $2-10.00 for most patients.  Let me know what kind of issue you are having and I'll help resolve it.      Thank You,   Rupal White     ---------------------  From: Chelo Liz CMA (Phone Messages Pool ([id number]_[facility]))   To: Rupal White;     Sent: 6/25/2019 1:44:14 PM CDT  Subject: FW: Medicare           ---------------------  From: ROSALIO MALLOY  To: [facility]  Sent: 06/25/2019 01:35 p.m. CDT  Subject: Medicare  Hi Rupal,  Please call me regarding Medicare coverage of test strips: [phone] or cell: [phone].  Thanks,  Rosalio Malloy.

## 2022-02-15 NOTE — TELEPHONE ENCOUNTER
---------------------  From: Malika Joya CMA (Phone Messages Pool ([id number]_[facility]))   To: Rupal White;     Sent: 9/1/2020 9:28:38 AM CDT  Subject: FW: Insulin Pump and sensor.           ---------------------  From: ROSALIO ANDRADE  To: [facility]  Sent: 09/01/2020 09:26 a.m. CDT  Subject: Insulin Pump and sensor.  Dear Rupal White,  Would you call me in regards to a Insulin Pump and sensor?  [phone].  Rosalio Mari.---------------------  From: Rupal White   To: Malika Joya CMA;     Sent: 9/1/2020 11:42:53 AM CDT  Subject: RE: Insulin Pump and sensor.     Called pt - interested in a sensor now - we discussed options.  Pt would like call his insurance first before we order anything.  Pt will have to set up a video call with Dr. Mcqueen for CGMS information ~ thanks, Keshia     Patient has met criteria for CGM coverage;   - patient has ROLANDO diabetes mellitus; and  - patient has been using a blood glucose monitor and performing four or more blood glucose test per day; and  - patient is insulin treated with an insulin pump; and  - patient is requiring frequent insulin adjustments on the basis of blood glucose readings  - within six months prior to ordering the CGM, the patient has had an in-person visit with the practitioner; and determined that criteria (1-4) above are met; and   - every six months following the initial prescription of the CGM, the treating practitioner has an in-person visit with the patient to assess adherence to their CGM regimen and diabetes treatment plan

## 2022-02-15 NOTE — TELEPHONE ENCOUNTER
---------------------  From: Rupal White   To: Phone Messages Pool (52890_[place]);     Sent: 9/2/2020 4:22:45 PM CDT  Subject: RE: Dexcom CGM System     Returned call and talked with pt.  Ordered Dexcom G6 supplies at [facility]      ---------------------  From: Hortencia Oconnell (Phone [employer] Pool ([id number]_[facility]))   To: Rupal White;     Sent: 9/2/2020 2:26:41 PM CDT  Subject: FW: Dexcom CGM System           ---------------------  From: ROSALIO MALLOY  To: [facility]  Sent: 09/02/2020 02:08 p.m. CDT  Subject: Dexcom CGM System  Hi Rupal White,  Please call me regarding the [employer] CHM System. [phone].  Thanks,  Rosalio Malloy.

## 2022-02-15 NOTE — PROGRESS NOTES
Patient:   ROSALIO ANDRADE            MRN: [id number]            FIN: [id number]               Age:   72 years     Sex:  Male     :  1945   Associated Diagnoses:   Diabetes mellitus type 1; Hyperlipidemia NOS; Overweight (BMI 25.0-29.9)   Author:   Rupal White      Visit Information   Visit type:  Diabetes Self Management Education .    Referral source:  Richar RIZZO, Kb.       Chief Complaint   Uncontrolled DM Type 1 (ROLANDO), Hyperlipidemia, Overweight       History of Present Illness   Discussed nutrition, diabetes, and lifestyle management with pt.  Pt is here today to start Medtronic 630G insulin pump.  Pt has been reading through manuals and completed prepump preperation.    Nutrition:  Pt is counting carbohydrates and using insulin to carb ratio.  Pt's carb intake varies significantly.  Working on a carb controlled plan but overall intake is ~250gm/ day   Physical Activity:  1 hour daily variety (swim, walk, weights etc), goes to [place] over the winter months and walks 3 miles daily   Stress:   moderate  Sleep: good  Support: wife  Insulin:  Lantus 20u HS, Novolog 1u:7gm CHO with correction factor of 40 pts.  Avg Novolog u at meals 8-12u/ meal   BG Testing: ~ 4x/ day   Routine diabetes care:  up to date with eye, dental, and foot exams - no concerns  Estimated Average Glucose (eAG) 215 mg/dL with A1C of 9.1%       Health Status   Allergies:    Nonallergic Reactions (Selected)  Severity Not Documented  Atorvastatin (Joint pain)   Medications:  (Selected)   Prescriptions  Prescribed  B-D PEN NDL SHRT [id number]() CHRISTINA: B-D PEN NDL SHRT [id number]() CHRISTINA, See Instructions, Instructions: INJECT FOUR TIMES DAILY AS DIRECTED, Supply, # 400 EA, 3 Refill(s), Pharmacy: [facility] [id number], keep on hold and pt will notify when needed, INJECT FOUR TIMES DAILY AS DIRECTED...  CONTOUR TEST STRIPS 100'S: CONTOUR TEST STRIPS 100'S, See Instructions, Instructions: TEST FOUR TIMES DAILY AS DIRECTED, Supply, # 400 EA, 3  Refill(s), Pharmacy: [facility] [id number], keep on hold and pt will notify when needed, TEST FOUR TIMES DAILY AS DIRECTED  Contour next test strips: Contour next test strips, See Instructions, Instructions: testing 4x/ day - new start for insulin pump, Supply, # 8 bottle(s), 3 Refill(s), Type: Maintenance, Pharmacy: [facility] [id number], testing 4x/ day - new start for insulin pump  Contour test strips and lancets: Contour test strips and lancets, See Instructions, Instructions: testing 4 times daily, # 400 EA, 11 Refill(s), Type: Soft Stop, Pharmacy: [facility] [id number]  Glucose tabs: Glucose tabs, See Instructions, Instructions: use as needed for hypoglycemia, # 100 EA, 3 Refill(s), Type: Maintenance, Pharmacy: [facility]  NovoLOG 100 units/mL subcutaneous solution: See Instructions, Instructions: ~70u/ day use with insulin pump, # 60 mL, 3 Refill(s), Type: Maintenance, Pharmacy: [facility] [id number], Pt started Medtronic insulin pump 5/30/17 - insulin should now be covered, ~70u/ day use with insulin pump  ULTRA-FINE III SHORT PEN NEEDL MG INJECTABLE: ULTRA-FINE III SHORT PEN NEEDL MG INJECTABLE, See Instructions, Instructions: injects 4 times daily, # 400 EA, 11 Refill(s), Pharmacy: [facility] [id number]  aspirin 81 mg oral tablet: 1 tab(s) ( 81 mg ), PO, Daily, # 30 tab(s), 0 Refill(s), Type: Maintenance, OTC (Rx)  clobetasol 0.05% topical cream: 1 varsha, TOP, BID, Instructions: apply a thin film; apply to affected rash, # 15 g, 1 Refill(s), Type: Maintenance, Pharmacy: [facility] [address], 1 varsha top bid,Instr:apply a thin film; apply to affected rash  levothyroxine 200 mcg (0.2 mg) oral tablet: 1 tab(s) ( 200 mcg ), po, daily, # 90 tab(s), 3 Refill(s), Pharmacy: [facility] [address], keep on hold and pt will notify when needed, 1 tab(s) po daily  pravastatin 20 mg oral tablet: 1 tab(s) ( 20 mg ), PO, hs, # 90 tab(s), 3 Refill(s), Type: Maintenance, Pharmacy: [facility] [id number], keep on file and pt will notify when needed, 1 tab(s) po hs  Documented Medications  Documented  Cinnamon: ( 1,000 mg ), po, daily, 0 Refill(s), Type: Maintenance  Fish Oil: 0  Flax Seed Oil: 0  Metamucil: See Instructions, Instructions: 1 tbsp daily, 0 Refill(s), Type: Maintenance  Multiple Vitamins oral capsule: 1 cap(s), PO, Daily, 0 Refill(s)  Vitamin C: ( 1,000 mg ), daily, 0 Refill(s)   Problem list:    All Problems  Diabetes mellitus type 1 / ICD-9-.01 / Confirmed  Intolerance of drug / SNOMED CT 51897927 / Confirmed  Essential tremor / ICD-9-.1 / Confirmed  Hyperlipidemia / SNOMED CT 18728666 / Confirmed  Hypothyroid / ICD-9-.9 / Confirmed      Histories   Past Medical History:    Active  Hyperlipidemia (81624515)  Hypothyroid (244.9)  Comments:  2010 CST 1:40 PM CST - Kasis Link CMA  2007  Diabetes mellitus type 1 (250.01)  Comments:  2010 CST 1:41 PM GARCIA - Kassi Link CMA  2007  Essential tremor (333.1)   Family History:    Diabetes mellitus type I  Father ()  Bowel obstruction  Father ()  [place] - Cancer of uterus  Mother ()     Procedure history:    Screening for malignant neoplasm of colon (SNOMED CT 0211771479) on 2015 at 70 Years.  Comments:  2016 8:15 AM - Adali Pantoja is negative  Sialogram (SNOMED CT 671792596) on 2006 at 60 Years.  Comments:  3/29/2013 10:58 AM - Yoanna Porras  \"Stone.  Swollen gland.\"  removal of stone, left mandibular gland in the month of 2005 at 60 Years.  Sigmoidoscopy (SNOMED CT 05557069) performed by Sky Bui MD on 10/17/2002 at 57 Years.   Social History:        Alcohol Assessment: Low Risk            1-2 times per month, 1 drinks/episode average.      Tobacco Assessment: Past      Substance Abuse Assessment: Denies Substance Abuse      Employment and Education Assessment            Retired, Work/School description: /, [employer].  Previous               employment/school: [employer].      Home and Environment Assessment            Marital status: .  Spouse/Partner name: Vicenta.      Exercise and Physical Activity Assessment: Regular exercise            Exercise frequency: Daily.  Exercise type: Aerobics, Walking, Weight lifting.        Physical Examination   Measurements from flowsheet : Measurements   5/30/2017 1:55 PM CDT Height Measured - Standard 68.5 in    Weight Measured - Standard 171.5 lb    BSA 1.94 m2    Body Mass Index 25.69 kg/m2         Health Maintenance      Recommendations     Pending (in the next year)        OverDue           Tetanus Vaccine due  07/26/12  and every 10  year(s)           DM - Communication with Managing Provider due  05/29/16  and every 1  year(s)           DM - Foot Exam due  05/29/16  and every 1  year(s)           Influenza Vaccine due  11/24/16  and every 1  year(s)           DM - HgbA1c due  03/02/17  and every 3  month(s)        Due            Fall Risk Screen (Male) due  05/30/17  and every 1  year(s)           Lung Cancer Screen (Male) due  05/30/17  and every 1  year(s)        Refused            Zoster/Shingles Vaccine due  05/30/17  One-time only        Due In Future            DM - Microalbumin not due until  12/02/17  and every 1  year(s)           Alcohol Misuse Screen (Male) not due until  12/16/17  and every 1  year(s)           Depression Screen (Male) not due until  12/16/17  and every 1  year(s)           Tobacco Use Screen (Male) not due until  12/16/17  and every 1  year(s)           DM - Eye Exam not due until  12/23/17  and every 1  year(s)           High Blood Pressure Screen (Male) not due until  04/20/18  and every 1  year(s)     Satisfied (in the past 1 year)        Satisfied            Alcohol Misuse Screen (Male) on  12/16/16.           Body Mass Index Check (Male) on  05/30/17.           Body Mass Index Check (Male) on  04/18/17.            Body Mass Index Check (Male) on  08/23/16.           Body Mass Index Check (Male) on  06/15/16.           DM - Eye Exam on  12/23/16.           DM - HgbA1c on  12/02/16.           DM - HgbA1c on  06/02/16.           DM - Microalbumin on  12/02/16.           DM - Microalbumin on  12/02/16.           Depression Screen (Male) on  12/16/16.           Depression Screen (Male) on  12/16/16.           Depression Screen (Male) on  12/16/16.           High Blood Pressure Screen (Male) on  04/20/17.           High Blood Pressure Screen (Male) on  12/16/16.           High Blood Pressure Screen (Male) on  12/16/16.           High Blood Pressure Screen (Male) on  06/15/16.           Lipid Disorders Screen (Male) on  06/02/16.           Lipid Disorders Screen (Male) on  06/02/16.           Lipid Disorders Screen (Male) on  06/02/16.           Lipid Disorders Screen (Male) on  06/02/16.           Pneumococcal Vaccine on  04/21/17.           Tobacco Use Screen (Male) on  12/16/16.           Tobacco Use Screen (Male) on  06/15/16.          Review / Management   Results review:  Lab results   4/20/2017 8:46 AM CDT Glucose Level 220 mg/dL  HI    C-Peptide <0.10 ng/mL   .       Impression and Plan   Diagnosis     Diabetes mellitus type 1 (UMO75-LC E10.9).     Hyperlipidemia NOS (BSQ17-RZ E11.69).     Overweight (BMI 25.0-29.9) (HYG68-WO E66.3).       Insulin Pump start up edu completed.  Pump settings (basal/bolus), menu options and use, bolus wizzard, suspend, temp basel, BG testing recommendations, hypo/hyperglycemia guidelines, influsion set changes and fill and DKA prevention.  Insulin pump insertion completed successfully.      ICR=   12:00     7    Sensitivity=  40    Target     100 - 100     Basal: =     15 u/ day     MN-     0.625    Insulin action  3.5 hours  Max Bolus    17u  Max Basal   1u/ hr      Goals:   1.  Practice healthy stress management and get good quality sleep with the goal of 7-8 hours per night.    2.    Physical activity: Recommend 30 min of physical activity on 5 or more days/ week.    3.  Eat in a healthy way, per diabetic plate method.  Nutrition reference: Eat 3 meals a day; snacks are optional.  A meal is three or more food groups; make it colorful for better nutrition.    4. Count carbohydrates and use bolus wizzard for all meals and snacks   5.  BG testing4+x/ day initially with pump start Goal premeals 75 - 120; 2 hrs after meals 75 - 140   6.  Be aware of s/sx of hypo/ hyperglycemia and follow treatment protocol   7.  Always have back up pump supplies on hand and vial/ syringe with battery and glucose tablets   8.  follow up in 2-3 weeks          Professional Services   Time spent with pt 120 min   cc Dr. Kb Mcqueen

## 2022-02-15 NOTE — TELEPHONE ENCOUNTER
---------------------  From: Chelo Liz CMA (Phone Messages Pool ([id number]_[facility]))   To: ROSALIO ANDRADE    Sent: 6/25/2019 10:27:55 AM CDT  Subject: RE: Colonoscopy     Merrill Gayle,    The hospital that the colonoscopy is being done at will be calling you to confirm details. They call within 48 hours of the scheduled procedure. We at the clinic do not manage those details.     Kind Regards,    GUNNAR Whitney      ---------------------  From: ROSALIO ANDRADE  To: [facility]  Sent: 06/25/2019 10:10 a.m. CDT  Subject: Colonoscopy  Malika Joya,  Would you advise me of the scheduled time of my colonoscopy on Thursday, June 27 and the time I need to arrive?  Thanks,  Rosalio Andrade.

## 2022-02-15 NOTE — LETTER
(Inserted Image. Unable to display)     September 06, 2019      ROSALIO ANDRADE  [address] [place], [place] [id number]          Dear ROSALIO,      Thank you for selecting [facility]viously [facility] for your healthcare needs.      Our records indicate you are due for the following services:     Diabetic Exam ~ Please bring your glucose meter and/or your blood glucose diary to your appointment.    Fasting Lab Tests ~ Please do not eat or drink anything 10 hours prior to your scheduled appointment time.  (Water and any medications that you may need are allowed unless directed otherwise.)    If you had your labs done at another facility or with Direct Access Lab Testing at [facility], please bring in a copy of the results to your next visit, mail a copy, or drop off a copy of your results to your Healthcare Provider.      To schedule an appointment or if you have further questions, please contact your primary clinic:   [facility]       [phone]   [facility]       [phone]              [facility]     [phone]      Powered by [employer]    Sincerely,    Kb Mcqueen MD

## 2022-02-15 NOTE — NURSING NOTE
Quick Intake Entered On:  12/21/2021 2:22 PM CST    Performed On:  12/21/2021 2:21 PM CST by Rupal White               Summary   Weight Measured :   192 lb(Converted to: 192 lb 0 oz, 87.090 kg)    Height Measured :   67 in(Converted to: 5 ft 7 in, 170.18 cm)    Body Mass Index :   30.07 kg/m2 (HI)    Body Surface Area :   2.03 m2   Height/Length Estimated :   73 cm(Converted to: 2 ft 5 in, 28.74 in)    Rupal White - 12/21/2021 2:21 PM CST

## 2022-02-15 NOTE — TELEPHONE ENCOUNTER
---------------------  From: Malika Joya CMA (Phone Messages Pool ([id number]_[facility]))   To: IRWIN MALLOY    Sent: 9/26/2019 3:44:35 PM CDT  Subject: FW: Test Strips     Yeah!! :)        ---------------------  From: IRWIN MALLOY  To: [facility]  Sent: 09/26/2019 03:36 p.m. CDT  Subject: Test Strips  Dear Malika Joya,    They finally got the prescription correct and complete. Thanks for your help.  Onesimo!    Irwin Malloy.

## 2022-02-15 NOTE — LETTER
(Inserted Image. Unable to display)   May 03, 2019      ROSALIO ANDRADE  [address] [place], [place] [id number]                  Result Name Current Result Previous Result Reference Range   Lab Report   4/26/2019 9/14/2018    Hgb A1c ((H)) 8.3 4/26/2019   - <5.7   Glucose Level (mg/dL) ((H)) 209 4/26/2019  65 - 99   BUN (mg/dL)  18 4/26/2019  7 - 25   Creatinine Level (mg/dL)  0.90 4/26/2019  0.70 - 1.18   eGFR (mL/min/1.73m2)  84 4/26/2019  > OR = 60 -    eGFR  (mL/min/1.73m2)  98 4/26/2019  > OR = 60 -    BUN/Creat Ratio  NOT APPLICABLE 4/26/2019  6 - 22   Sodium Level (mmol/L)  135 4/26/2019  135 - 146   Potassium Level (mmol/L)  4.4 4/26/2019  3.5 - 5.3   Chloride Level (mmol/L)  100 4/26/2019  98 - 110   CO2 Level (mmol/L)  26 4/26/2019  20 - 32   Calcium Level (mg/dL)  9.3 4/26/2019  8.6 - 10.3   Ur Creatinine (mg/dL)  152 4/26/2019  20 - 320   U Microalbumin (mg/dL)  0.3 4/26/2019  See Note: -    Ur Microalbumin/Creatinine Ratio  2 4/26/2019   - <30         Sincerely,        Kb Mcqueen MD

## 2022-02-15 NOTE — NURSING NOTE
Quick Intake Entered On:  12/17/2020 12:45 PM CST    Performed On:  12/17/2020 12:45 PM CST by Rupal White               Summary   Weight Measured :   190.2 lb(Converted to: 190 lb 3 oz, 86.273 kg)    Height Measured :   67 in(Converted to: 5 ft 7 in, 170.18 cm)    Body Mass Index :   29.79 kg/m2 (HI)    Body Surface Area :   2.02 m2   Height/Length Estimated :   73 cm(Converted to: 2 ft 5 in, 28.74 in)    Rupal White - 12/17/2020 12:45 PM CST

## 2022-02-15 NOTE — TELEPHONE ENCOUNTER
---------------------  From: Kb Mcqueen MD   To: LUPE ROSALIO SILVA    Sent: 5/8/2020 2:53:44 PM CDT  Subject: Patient Message - Results Notification         Labs for your review.  We can discuss in more detail at future clinic visit.     Results:  Date Result Name Ind Value Ref Range   5/5/2020 3:28 PM Sodium Level  136 mmol/L (135 - 146)   5/5/2020 3:28 PM Potassium Level  4.0 mmol/L (3.5 - 5.3)   5/5/2020 3:28 PM Chloride Level  100 mmol/L (98 - 110)   5/5/2020 3:28 PM CO2 Level  30 mmol/L (20 - 32)   5/5/2020 3:28 PM Glucose Level ((H)) 146 mg/dL (65 - 99)   5/5/2020 3:28 PM BUN  18 mg/dL (7 - 25)   5/5/2020 3:28 PM Creatinine Level  1.00 mg/dL (0.70 - 1.18)   5/5/2020 3:28 PM BUN/Creat Ratio  NOT APPLICABLE (6 - 22)   5/5/2020 3:28 PM eGFR  74 mL/min/1.73m2 (> OR = 60 - )   5/5/2020 3:28 PM eGFR African American  86 mL/min/1.73m2 (> OR = 60 - )   5/5/2020 3:28 PM Calcium Level  9.4 mg/dL (8.6 - 10.3)   5/5/2020 3:28 PM Hgb A1c ((H)) 8.4 ( - <5.7)   5/5/2020 3:28 PM TSH ((L)) 0.08 mIU/L (0.40 - 4.50)   5/5/2020 3:28 PM U Microalbumin  0.2 mg/dL (See Note: - )   5/5/2020 3:28 PM Ur Creatinine  74 mg/dL (20 - 320)   5/5/2020 3:28 PM Ur Microalbumin/Creatinine Ratio  3 ( - <30)

## 2022-02-15 NOTE — NURSING NOTE
Comprehensive Intake Entered On:  8/19/2021 12:42 PM CDT    Performed On:  8/19/2021 12:37 PM CDT by Fatuma Austin               Summary   Chief Complaint :   Patient here for preop at [facility] with Dr. Paul for L rotator cuff surgery on 8/23/21.    Weight Measured :   188.3 lb(Converted to: 188 lb 5 oz, 85.411 kg)    Height Measured :   67 in(Converted to: 5 ft 7 in, 170.18 cm)    Body Mass Index :   29.49 kg/m2 (HI)    Body Surface Area :   2.01 m2   Height/Length Estimated :   73 cm(Converted to: 2 ft 5 in, 28.74 in)    Systolic Blood Pressure :   146 mmHg (HI)    Diastolic Blood Pressure :   76 mmHg   Mean Arterial Pressure :   99 mmHg   Peripheral Pulse Rate :   72 bpm   BP Site :   Right arm   BP Method :   Electronic   HR Method :   Electronic   Temperature Tympanic :   97.6 DegF(Converted to: 36.4 DegC)  (LOW)    Oxygen Saturation :   98 %   Fatuma Austin - 8/19/2021 12:37 PM CDT   Health Status   Allergies Verified? :   Yes   Medication History Verified? :   Yes   Medical History Verified? :   Yes   Pre-Visit Planning Status :   Completed   Tobacco Use? :   Former smoker   Fatuma Austin - 8/19/2021 12:37 PM CDT   Consents   Consent for Immunization Exchange :   Consent Granted   Consent for Immunizations to Providers :   Consent Granted   Fatuma Austin - 8/19/2021 12:37 PM CDT   Meds / Allergies   (As Of: 8/19/2021 12:42:54 PM CDT)   Allergies (Active)   No known allergies  Estimated Onset Date:   Unspecified ; Created By:   Trini Michaels CMA; Reaction Status:   Active ; Category:   Drug ; Substance:   No known allergies ; Type:   Allergy ; Updated By:   Trini Michaels CMA; Reviewed Date:   8/19/2021 12:41 PM CDT        Medication List   (As Of: 8/19/2021 12:42:54 PM CDT)   Prescription/Discharge Order    pravastatin  :   pravastatin ; Status:   Prescribed ; Ordered As Mnemonic:   pravastatin 20 mg oral tablet ; Simple Display Line:   See Instructions, TAKE 1 TABLET BY MOUTH ONCE DAILY AT  BEDTIME, 90 unknown unit, 3 Refill(s) ; Ordering Provider:   Kb Mcqueen MD; Catalog Code:   pravastatin ; Order Dt/Tm:   12/1/2020 9:43:58 AM CST          Miscellaneous Rx Supply  :   Miscellaneous Rx Supply ; Status:   Prescribed ; Ordered As Mnemonic:   Glucose tabs ; Simple Display Line:   See Instructions, use as needed for hypoglycemia, 100 EA ; Ordering Provider:   Zachery Koehler MD; Catalog Code:   Miscellaneous Rx Supply ; Order Dt/Tm:   3/29/2011 9:39:13 AM CDT          Miscellaneous Rx Supply  :   Miscellaneous Rx Supply ; Status:   Prescribed ; Ordered As Mnemonic:   Dexcom G6 transmitter ; Simple Display Line:   See Instructions, change every 3 months, 1 EA, 3 Refill(s) ; Ordering Provider:   Kb Mcqueen MD; Catalog Code:   Miscellaneous Rx Supply ; Order Dt/Tm:   5/17/2021 3:09:46 PM CDT          Miscellaneous Rx Supply  :   Miscellaneous Rx Supply ; Status:   Prescribed ; Ordered As Mnemonic:   Dexcom G6 sensors ; Simple Display Line:   See Instructions, change sensor every 10 days  E10.9, 9 EA, 11 Refill(s) ; Ordering Provider:   Kb Mcqueen MD; Catalog Code:   Miscellaneous Rx Supply ; Order Dt/Tm:   5/17/2021 3:09:45 PM CDT          Miscellaneous Rx Supply  :   Miscellaneous Rx Supply ; Status:   Prescribed ; Ordered As Mnemonic:   Dexcom G6  ; Simple Display Line:   See Instructions, use to see glucose readings, 1 EA, 1 Refill(s) ; Ordering Provider:   Kb Mcqueen MD; Catalog Code:   Miscellaneous Rx Supply ; Order Dt/Tm:   5/17/2021 3:09:46 PM CDT          Miscellaneous Prescription  :   Miscellaneous Prescription ; Status:   Prescribed ; Ordered As Mnemonic:   ULTRA-FINE III SHORT PEN NEEDL MG INJECTABLE ; Simple Display Line:   See Instructions, injects 4 times daily, 400 EA ; Ordering Provider:   Zachery Koehler MD; Catalog Code:   Miscellaneous Prescription ; Order Dt/Tm:   6/5/2012 4:48:13 PM CDT          Miscellaneous Prescription  :   Miscellaneous Prescription ; Status:    Prescribed ; Ordered As Mnemonic:   CONTOUR NEXT TEST STRIPS ; Simple Display Line:   See Instructions, pt testing 4 times daily- pt has a pump and needing to test more often due to fluctuating BS's - Dx - Z96.41 and E10.9, 400 EA, 3 Refill(s) ; Ordering Provider:   Kb Mcqueen MD; Catalog Code:   Miscellaneous Prescription ; Order Dt/Tm:   9/26/2019 11:48:12 AM CDT          Miscellaneous Prescription  :   Miscellaneous Prescription ; Status:   Prescribed ; Ordered As Mnemonic:   CONTOUR NEXT TEST STRIP ; Simple Display Line:   See Instructions, TEST 4 TIMES A DAY, 400 unknown unit, 3 Refill(s) ; Ordering Provider:   Kb Mcqueen MD; Catalog Code:   Miscellaneous Prescription ; Order Dt/Tm:   9/17/2019 11:41:58 AM CDT          levothyroxine  :   levothyroxine ; Status:   Prescribed ; Ordered As Mnemonic:   levothyroxine 175 mcg (0.175 mg) oral tablet ; Simple Display Line:   175 mcg, 1 tab(s), Oral, daily, 90 tab(s), 3 Refill(s) ; Ordering Provider:   Kb Mcqueen MD; Catalog Code:   levothyroxine ; Order Dt/Tm:   12/1/2020 8:50:19 AM CST          insulin aspart  :   insulin aspart ; Status:   Prescribed ; Ordered As Mnemonic:   NovoLOG 100 units/mL injectable solution ; Simple Display Line:   See Instructions, INJECT UP TO 70 UNITS DAILY VIA PUMP-  Dx E10.9, 90 mL, 3 Refill(s) ; Ordering Provider:   Kb Mcqueen MD; Catalog Code:   insulin aspart ; Order Dt/Tm:   12/1/2020 9:43:57 AM CST          glucagon  :   glucagon ; Status:   Processing ; Ordered As Mnemonic:   Glucagon Emergency Kit for Low Blood Sugar 1 mg injection ; Ordering Provider:   Kb Mcqueen MD; Action Display:   Complete ; Catalog Code:   glucagon ; Order Dt/Tm:   8/19/2021 12:41:18 PM CDT          aspirin  :   aspirin ; Status:   Prescribed ; Ordered As Mnemonic:   aspirin 81 mg oral tablet ; Simple Display Line:   81 mg, 1 tab(s), PO, Daily, 30 tab(s) ; Ordering Provider:   Zachery Koehler MD; Catalog Code:   aspirin ; Order Dt/Tm:   3/2/2012  11:59:54 AM CST            Home Meds    psyllium  :   psyllium ; Status:   Documented ; Ordered As Mnemonic:   Metamucil ; Simple Display Line:   Oral, 0 Refill(s) ; Catalog Code:   psyllium ; Order Dt/Tm:   5/19/2020 8:11:08 AM CDT

## 2022-02-15 NOTE — TELEPHONE ENCOUNTER
---------------------  From: Alverto ARDON, Litzy BOOTHE (Phone Messages Pool ([id number]_[facility]))   To: ROSALIO ANDRADE    Sent: 11/22/2019 9:52:42 AM CST  Subject: FW: Refill a perscription     Merrill Gayle,    Your levothyroxine was sent to [facility].    ThanksLitzy LPN        ---------------------  From: ROSALIO ANDRADE  To: [facility]  Sent: 11/22/2019 09:36 a.m. CST  Subject: Refill a perscription  Malika Joya,  Would you please refill my Levothyroxine prescription and send it to [facility].  Thanks,  Rosalio Andrade.

## 2022-02-15 NOTE — PROGRESS NOTES
Patient:   ROSALIO ANDRADE            MRN: [id number]            FIN: [id number]               Age:   76 years     Sex:  Male     :  1945   Associated Diagnoses:   Hypothyroid; Hyperlipidemia; Diabetes mellitus type 1; Tremor, Essential   Author:   Kb Mcqueen MD      Visit Information      Date of Service: 2021 09:24 am  Performing Location: [facility]  Encounter#: [id number]      Primary Care Provider (PCP):  Kb Mcqueen MD    NPI# [id number]      Referring Provider:  Kb Mcqueen MD    NPI# [id number]      Chief Complaint   2021 9:33 AM CST   f/u chronic              Additional Information:No additional information recorded during visit.   Chief complaint and symptoms as noted above and confirmed with patient      History of Present Illness   4/10/2014: Rosalio presents to clinic to establish care, previously followed by JAMES approximately the last 10 yrs. he s been diagnosed with adult onset type 1 diabetes, insulin managed with a combination of basal bolus insulin therapy.  Historically his glycemic control has not been optimal. Is very knowledgeable about carbohydrate counting and insulin to carbohydrate ratios.  He says that his main nemesis is evening snacking.  He will typically bolus with NovoLog when eating popcorn, otherwise does not bolus with snacking.  He has no known diabetic complications.  He remains very active with daily exercise, primarily swimming.  He swam back in college.  He resides in [place] for a portion of the year.  He s been resistant to starting on cholesterol medicines in the past.  Also with complaints of left sided elbow/forearm pains - not improved with forearm brace.  He is a retired orchestral director and plays base cello - arm pains limiting his abilities.    2021: Rosalio returns to clinic for follow-up.  Generally frustrated about his recovery time from his shoulder surgeries.  Had a subsequent rotator cuff and bicipital muscle repair in  August.  Working with physical therapy and home exercises.  Is not been able to do any swimming to date.  Planning on leaving for [place] end of the year.  Discouraged by his lack of activity related to the shoulder.  Doing well with Dexcom sensor denies any significant hypoglycemia.           Review of Systems   Constitutional:  No fever, No chills.    Eye:  Negative except as documented in history of present illness.    Ear/Nose/Mouth/Throat:  Negative except as documented in history of present illness.    Respiratory:  No shortness of breath.    Cardiovascular:  No chest pain, No palpitations, No peripheral edema, No syncope.    Gastrointestinal:  No nausea, No vomiting, No abdominal pain.    Genitourinary:  No dysuria, No hematuria.    Hematology/Lymphatics:  Negative except as documented in history of present illness.    Endocrine:  No excessive thirst, No polyuria.    Immunologic:  No recurrent fevers.    Musculoskeletal:  Joint pain, Decreased range of motion, Trauma, No muscle pain.    Neurologic:  Alert and oriented X4, tremor, No numbness, No tingling, No headache.       Health Status   Allergies:    Allergic Reactions (Selected)  No known allergies   Medications:  (Selected)   Prescriptions  Prescribed  Dexcom G6 : Dexcom G6 , See Instructions, Instructions: use to see glucose readings, Supply, # 1 EA, 1 Refill(s), Type: Maintenance  Dexcom G6 sensors: Dexcom G6 sensors, See Instructions, Instructions: change sensor every 10 days  E10.9, Supply, # 9 EA, 11 Refill(s), Type: Maintenance  Dexcom G6 transmitter: Dexcom G6 transmitter, See Instructions, Instructions: change every 3 months, Supply, # 1 EA, 3 Refill(s), Type: Maintenance, Pharmacy: [facility], change every 3 months, 67, in, 11/01/21 11:40:00 CDT, Height Measured, 186, lb, 11/01/21 11:40:00 CDT...  Glucose tabs: Glucose tabs, See Instructions, Instructions: use as needed for hypoglycemia, # 100 EA, 3 Refill(s), Type:  Maintenance, Pharmacy: [facility]  NovoLOG 100 units/mL injectable solution: See Instructions, Instructions: INJECT UP TO 70 UNITS DAILY VIA PUMP-  Dx E10.9, # 90 mL, 3 Refill(s), Type: Maintenance, Pharmacy: [facility], INJECT UP TO 70 UNITS DAILY VIA PUMP-  Dx E10.9, 67, in, 11/23/21 9:33:00 CST, Height Measured, 191.5, lb...  aspirin 81 mg oral tablet: 1 tab(s) ( 81 mg ), PO, Daily, # 30 tab(s), 0 Refill(s), Type: Maintenance, OTC (Rx)  levothyroxine 175 mcg (0.175 mg) oral tablet: = 1 tab(s), Oral, daily, # 90 tab(s), 3 Refill(s), Type: Maintenance, Pharmacy: [facility], due for visit next month, 1 tab(s) Oral daily, 67, in, 11/23/21 9:33:00 CST, Height Measured, 191.5, lb, 11/23/21 9:33:00 CST, Weight Measured  losartan 50 mg oral tablet: = 1 tab(s) ( 50 mg ), Oral, daily, # 90 tab(s), 3 Refill(s), Type: Maintenance, Pharmacy: [facility], 1 tab(s) Oral daily, 67, in, 11/23/21 9:33:00 CST, Height Measured, 191.5, lb, 11/23/21 9:33:00 CST, Weight Measured  pravastatin 20 mg oral tablet: = 1 tab(s), Oral, qhs, # 90 tab(s), 3 Refill(s), Type: Maintenance, Pharmacy: [facility], due for a visit next month, 1 tab(s) Oral qhs, 67, in, 11/23/21 9:33:00 CST, Height Measured, 191.5, lb, 11/23/21 9:33:00 CST, Weight Measured  Documented Medications  Documented  Metamucil: Oral, 0 Refill(s), Type: Maintenance,    Medications          *denotes recorded medication          Glucose tabs: See Instructions, use as needed for hypoglycemia, 100 EA.          Dexcom G6 : See Instructions, use to see glucose readings, 1 EA, 1 Refill(s).          Dexcom G6 sensors: See Instructions, change sensor every 10 days  E10.9, 9 EA, 11 Refill(s).          Dexcom G6 transmitter: See Instructions, change every 3 months, 1 EA, 3 Refill(s).          aspirin 81 mg oral tablet: 81 mg, 1 tab(s), PO, Daily, 30 tab(s).          NovoLOG 100 units/mL injectable solution: See Instructions, INJECT UP TO 70 UNITS DAILY VIA PUMP-  Dx E10.9,  90 mL, 3 Refill(s).          levothyroxine 175 mcg (0.175 mg) oral tablet: 1 tab(s), Oral, daily, 90 tab(s), 3 Refill(s).          losartan 50 mg oral tablet: 50 mg, 1 tab(s), Oral, daily, 90 tab(s), 3 Refill(s).          pravastatin 20 mg oral tablet: 1 tab(s), Oral, qhs, 90 tab(s), 3 Refill(s).          *Metamucil: Oral, 0 Refill(s).       Problem list:    All Problems  Intolerance of drug / SNOMED CT 99451525 / Confirmed  Essential tremor / SNOMED CT 3921540042 / Confirmed  Hyperlipidemia / SNOMED CT 61109945 / Confirmed  Hypothyroid / SNOMED CT 44951927 / Confirmed  Insulin pump in place / SNOMED CT 9997444826 / Confirmed  Diabetes mellitus type 1 / SNOMED CT 793554349 / Confirmed  Canceled: Long term (current) use of insulin / SNOMED CT 287596511      Histories   Past Medical History:    Active  Hyperlipidemia (70379859)  Hypothyroid (52039745)  Comments:  2010 CST 1:40 PM Kassi Gonzalez CMA  2007  Diabetes mellitus type 1 (295354485)  Comments:  2010 CST 1:41 PM Kassi Gonzalez CMA  2007  Essential tremor (7870029261)   Family History:    Diabetes mellitus type I  Father ()  Bowel obstruction  Father ()  CA - Cancer of uterus  Mother ()     Procedure history:    Repair of rotator cuff of shoulder ([id number]) on 2021 at 76 Years.  Comments:  2021 10:50 AM Marianna Ryan  Recurrent, after injury when reaching for an object.    2021 10:47 AM Marianna Ryan.  Rotator cuff repair (091738819) on 5/3/2021 at 76 Years.  Comments:  2021 12:22 PM Marianna Ryan.  Colonoscopy (277613460) on 2019 at 74 Years.  Comments:  2020 8:52 AM Marianna Ryan  Indication: Positive Cologuard.             Sedation: MAC.    2019 9:23 AM CDT - Malika Joya CMA  Tubular adenoma - negative for high grade dysplasia - Repeat in 3years  Screening for colon cancer (2639580409) on 2019 at 74 Years.  Comments:  6/3/2019 4:46 PM  "CDT - Jagjit ROCHA, Yoanna Fowler result:  positive  Recommendation:  needs colonoscopy  Screening for malignant neoplasm of colon (4472682518) on 12/7/2015 at 70 Years.  Comments:  1/30/2016 8:15 AM CST - Adali Pantoja is negative  Sialogram (160517394) on 2/1/2006 at 60 Years.  Comments:  3/29/2013 10:58 AM CDT - Yoanna Porras  \"Stone.  Swollen gland.\"  removal of stone, left mandibular gland in the month of 12/2005 at 60 Years.  Sigmoidoscopy (61219133) on 10/17/2002 at 57 Years.  Insulin pump, device (1010164174).   Social History:        Electronic Cigarette/Vaping Assessment            Electronic Cigarette Use: Never.      Alcohol Assessment            1-2 times per month, 1 drinks/episode average.      Tobacco Assessment            Former smoker, quit more than 30 days ago, Cigarettes      Employment and Education Assessment            Retired, Work/School description: /, [employer].  Previous               employment/school: [employer].      Home and Environment Assessment            Marital status: .  Spouse/Partner name: Vicenta.      Exercise and Physical Activity Assessment            Exercise frequency: Daily.  Exercise type: Aerobics, Walking, Weight lifting.        Physical Examination   vital signs stable, as noted above   Vital Signs   11/23/2021 9:36 AM CST Systolic Blood Pressure 144 mmHg  HI    Diastolic Blood Pressure 79 mmHg    Mean Arterial Pressure 101 mmHg   11/23/2021 9:33 AM CST Temperature Tympanic 97 DegF  LOW    Peripheral Pulse Rate 94 bpm    Systolic Blood Pressure 145 mmHg  HI    Diastolic Blood Pressure 71 mmHg    Mean Arterial Pressure 96 mmHg    Oxygen Saturation 100 %      Measurements from flowsheet : Measurements   11/23/2021 9:33 AM CST Height Measured - Standard 67 in    Height/Length Estimated 73 cm    Weight Measured - Standard 191.5 lb    BSA 2.02 m2    Body Mass Index 29.99 kg/m2  HI    "   General:  Alert and oriented, No acute distress.    HENT:  Tympanic membranes are clear.    Neck:  Supple, Non-tender, No carotid bruit.    Respiratory:  Lungs are clear to auscultation, Respirations are non-labored.    Cardiovascular:  Normal rate, Regular rhythm, No murmur, No edema.    Gastrointestinal:  Wearing insulin pump.    Musculoskeletal:  Normal range of motion, Normal strength.    Feet:  By monofilament exam, significant onychomycosis.    Neurologic:  Alert, Oriented, Normal motor function, No focal deficits.    Cognition and Speech:  Oriented, Speech clear and coherent.    Psychiatric:  Appropriate mood & affect.       Review / Management   Results review:  Lab results   11/16/2021 8:03 AM CST Hgb A1c 7.6  HI    Cholesterol 220 mg/dL  HI    Non-  HI    HDL 73 mg/dL    Chol/HDL Ratio 3.0      HI    Triglyceride 96 mg/dL    TSH 1.68 mIU/L   8/19/2021 1:07 PM CDT Sodium Level 134 mmol/L  LOW    Potassium Level 4.5 mmol/L    Chloride Level 101 mmol/L    CO2 Level 25 mmol/L    Glucose Level 230 mg/dL  HI    BUN 15 mg/dL    Creatinine 0.87 mg/dL    BUN/Creat Ratio NOT APPLICABLE    eGFR 84 mL/min/1.73m2    eGFR African American 98 mL/min/1.73m2    Calcium Level 8.8 mg/dL    Hgb A1c 7.8  HI   .       Impression and Plan   Diagnosis     Hypothyroid (SAN36-OY E03.9).     Hyperlipidemia (DVM00-WB E78.5).     Diabetes mellitus type 1 (TEQ39-QK E10.9).     Tremor, Essential (MXI63-HN G25.0).       .)  left shoulder arthroscopic repair x2 of rotator cuff tear,  Dr. Paul   - slow progress   - has not been able to participate in weights or swimming    .) type I DM, uncontrolled, late onset diabetes in adulthood (ROLANDO)  hypoglycemic medications:  insulin therapy:  Medtronic pump (initiated in '17),  started on Dexcom6 CGM (9/2020) - hopeful to transition to Tandem device in the future   ICR  12:00     6  5:00am     5 changing to 5.5  15:00     6     Sensitivity  0:00      37 changing to 40  adding  20:00  40    Target   120 - 120 changing to 125 - 125    Basal:   20.213 decreased slightly to 19.9  MN   0.575  3am   0.675  0.650    6:30am  0.95   0.925  13:00 0.95   18:00 0.95    22:00  0.80   0.775    Insulin action  3.5 hours  Max Bolus    17u  Max Basal   1u/ hr     30 day Average sensor glucose 166 mg/dL (87% CGM active)     Time in target range 70 - 180  62% (goal 70%+)  High 181 - 250    21%  Very High >250    14%  Low 54 - 69     2.0%  Very low <54     <1%    Basal 48%  Bolus 52%    last HbA1c: 7.6%   microvascular complications: none  macrovascular complications: none  ASA/SIMON/statin:  ASA 81mg daily, pravastatin 20mg qhs    .) hypothyroidism   -  levothyroxine 175mcg daily    .) ED   - hospitals prn    health maintenance   -Cologuard positive(5/2019); colonoscopy (6/2019); multiple adenomatous polyps - repeat study in 3 yrs   - PSA 0.3 (11/2015) - declines regular future screening   - intentional tremor (worse in AM) - suspect essential tremor; likely worsened by caffeine.  Low suspicions for parkinsons   - completed COVID vaccination + booster    RTC in 6 months

## 2022-02-15 NOTE — NURSING NOTE
Quick Intake Entered On:  12/2/2019 11:37 AM CST    Performed On:  12/2/2019 11:37 AM CST by Rupal White               Summary   Weight Measured :   182.6 lb(Converted to: 182 lb 10 oz, 82.83 kg)    Height Measured :   68.5 in(Converted to: 5 ft 8 in, 173.99 cm)    Body Mass Index :   27.36 kg/m2 (HI)    Body Surface Area :   2 m2   Rupal White - 12/2/2019 11:37 AM CST

## 2022-02-15 NOTE — TELEPHONE ENCOUNTER
Entered by Malika Joya CMA on November 25, 2019 8:56:31 AM CST  contacted pt at 0856 and advised an appt, he agrees and was transferred to scheduling      ---------------------  From: IRWIN MALLOY  To: [facility]  Sent: 11/25/2019 08:42 a.m. GARCIA  Subject: Cold  Hi Malika,  My cold is worse, feels like bronchitis and I ve had it about a week. Symptoms include coughing especially at night, heavy congestion, phlegm is yellow, lots of nose blowing, no temperature. I ve taken Sudafed, Tylenol, Vicks Vaporub and cough drops. My blood sugars are good.  Please advise.  Irwin Malloy.

## 2022-02-15 NOTE — TELEPHONE ENCOUNTER
Entered by Fatuma Austin on September 11, 2019 9:16:18 AM CDT  Date of last office visit and reason:  7/8/19 DM       Date of last Med Check / Px:   7/8/19  Date of last labs pertaining to med:  4/26/19    RTC order in chart:  Yes. Placed 5/3/19. Due now for labs     For Protocol refill, has patient been contacted:  Yes. Reminder letter sent.             ------------------------------------------  From: [id number]  To: Kb Mcqueen MD  Sent: September 11, 2019 1:48:12 AM CDT  Subject: Medication Management  Due: September 12, 2019 1:48:12 AM CDT    ** On Hold Pending Signature **  Drug: insulin aspart (NovoLOG 100 units/mL injectable solution)  INJECT UP TO 70 UNITS DAILY VIA PUMP  Quantity: 30 mL       Days Supply: 42        Refills: 0  Substitutions Allowed  Notes from Pharmacy:     Dispensed Drug: insulin aspart (NovoLOG 100 units/mL injectable solution)  INJECT UP TO 70 UNITS DAILY VIA PUMP  Quantity: 30 mL       Days Supply: 42        Refills: 0  Substitutions Allowed  Notes from Pharmacy:   ---------------------------------------------------------------  From: Fatuma Austin   To: [id number]    Sent: 9/11/2019 9:16:37 AM CDT  Subject: Medication Management     ** Submitted: **  Order:insulin aspart (NovoLOG 100 units/mL injectable solution)  See Instructions  INJECT UP TO 70 UNITS DAILY VIA PUMP  Qty:  30 mL        Days Supply:  42        Refills:  0          Substitutions Allowed     Route To Pharmacy - [id number]    Signed by Fatuma Austin  9/11/2019 9:16:00 AM    ** Submitted: **  Complete:insulin aspart (NovoLOG 100 units/mL injectable solution)   Signed by Fatuma Austin  9/11/2019 9:16:00 AM    ** Not Approved:  **  insulin aspart (NOVOLOG 100 UNIT/ML VIAL)  INJECT UP TO 70 UNITS DAILY VIA PUMP  Qty:  30 mL        Days Supply:  42        Refills:  0          Substitutions Allowed     Route To Pharmacy [id number]   Signed by Bob/Fatuma ROCHA

## 2022-02-15 NOTE — PROGRESS NOTES
"   Patient:   ROSALIO ANDRADE            MRN: [id number]            FIN: [id number]               Age:   75 years     Sex:  Male     :  1945   Associated Diagnoses:   Diabetes mellitus type 1; Hyperlipidemia NOS; Overweight (BMI 25.0-29.9)   Author:   Rupal White      Visit Information   Visit type:  Diabetes Self Management Education visit was conducted via telephone due to the COVID-19 pandemic. Patient consent, as follows, for telephone visit was obtained.  \"You have been scheduled for a telephone visit, which is a billable service.  This is a replacement for a face-to-face visit that is being recommended at this time to help keep our patients safe.  If during your phone visit you need a face - to - face visit, your phone visit will be cancelled and you will be rescheduled to come in to the clinic.  Are you agreeable with proceeding with a telephone visit?\".    Referral source:  Richar RIZZO, Kb.       Chief Complaint   Uncontrolled DM Type 1 (ROLANDO), Hyperlipidemia, Overweight       History of Present Illness   Discussed nutrition, diabetes, and lifestyle management with pt.  Pt was in office on 2020 to download pump and have labs completed.  Pt on telemedicine is here today to adjust pump settings and discuss lab results.    Pt using Medtronic 630G insulin pump.  Pt started pump on 17.  Pt is not interested in a CGMS system at this time as it is not being covered at 100% cost.      a1c 8.4% = 195 eAG above pt's personal target of <8% and higher than ADA goal of <7%.    Nutrition:  Carb intake often 70 - 90+gm at one time - higher than recommendations.  Encourage spreading carbs out throughout the day to help decrease post prandial glycemic excursions.    Typical breakfast cereal with milk, whole banana, toast, vegetable juice - has not changed   Working on trying to bolus 15 min prior to eating     Physical [facility]:  [facility], did some swimming in [place] but " also closed early)   Stress:   moderate  Sleep: good  Support: wife  Insulin:  Novolog via pump ~48.06u/ day   BG Testing: ~ 4.3x/ day   Routine diabetes care:  up to date with eye (4/29/19), dental, and foot exams - no concerns      BG avg 181 +/- 120 mg/dL (significant variability)  Avg Daily Basal 16.58 (35%)  Avg Daily Bolus 31.47 (66%)  19% (11 readings below 70mg/dL)     ICR=   12:00     7 changed to 7.5  15:00     7 changed to 7.5    Sensitivity=  0:00  45  6:30  42   20:30  45      Target     120 - 120     Basal:  16.588  increased to 17.175  MN   0.575  0.600  3am   0.575  0.600  6:30am  0.750  0.775  13:00  0.725  0.750      Insulin action  3.5 hours  Max Bolus    17u  Max Basal   1u/ hr       Health Status   Allergies:    Nonallergic Reactions (Selected)  Severity Not Documented  Atorvastatin (Joint pain)   Medications:  (Selected)   Prescriptions  Prescribed  CONTOUR NEXT TEST STRIP: See Instructions, Instructions: TEST 4 TIMES A DAY, # 400 unknown unit, 3 Refill(s), Type: Maintenance, Pharmacy: [id number], TEST 4 TIMES A DAY  CONTOUR NEXT TEST STRIPS: CONTOUR NEXT TEST STRIPS, See Instructions, Instructions: pt testing 4 times daily- pt has a pump and needing to test more often due to fluctuating BS's - Dx - Z96.41 and E10.9, Supply, # 400 EA, 3 Refill(s), Type: Maintenance, Pharmacy: "[facility]...  Glucagon Emergency Kit for Low Blood Sugar 1 mg injection: See Instructions, Instructions: 1mg injection if pt unable to safely consume glucose, # 1 EA, 0 Refill(s), Type: Maintenance, [facility]: [facility], keep on hold, 1mg injection if pt unable to safely consume glucose  Glucose tabs: Glucose tabs, See Instructions, Instructions: use as needed for hypoglycemia, # 100 EA, 3 Refill(s), Type: Maintenance, Pharmacy: [facility]  NovoLOG 100 units/mL injectable solution: See Instructions, Instructions: INJECT UP TO 70 UNITS DAILY VIA PUMP-  Dx E10.9, # 90 mL, 3 Refill(s), Type:  Maintenance, Pharmacy: [facility], INJECT UP TO 70 UNITS DAILY VIA PUMP-  Dx E10.9  ULTRA-FINE III SHORT PEN NEEDL MG INJECTABLE: ULTRA-FINE III SHORT PEN NEEDL MG INJECTABLE, See Instructions, Instructions: injects 4 times daily, # 400 EA, 11 Refill(s), Pharmacy: [id number]  Zithromax Z-Aaron 250 mg oral tablet: Take 2 tablets on Day 1 and then 1 tablet, Oral, daily, Instructions: until finished, # 6 tab(s), 0 Refill(s), Type: Maintenance, Pharmacy: [facility], Take 2 tablets on Day 1 and then 1 tablet Oral daily,Instr:until finished  aspirin 81 mg oral tablet: 1 tab(s) ( 81 mg ), PO, Daily, # 30 tab(s), 0 Refill(s), Type: Maintenance, OTC (Rx)  azithromycin 250 mg oral tablet: = 1 packet(s), Oral, once, Instructions: as directed on package labeling, # 6 tab(s), 0 Refill(s), Type: Soft Stop, Pharmacy: [id number], 1 packet(s) Oral once,Instr:as directed on package labeling  levothyroxine 200 mcg (0.2 mg) oral tablet: = 1 tab(s) ( 200 mcg ), po, daily, # 90 tab(s), 1 Refill(s), Type: Maintenance, Pharmacy: [facility], keep on hold and pt will notify when needed, 1 tab(s) Oral daily  pravastatin 20 mg oral tablet: = 1 tab(s) ( 20 mg ), Oral, daily, # 90 tab(s), 0 Refill(s), Type: Maintenance, Pharmacy: [facility]   Problem list:    All Problems  Intolerance of drug / SNOMED CT 11249079 / Confirmed  Essential tremor / SNOMED CT 1839666059 / Confirmed  Hyperlipidemia / SNOMED CT 21071554 / Confirmed  Hypothyroid / SNOMED CT 12855589 / Confirmed  Insulin pump in place / SNOMED CT 2379403473 / Confirmed  Diabetes mellitus type 1 / SNOMED CT 643380252 / Confirmed  Canceled: Long term (current) use of insulin / SNOMED CT 150606851      Histories   Past Medical History:    Active  Hyperlipidemia (47163720)  Hypothyroid (67632960)  Comments:  1/27/2010 CST 1:40 PM Kassi Gonzalez CMA  2007  Diabetes mellitus type 1 (456887649)  Comments:  1/27/2010 CST 1:41 PM GARCIA Link CMA  "Kassi  2007  Essential tremor (0625258688)   Family History:    Diabetes mellitus type I  Father ()  Bowel obstruction  Father ()  [place] - Cancer of uterus  Mother ()     Procedure history:    Colonoscopy (SNOMED CT 714023651) performed by Mikhail Dawson MD on 2019 at 74 Years.  Comments:  2020 8:52 AM CDT - Marianna Villanueva  Indication: Positive Cologuard.             Sedation: MAC.    2019 9:23 AM CDT - Malika Joya CMA  Tubular adenoma - negative for high grade dysplasia - Repeat in 3years  Screening for colon cancer (SNOMED CT 2619487631) performed by Kb Mcqueen MD on 2019 at 74 Years.  Comments:  6/3/2019 4:46 PM CDT - Yoanna Danielson MA  Cologuaashley result:  positive  Recommendation:  needs colonoscopy  Screening for malignant neoplasm of colon (SNOMED CT 9737871813) on 2015 at 70 Years.  Comments:  2016 8:15 AM CST - Adali Pantoja  Colnagi is negative  Sialogram (SNOMED CT 823120369) on 2006 at 60 Years.  Comments:  3/29/2013 10:58 AM CDT - Yoanna Porras  \"Stone.  Swollen gland.\"  removal of stone, left mandibular gland in the month of 2005 at 60 Years.  Sigmoidoscopy (SNOMED CT 21288814) performed by Sky Bui MD on 10/17/2002 at 57 Years.  Insulin pump, device (SNOMED CT 0446879163).   Social History:        Alcohol Assessment            1-2 times per month, 1 drinks/episode average.      Tobacco Assessment            Former smoker, quit more than 30 days ago, Cigarettes      Employment and Education Assessment            Retired, Work/School description: /, [employer].  Previous               employment/school: [employer].      Home and Environment Assessment            Marital status: .  Spouse/Partner name: Vicenta.      Exercise and Physical Activity Assessment            Exercise frequency: Daily.  Exercise type: Aerobics, Walking, Weight lifting.      "   Physical Examination   VS/Measurements      Review / Management   Results review      Impression and Plan   Diagnosis     Diabetes mellitus type 1 (CAJ67-CO E10.9).     Hyperlipidemia NOS (MQE93-CX E11.69).     Overweight (BMI 25.0-29.9) (CIH31-VC E66.3).       Counseled: Patient, Further instruction on AADE7 Self Care Behaviors  Discussed living well with diabetes, further information about diabetes disease process, reviewed chronic condition and appropriate treatment and self management  Healthy Eating - Large focus on accuracy of carbohydrate counting and decreasing carb load at meals/ dispersing them throughout the day.  Pt admits he likes to eat large portions.  Pt has calorie sander at home and will also start to measure food and read labels.  Reviewed one way of trying to reduce glycemic variability through following a carbohydrate controlled meal plan.  Pt given meal planning ideas to incorporate dietary recommendations,  Education review on decreasing cardiovascular risk with following Therapeutic Lifestyle Changes (TLC) nutrition plan for heart healthy eating and healthy eating through the stay at home recommendations  Being Active - Education on how exercise helps with : use temporary basal for exercise   - lowering BG by increasing the muscles ability to take up and use glucose   - weight loss   - healthier heart (improve lipid profile)   - improve sleep, mood, energy   - decrease stress      Monitoring - Reviewed recommended testing schedule and blood glucose target levels.  Test prior to every meal.  Discussed options for Dexcom G6 CGMS as pt would be an excellent candidate, pt declines at this time as he does not want anything else attached to him.    Taking Medication - on NovoLog   Problem Solving -  Pump settings adjusted   Reviewed Pump settings (basal/bolus), menu options and use, bolus wizard suspend, temp basal BG testing recommendations, hypo/hyperglycemia guidelines, infusion set changes and  fill and DKA prevention.  medical support team assistance, resources provided (written); good control of stress, pump adjustments made today slightly but elevated reading and low readings likely related to user recommended routine changes.  Bolus early 20+ min prior to meals, test BG prior to ALL meals, bolus for ALL snacks >5gm CHO, use temp basal for exercise  Healthy Coping - support of friends, family, and medical support team   Reducing Risks - Detailed education on potential complications associated with uncontrolled diabetes and prevention recommendations.  Recommendations include: annual eye exam, good oral cares with brushing BID and flossing daily, routine dental apts. good foot care tips and shoe wear - discussed monofilament test yearly.  Importance of adequate sleep and good control of BG to prevent developing complications related to uncontrolled DM including nephropathy, neuropathy, retinopathy, and cardiovascular disease.  Weight loss goal of 7 % of current body weight pounds as a reasonable goal to help increase insulin sensitivity      Pt able to verbalize understanding of above education, handouts provided for additional resources.       Goals:   1.  Practice healthy stress management and get good quality sleep with the goal of 7-8 hours per night.    2.   Physical activity: Recommend 30 min of physical activity on 5 or more days/ week.    3.  Eat in a healthy way, per diabetic plate method.  Nutrition reference: Eat 3 meals a day; snacks are optional.  A meal is three or more food groups; make it colorful for better nutrition.    4. Count carbohydrates and use bolus wizard for all meals and snacks bolus 15-20min prior to eating   5.  BG testing4+x/ day  Goal pre-meals 75 - 120; 2 hrs after meals 75 - 140   6.  Be aware of s/sx of hypo/ hyperglycemia and follow treatment protocol   7.  Always have back up pump supplies on hand and vial/ syringe with battery and glucose tablets   8. Video/ Telemed  in ~ 3mo       Professional Services   Call start time 9:15  Call end time 9:49  Total time on call 33 minutes   cc Dr. Kb Mcqueen

## 2022-02-15 NOTE — TELEPHONE ENCOUNTER
---------------------  From: Alverto ARDON, Litzy BOOTHE (Phone Messages [place] ([id number]_[facility]))   To: ROSALIO ANDRADE    Sent: 6/14/2019 1:17:11 PM CDT  Subject: CONSUMER MESSAGE FW: Colonoscopy     Hi Malika Gayle and Dr. Mcqueen are out of clinic today. Is there something that I can try to assist you with?    Litzy LAU LPN        ---------------------  From: ROSALIO ANDRADE  To: [facility]  Sent: 06/14/2019 01:04 p.m. CDT  Subject: Colonoscopy  Hi Malika Joya,  Would you give me a call  sometime this afternoon regarding scheduling a colonoscopy? [phone] or my cell, [phone].  Thanks,  Rosalio Andrade.

## 2022-02-15 NOTE — NURSING NOTE
Quick Intake Entered On:  9/20/2019 10:07 AM CDT    Performed On:  9/20/2019 10:06 AM CDT by Malika Joya CMA               Summary   Height Measured :   68.5 in(Converted to: 5 ft 8 in, 173.99 cm)    Systolic Blood Pressure :   130 mmHg   Diastolic Blood Pressure :   60 mmHg   Mean Arterial Pressure :   83 mmHg   Malika Joya CMA - 9/20/2019 10:06 AM CDT

## 2022-02-15 NOTE — NURSING NOTE
Comprehensive Intake Entered On:  5/19/2020 8:13 AM CDT    Performed On:  5/19/2020 8:11 AM CDT by Malika Joya CMA               Summary   Chief Complaint :   f/u chronic - verbal consent given for video visit    Height Measured :   68.5 in(Converted to: 5 ft 8 in, 173.99 cm)    Malika Joya CMA - 5/19/2020 8:11 AM CDT   Health Status   Allergies Verified? :   Yes   Medication History Verified? :   Yes   Medical History Verified? :   Yes   Pre-Visit Planning Status :   Completed   Tobacco Use? :   Former smoker   Malika Joya CMA - 5/19/2020 8:11 AM CDT   ID Risk Screen   Recent Travel History :   No recent travel   Family Member Travel History :   No recent travel   Other Exposure to Infectious Disease :   Unknown   Malika Joya CMA - 5/19/2020 8:11 AM CDT

## 2022-02-15 NOTE — TELEPHONE ENCOUNTER
---------------------  From: Anisha Bell RN (Phone Messages Pool ([id number]_[facility]))   To: [facility] Message Pool ([id number]_[place] [facility] [place]);     Sent: 12/28/2021 8:01:16 AM CST  Subject: CONSUMER MESSAGE  FW: Insurance referral           ---------------------  From: ROSALIO MALLOY  To: [facility]  Sent: 12/27/2021 10:17 p.m. CST  Subject: Insurance referral  Dr. Kb Mcqueen/Malika Joya.....Today I fell in the ice and and landed on my back.  It is very stiff and sore.  I am able to walk slowly and bending over is difficult.  My concern is I am flying to [place] on Friday.  I would like to have physical therapy before I leave.  Would you please send an insurance referral over to [facility], [place].  Thank you,  Rosalio Malloy---------------------  From: Malika Joya CMA ([facility] Message Pool ([id number]_[facility]))   To: ROSALIO MALLOY    Sent: 12/28/2021 9:28:55 AM CST  Subject: RE: CONSUMER MESSAGE  FW: Insurance referral     talked with Rosalio lee and landed on the curb injuring his mid back.  Has an appt for 3p today with [facility], referral sent

## 2022-02-15 NOTE — TELEPHONE ENCOUNTER
---------------------  From: Mindy ALFARO, Mari LUGO (Phone Messages Pool ([id number]_[facility]))   To: [address] Pool ([id number]_[facility]) ;     Sent: 11/23/2020 11:38:27 AM CST  Subject: Consumer message: Lab           ---------------------  From: ROSALIO ANDRADE  To: [facility]  Sent: 11/23/2020 11:36 a.m. CST  Subject: Lab  Dear Malika Joya,  Is my Lab, scheduled for 11/24/2020, fasting or nonfasting?  Thanks,  Rosalio Andrade.---------------------  From: Jeannie Mayers RN (Unit [facility] Pool ([id number]_[facility]) )   To: ROSALIO ANDRADE    Sent: 11/23/2020 3:13:25 PM CST  Subject: FW: Consumer message: Lab     Kimmie Gayle,    Please fast for your lab appointment on 11/24/20. We are checking your cholesterol, Hgb A1c and TSH. You may have water.    Thank you,    Jeannie PISANO RN

## 2022-02-15 NOTE — NURSING NOTE
Comprehensive Intake Entered On:  6/2/2021 1:44 PM CDT    Performed On:  6/2/2021 1:41 PM CDT by Malika Joya CMA               Summary   Chief Complaint :   1.  check left wrist - had a fall in March while in [place], landed on out stretched hand   2.  ? suture left from Rotator cuff repair in May    Weight Measured :   183.5 lb(Converted to: 183 lb 8 oz, 83.234 kg)    Height Measured :   67 in(Converted to: 5 ft 7 in, 170.18 cm)    Body Mass Index :   28.74 kg/m2 (HI)    Body Surface Area :   1.98 m2   Height/Length Estimated :   73 cm(Converted to: 2 ft 5 in, 28.74 in)    Systolic Blood Pressure :   132 mmHg (HI)    Diastolic Blood Pressure :   78 mmHg   Mean Arterial Pressure :   96 mmHg   Peripheral Pulse Rate :   81 bpm   Temperature Tympanic :   98.3 DegF(Converted to: 36.8 DegC)    Oxygen Saturation :   99 %   Malika Joya CMA - 6/2/2021 1:41 PM CDT   Health Status   Allergies Verified? :   Yes   Medication History Verified? :   Yes   Medical History Verified? :   Yes   Pre-Visit Planning Status :   Completed   Tobacco Use? :   Former smoker   Malika Joya CMA - 6/2/2021 1:41 PM CDT   ID Risk Screen   Recent Travel History :   No recent travel   Family Member Travel History :   No recent travel   Other Exposure to Infectious Disease :   Unknown   COVID-19 Testing Status :   No positive COVID-19 test   Malika Joya CMA - 6/2/2021 1:41 PM CDT   Mota Fall Risk   History of Falls in Last 3 Months Mota :   Yes   Malika Joya CMA - 6/2/2021 1:41 PM CDT   Social History   Social History   (As Of: 6/2/2021 1:44:46 PM CDT)   Alcohol:        1-2 times per month, 1 drinks/episode average.   (Last Updated: 11/25/2014 9:46:31 AM CST by Malika Joya CMA)          Tobacco:        Former smoker, quit more than 30 days ago, Cigarettes   (Last Updated: 12/1/2020 8:35:13 AM CST by Malika Joya CMA)          Electronic Cigarette/Vaping:        Electronic Cigarette Use: Never.   (Last Updated: 12/1/2020 8:35:17 AM CST by  Jessica Joya CMA          Employment/School:        Retired, Work/School description: /, [employer].  Previous employment/school: [employer].   (Last Updated: 3/29/2013 11:08:49 AM CDT by Yoanna Porras)          Home/Environment:        Marital status: .  Spouse/Partner name: Vicenta.   (Last Updated: 3/29/2013 11:02:41 AM CDT by Yoanna Porras)          Exercise:        Exercise frequency: Daily.  Exercise type: Aerobics, Walking, Weight lifting.   (Last Updated: 12/20/2016 1:13:09 PM CST by Sonia Cleary)

## 2022-02-15 NOTE — PROGRESS NOTES
Patient:   ROSALIO ANDRADE            MRN: [id number]            FIN: [id number]               Age:   75 years     Sex:  Male     :  1945   Associated Diagnoses:   Diabetes mellitus type 1; Hyperlipidemia NOS; Overweight (BMI 25.0-29.9)   Author:   Rupal White      Visit Information   Visit type:  Diabetes Self Management Education .    Referral source:  Richar RIZZO, Kb.       Chief Complaint   Uncontrolled DM Type 1 (ROLANDO), Hyperlipidemia, Overweight       History of Present Illness   Discussed nutrition, diabetes, and lifestyle management with pt.  Pt started Medtronic 630G pump on 17.  Pt is not interested in a CGMS system at this time as it is not being covered at 100% cost.      a1c 8.4% = 195 eAG above pt's personal target of <8% and higher than ADA goal of <7%.    Nutrition:  Avg daily carbs 191  Carb intake 59 - 139+gm at one time - higher than recommendations.  Encourage spreading carbs out throughout the day to help decrease post prandial glycemic excursions.    Typical breakfast cereal with milk, whole banana, toast, vegetable juice - has not changed   Working on trying to bolus 15 min prior to eating     Physical Activity:  [facility], swimming and weights   Stress:   moderate  Sleep: good  Support: wife  Insulin:  Novolog via pump 51.03 +/- 4.1u/ day   BG Testing: ~ 3.8x/ day   Routine diabetes care:  up to date with eye (2020), dental, and foot exams - no concerns    BG avg HIGHER 227 +/- 96 mg/dL (significant variability)  Avg Daily Basal 17.15 (34%)  Avg Daily Bolus 33.68 (66%)  8% (4 readings below 70mg/dL)     ICR=   12:00     7.5   15:00     7.5     Sensitivity=  0:00  45    Changed to 40  6:30  42    Changed to 40  20:30  45  Changed to 40    Target     120 - 120     Basal:  17.188  increased to 19.275  MN   0.600  0.675  3am   0.600  0.675  6:30am  0.775  0.850  13:00  0.750  0.850      Insulin action  3.5 hours  Max Bolus    17u  Max Basal   1u/ hr        Health Status   Allergies:    Nonallergic Reactions (Selected)  Severity Not Documented  Atorvastatin (Joint pain)   Medications:  (Selected)   Prescriptions  Prescribed  CONTOUR NEXT TEST STRIP: See Instructions, Instructions: TEST 4 TIMES A DAY, # 400 unknown unit, 3 Refill(s), Type: Maintenance, Pharmacy: [facility] [id number], TEST 4 TIMES A DAY  CONTOUR NEXT TEST STRIPS: CONTOUR NEXT TEST STRIPS, See Instructions, Instructions: pt testing 4 times daily- pt has a pump and needing to test more often due to fluctuating BS's - Dx - Z96.41 and E10.9, Supply, # 400 EA, 3 Refill(s), Type: Maintenance, Pharmacy: [facility]  Glucagon Emergency Kit for Low Blood Sugar 1 mg injection: See Instructions, Instructions: 1mg injection if pt unable to safely consume glucose, # 1 EA, 0 Refill(s), Type: Maintenance, Pharmacy: [facility], keep on hold, 1mg injection if pt unable to safely consume glucose  Glucose tabs: Glucose tabs, See Instructions, Instructions: use as needed for hypoglycemia, # 100 EA, 3 Refill(s), Type: Maintenance, Pharmacy: [facility]  NovoLOG 100 units/mL injectable solution: See Instructions, Instructions: INJECT UP TO 70 UNITS DAILY VIA PUMP-  Dx E10.9, # 90 mL, 3 Refill(s), Type: Maintenance, Pharmacy: [facility], INJECT UP TO 70 UNITS DAILY VIA PUMP-  Dx E10.9  ULTRA-FINE III SHORT PEN NEEDL MG INJECTABLE: ULTRA-FINE III SHORT PEN NEEDL MG INJECTABLE, See Instructions, Instructions: injects 4 times daily, # 400 EA, 11 Refill(s), Pharmacy: [facility] [id number]  aspirin 81 mg oral tablet: 1 tab(s) ( 81 mg ), PO, Daily, # 30 tab(s), 0 Refill(s), Type: Maintenance, OTC (Rx)  levothyroxine 175 mcg (0.175 mg) oral tablet: = 1 tab(s) ( 175 mcg ), Oral, daily, # 90 tab(s), 1 Refill(s), Type: Maintenance, Pharmacy: [facility], 1 tab(s) Oral daily  pravastatin 20 mg oral tablet: = 1 tab(s) ( 20 mg ), Oral, daily, # 90 tab(s), 1 Refill(s), Type: Maintenance, Pharmacy: [facility], 1 tab(s) Oral daily  Documented Medications  Documented  Metamucil: Oral, 0 Refill(s), Type: Maintenance,    Medications          *denotes recorded medication          ULTRA-FINE III SHORT PEN NEEDL MG INJECTABLE: See Instructions, injects 4 times daily, 400 EA.          CONTOUR NEXT TEST STRIP: See Instructions, TEST 4 TIMES A DAY, 400 unknown unit, 3 Refill(s).          CONTOUR NEXT TEST STRIPS: See Instructions, pt testing 4 times daily- pt has a pump and needing to test more often due to fluctuating BS's - Dx - Z96.41 and E10.9, 400 EA, 3 Refill(s).          Glucose tabs: See Instructions, use as needed for hypoglycemia, 100 EA.          aspirin 81 mg oral tablet: 81 mg, 1 tab(s), PO, Daily, 30 tab(s).          Glucagon Emergency Kit for Low Blood Sugar 1 mg injection: See Instructions, 1mg injection if pt unable to safely consume glucose, 1 EA, 0 Refill(s).          NovoLOG 100 units/mL injectable solution: See Instructions, INJECT UP TO 70 UNITS DAILY VIA PUMP-  Dx E10.9, 90 mL, 3 Refill(s).          levothyroxine 175 mcg (0.175 mg) oral tablet: 175 mcg, 1 tab(s), Oral, daily, 90 tab(s), 1 Refill(s).          pravastatin 20 mg oral tablet: 20 mg, 1 tab(s), Oral, daily, 90 tab(s), 1 Refill(s).          *Metamucil: Oral, 0 Refill(s).       Problem list:    All Problems  Intolerance of drug / SNOMED CT 15186824 / Confirmed  Essential tremor / SNOMED CT 2519594694 / Confirmed  Hyperlipidemia / SNOMED CT 68028768 / Confirmed  Hypothyroid / SNOMED CT 11233504 / Confirmed  Insulin pump in place / SNOMED CT 3870732832 / Confirmed  Diabetes mellitus type 1 / SNOMED CT 886058022 / Confirmed  Canceled: Long term (current) use of insulin / SNOMED CT 094977406      Histories   Past Medical History:    Active  Hyperlipidemia (74605814)  Hypothyroid (02140830)  Comments:  1/27/2010 CST 1:40 PM Kassi Gonzalez CMA  2007  Diabetes mellitus type 1 (455290802)  Comments:  1/27/2010 CST 1:41 PM GARCIA Link CMA  "Kassi  2007  Essential tremor (4721170918)   Family History:    Diabetes mellitus type I  Father ()  Bowel obstruction  Father ()  [place] - Cancer of uterus  Mother ()     Procedure history:    Colonoscopy (SNOMED CT 709908121) performed by Mikhail Dawson MD on 2019 at 74 Years.  Comments:  2020 8:52 AM CDT - Marianna Villanueva  Indication: Positive Cologuard.             Sedation: MAC.    2019 9:23 AM CDT - Malika Joya CMA  Tubular adenoma - negative for high grade dysplasia - Repeat in 3years  Screening for colon cancer (SNOMED CT 2972955221) performed by Kb Mcqueen MD on 2019 at 74 Years.  Comments:  6/3/2019 4:46 PM CDT - Yoanna Danielson MA  Cologuaashley result:  positive  Recommendation:  needs colonoscopy  Screening for malignant neoplasm of colon (SNOMED CT 6669331393) on 2015 at 70 Years.  Comments:  2016 8:15 AM CST - Adali Pantoja  Colnagi is negative  Sialogram (SNOMED CT 816085993) on 2006 at 60 Years.  Comments:  3/29/2013 10:58 AM CDT - Yoanna Porras  \"Stone.  Swollen gland.\"  removal of stone, left mandibular gland in the month of 2005 at 60 Years.  Sigmoidoscopy (SNOMED CT 33934401) performed by Sky Bui MD on 10/17/2002 at 57 Years.  Insulin pump, device (SNOMED CT 1421140502).   Social History:        Alcohol Assessment            1-2 times per month, 1 drinks/episode average.      Tobacco Assessment            Former smoker, quit more than 30 days ago, Cigarettes      Employment and Education Assessment            Retired, Work/School description: /, [employer].  Previous               employment/school: [employer].      Home and Environment Assessment            Marital status: .  Spouse/Partner name: Vicenta.      Exercise and Physical Activity Assessment            Exercise frequency: Daily.  Exercise type: Aerobics, Walking, Weight lifting.      "   Physical Examination   Measurements from flowsheet : Measurements   7/9/2020 10:12 AM CDT Height Measured - Standard 68.5 in    Height/Length Estimated 73 cm    Weight Measured - Standard 186.6 lb    Weight Estimated 129 kg    BSA 2.02 m2    BSA Estimated 1.73 m2    Body Mass Index 27.96 kg/m2  HI         Review / Management   Results review:  Lab results   5/5/2020 3:28 PM CDT Sodium Level 136 mmol/L    Potassium Level 4.0 mmol/L    Chloride Level 100 mmol/L    CO2 Level 30 mmol/L    Glucose Level 146 mg/dL  HI    BUN 18 mg/dL    Creatinine 1.00 mg/dL    BUN/Creat Ratio NOT APPLICABLE    eGFR 74 mL/min/1.73m2    eGFR African American 86 mL/min/1.73m2    Calcium Level 9.4 mg/dL    Hgb A1c 8.4  HI    TSH 0.08 mIU/L  LOW    U Microalbumin 0.2 mg/dL    Ur Creatinine 74 mg/dL    Ur Microalb/Creat Ratio 3   .       Impression and Plan   Diagnosis     Diabetes mellitus type 1 (GVN23-EU E10.9).     Hyperlipidemia NOS (TPN92-WL E11.69).     Overweight (BMI 25.0-29.9) (LDT13-LC E66.3).       Counseled: Patient, Further instruction on AADE7 Self Care Behaviors  Discussed living well with diabetes, further information about diabetes disease process, reviewed chronic condition and appropriate treatment and self management  Healthy Eating - Large focus on accuracy of carbohydrate counting and decreasing carb load at meals/ dispersing them throughout the day - pt admits he likes to eat large portions.  Reviewed one way of trying to reduce glycemic variability through following a carbohydrate controlled meal plan - pt not on board with this yet.  Pt given meal planning ideas to incorporate dietary recommendations,  Education review on decreasing cardiovascular risk with following Therapeutic Lifestyle Changes (TLC) nutrition plan for heart healthy eating and healthy eating through the stay at home recommendations  Being Active - Education on how exercise helps with : use temporary basal for exercise   - lowering BG by increasing  the muscles ability to take up and use glucose   - weight loss   - healthier heart (improve lipid profile)   - improve sleep, mood, energy   - decrease stress      Monitoring - Reviewed recommended testing schedule and blood glucose target levels.  Test prior to every meal.  Discussed options for Dexcom G6 CGMS as pt would be an excellent candidate, pt declines at this time as he does not want anything else attached to him.    Taking Medication - on NovoLog   Problem Solving -  Pump settings adjusted   Reviewed Pump settings (basal/bolus), menu options and use, bolus wizard suspend, temp basal BG testing recommendations, hypo/hyperglycemia guidelines, infusion set changes and fill and DKA prevention.  medical support team assistance, resources provided (written); good control of stress, pump adjustments made today slightly but elevated reading and low readings likely related to user recommended routine changes.  Bolus early 20+ min prior to meals, test BG prior to ALL meals, bolus for ALL snacks >5gm CHO, use temp basal for exercise  Healthy Coping - support of friends, family, and medical support team   Reducing Risks - Detailed education on potential complications associated with uncontrolled diabetes and prevention recommendations.  Recommendations include: annual eye exam, good oral cares with brushing BID and flossing daily, routine dental apts. good foot care tips and shoe wear - discussed monofilament test yearly.  Importance of adequate sleep and good control of BG to prevent developing complications related to uncontrolled DM including nephropathy, neuropathy, retinopathy, and cardiovascular disease.  Weight loss goal of 7 % of current body weight pounds as a reasonable goal to help increase insulin sensitivity      Pt able to verbalize understanding of above education, handouts provided for additional resources.       Goals:   1.  Practice healthy stress management and get good quality sleep with the goal  of 7-8 hours per night.    2.   Physical activity: Recommend 30 min of physical activity on 5 or more days/ week.    3.  Eat in a healthy way, per diabetic plate method.  Nutrition reference: Eat 3 meals a day; snacks are optional.  A meal is three or more food groups; make it colorful for better nutrition.    4. Count carbohydrates and use bolus wizard for all meals and snacks bolus 15-20min prior to eating   5.  BG testing4+x/ day  Goal pre-meals 75 - 120; 2 hrs after meals 75 - 140   6.  Be aware of s/sx of hypo/ hyperglycemia and follow treatment protocol   7.  Always have back up pump supplies on hand and vial/ syringe with battery and glucose tablets   8. Follow up in 3 mo      Professional Services   Time spent with pt 60 min   cc Dr. Kb Mcqueen

## 2022-02-15 NOTE — TELEPHONE ENCOUNTER
---------------------  From: Adriana Flores (Phone Messages Pool ([id number]_[facility]))   To: ROSALIO ANDRADE    Sent: 10/1/2019 3:47:11 PM CDT  Subject: FW: Refill Novolog     Hi Rosalio!    It looks like refill was sent on 9/20/2019 with 4 refills. Have you checked with your pharmacy yet?   Let us know if there is anything else we can do for you!    KLEVER Wright         ---------------------  From: ROSALIO ANDRADE  To: [facility]  Sent: 10/01/2019 03:43 p.m. CDT  Subject: Refill Novolog  Hi Malika Joya,  May I have a new refill on my prescription Novolog then sent to [facility]?I    Thanks,  Rosalio Andrade.

## 2022-02-15 NOTE — NURSING NOTE
Pt appears on [facility] chronic disease list out of parameters for A1c date of 4/26/19. RTC is in place for 9/2019 for labs/visit- will check A1c then.

## 2022-02-15 NOTE — TELEPHONE ENCOUNTER
---------------------  From: Alannah Way LPN (Phone Messages Pool ([id number]_[facility]))   To: [facility] Message Pool ([id number]_[facility]); ROSALIO ANDRADE    Sent: 6/24/2019 10:19:42 AM CDT  Subject: FW: Colonoscopy     Merrill Gayle,  I will forward your concerns onto Dr. Mcqueen for tomorrow when he is in clinic, but if you want to contact the facility ([facility]) where procedure is being to done to see what they usually recommend you are more than welcome.    ThanksAlannah       ---------------------  From: ROSALIO ANDRADE  To: [facility]  Sent: 06/24/2019 09:19 a.m. CDT  Subject: Colonoscopy  Malika Joya/Shereen,  I am scheduled to have a colonoscopy on Thursday, June 27. I need advise regarding insulin/ glucose use during the prep day and the time of the procedure.  Thanks,  Rosalio Andrade.---------------------  From: Malika Joya CMA ([facility] Message Pool ([id number]_[facility]))   To: Kb Mcqueen MD;     Sent: 6/25/2019 7:28:09 AM CDT  Subject: FW: Colonoscopy---------------------  From: Kb Mcqueen MD   To: [facility] Message Pool ([id number]_[place] [facility] [place]);     Sent: 6/25/2019 8:00:18 AM CDT  Subject: RE: Colonoscopy     I would keep pump on and allow to run in usual basal mode.  No bolusing of insulin on morning of procedure while fasting.  Hospital will check blood sugar during pre-op check.  If any issues with hypoglycemia, I would have you turn pump off for procedure (I don't anticipate this being an issue).---------------------  From: Malika Joya CMA ([facility] Message Pool ([id number]_[facility]))   To: ROSALIO ANDRADE    Sent: 6/25/2019 8:18:18 AM CDT  Subject: FW: Colonoscopy     Hi Rosalio,  Please see Dr Mcqueen's response and let us know if you have any questions.  Malika

## 2022-02-15 NOTE — NURSING NOTE
Depression Screening Entered On:  5/7/2019 12:00 PM CDT    Performed On:  5/3/2019 12:00 PM CDT by Malika Joya CMA               Depression Screening   Little Interest - Pleasure in Activities :   Not at all   Feeling Down, Depressed, Hopeless :   Not at all   Initial Depression Screen Score :   0    Trouble Falling or Staying Asleep :   Not at all   Feeling Tired or Little Energy :   Not at all   Poor Appetite or Overeating :   Not at all   Feeling Bad About Yourself :   Not at all   Trouble Concentrating :   Not at all   Moving or Speaking Slowly :   Not at all   Thoughts Better Off Dead or Hurting Self :   Not at all   Detailed Depression Screen Score :   0    Total Depression Screen Score :   0    SULEMAN Difficulty with Work, Home, Others :   Not difficult at all   Malika Joya CMA - 5/7/2019 12:00 PM CDT

## 2022-02-15 NOTE — TELEPHONE ENCOUNTER
---------------------  From: Emely Abraham RN ([facility] Pool ([id number]   To: ROSALIO ANDRADE    Sent: 3/30/2021 5:09:37 PM CDT  Subject: RE: Injured shoulder     Merrill Gayle -    Dr. Mcqueen is out of clinic this week. I would advise an appointment with another provider to assess your shoulder and possibly place a referral. We have several providers in clinic this week that would be happy to see you for an appointment.    Thanks,  Emely SARAVIA RN       ---------------------  From: ROSALIO ANDRADE  To: [facility]  Sent: 03/30/2021 05:03 p.m. CDT  Subject: Injured shoulder  Dear Malika Joya,  Three weeks ago I fell and hurt my left shoulder. We just returned from [place] and I got a quick evaluation from Nate Chand and he didn t think it was a Rotator Cuff injury or anything really serious. He recommended  some therapy sessions. I would like Dr Mcqueen to write a prescription for physical therapy with Nate. Please advise.  Thanks,  Rosalio Andrade.

## 2022-02-15 NOTE — TELEPHONE ENCOUNTER
---------------------  From: Kb Mcqueen MD   To: ROSALIO ANDRADE    Sent: 11/25/2020 4:00:52 PM CST  Subject: General Message      Labs for your review.  We can discuss in more detail at future clinic visit.       Results:  Date Result Name Ind Value Ref Range   11/24/2020 8:33 AM Hgb A1c ((H)) 8.2 ( - <5.7)   11/24/2020 8:33 AM Cholesterol ((H)) 211 mg/dL ( - <200)   11/24/2020 8:33 AM Non-HDL Cholesterol  125 ( - <130)   11/24/2020 8:33 AM HDL  86 mg/dL (> OR = 40 - )   11/24/2020 8:33 AM Cholesterol/HDL Ratio  2.5 ( - <5.0)   11/24/2020 8:33 AM LDL ((H)) 111    11/24/2020 8:33 AM Triglyceride  58 mg/dL ( - <150)   11/24/2020 8:33 AM TSH ((L)) 0.25 mIU/L (0.40 - 4.50)   10/5/2021    Forest Zamudio (:  2018) is a 3 y.o. female, here for evaluation of the following medical concerns:    Chief Complaint   Patient presents with    Conjunctivitis     Pt has drainage coming out of both eyes and they are both pink x 1 day. Joyce Rossi is here with mom and brother, mom is reporting pt woke with crusty \"eyes\" yesterday left eye and continued this morning into the right eye. 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 Mom denies change in diet/routine, URI s/s, f/d/v. Has tried nothing for relief. States did purchase and otc pink eye medication but did not try it as of yet. Conjunctivitis   The current episode started yesterday. The onset was sudden. The problem has been gradually worsening. Nothing relieves the symptoms. Nothing aggravates the symptoms. Associated symptoms include eye itching, eye discharge and eye redness. Pertinent negatives include no fever, no decreased vision, no double vision, no abdominal pain, no constipation, no diarrhea, no nausea, no vomiting, no ear pain, no rhinorrhea, no sore throat, no cough, no URI, no rash and no eye pain. Both eyes are affected. Past Medical History:   Diagnosis Date    Hemangioma        Patient Active Problem List   Diagnosis     , gestational age 35 completed weeks    Single liveborn infant delivered vaginally    SGA (small for gestational age)    RDS (respiratory distress syndrome in the )    Feeding difficulties    Hemangioma of eyelid    Hemangioma of other sites    Muscle tone increased    Personal history of prematurity    Screening for developmental handicaps in early childhood       Review of Systems   Constitutional: Negative for activity change, appetite change, chills, fever and irritability. HENT: Negative for ear pain, rhinorrhea and sore throat. Eyes: Positive for discharge, redness and itching. Negative for double vision and pain.    Respiratory: Negative for cough. Gastrointestinal: Negative for abdominal pain, constipation, diarrhea, nausea and vomiting. Musculoskeletal: Negative. Skin: Negative for rash. Prior to Visit Medications    Medication Sig Taking? Authorizing Provider   trimethoprim-polymyxin b (POLYTRIM) 59312-1.1 UNIT/ML-% ophthalmic solution Place 1 drop into both eyes every 4 hours for 10 days Yes ARRON Blanton - CNP        No Known Allergies    History reviewed. No pertinent surgical history. History reviewed. No pertinent family history. Vitals:    10/05/21 1252   BP: 98/64   Site: Left Upper Arm   Position: Sitting   Cuff Size: Small Adult   Pulse: 88   Temp: 98.8 °F (37.1 °C)   TempSrc: Temporal   SpO2: 100%   Weight: 24 lb 3.2 oz (11 kg)     Estimated body mass index is 9.07 kg/m² as calculated from the following:    Height as of 6/20/19: 15.35\" (39 cm). Weight as of 6/20/19: 3 lb 0.7 oz (1.38 kg). Physical Exam  Eyes:      General: Lids are normal. Lids are everted, no foreign bodies appreciated. Right eye: Discharge ( yellow/green) present. Conjunctiva/sclera:      Right eye: Right conjunctiva is injected. Left eye: Left conjunctiva is injected. Pupils: Pupils are equal, round, and reactive to light. ASSESSMENT/PLAN:  Forest was seen today for conjunctivitis. Diagnoses and all orders for this visit:    Acute bacterial conjunctivitis, unspecified laterality  -     trimethoprim-polymyxin b (POLYTRIM) 98681-5.1 UNIT/ML-% ophthalmic solution; Place 1 drop into both eyes every 4 hours for 10 days  - handouts provided  -Discussed red flags need for emergent care      Patient/caregiver given educational handouts and has had all questions answered. Patient/caregiver voices understanding and agrees to plans along with risks and benefits of plan. Patient/caregiver is instructed to continue prior meds, diet, and exercise plans as instructed.  Patient/caregiver agrees to follow up as instructed and sooner if needed. Patient/caregiver agrees to go to ER if condition becomes emergent. Return in about 2 days (around 10/7/2021), or if symptoms worsen or fail to improve. An  electronic signature was used to authenticate this note. ARRON Yarbrough CNP on 10/5/2021 at 1:24 PM    This dictation was performed with a verbal recognition program [facility]) and it was checked for errors. It is possible that there are still dictated errors within this office note. If so, please bring any errors to my attention for an addendum. All efforts were made to ensure that this office note is accurate.

## 2022-02-15 NOTE — PROGRESS NOTES
Patient:   ROSALIO ANDRADE            MRN: [id number]            FIN: [id number]               Age:   75 years     Sex:  Male     :  1945   Associated Diagnoses:   Diabetes mellitus type 1; Hyperlipidemia NOS; Overweight (BMI 25.0-29.9)   Author:   Rupal White      Visit Information   Visit type:  Diabetes Self Management Education .    Referral source:  Richar RIZZO, Kb.       Chief Complaint   Uncontrolled DM Type 1 (ROLANDO), Hyperlipidemia, Overweight       History of Present Illness   Discussed nutrition, diabetes, and lifestyle management with pt.  Pt is here today to download Medtronic 630G insulin pump.  Pt started pump on 17.    Pt's comfort level with the pump is very happy he made the transition to the pump.  Hgb a1c is being done today.  Pt will follow up with video or telemedicine visit for pump changes.    Pt has had an increasing trend as pt has not been as active and previous a1c higher than goal of <7%.    Nutrition:  Pt does read labels but is not always accurate with carb counting.  Typical breakfast cereal with milk, whole banana, toast, vegetable juice - has not changed   Majority of carb boluses are for over 70 grams up to 125 grams   Working on trying to bolus 15 min prior to eating     Physical Activity:  [facility], did some swimming in [place] but also closed early)   Stress:   moderate  Sleep: good  Support: wife  Insulin:  Novolog via pump ~48.06u/ day   BG Testing: ~ 4.3x/ day   Routine diabetes care:  up to date with eye (19), dental, and foot exams - no concerns      BG avg 181 +/- 120 mg/dL (significant variability)  Avg Daily Basal 16.58 (35%)  Avg Daily Bolus 31.47 (66%)  19% (11 readings below 70mg/dL)     ICR=   12:00     7  15:00     8 **pt changed this to 7 at some point     Sensitivity=  0:00  45  6:30  42   20:30  45      Target     120 - 120     Basal:  16.588    MN   0.575    3am   0.575    6:30am  0.750    13:00  0700         Insulin action  3.5 hours  Max Bolus    17u  Max Basal   1u/ hr       Health Status   Allergies:    Nonallergic Reactions (Selected)  Severity Not Documented  Atorvastatin (Joint pain)   Medications:  (Selected)   Prescriptions  Prescribed  CONTOUR NEXT TEST STRIP: See Instructions, Instructions: TEST 4 TIMES A DAY, # 400 unknown unit, 3 Refill(s), Type: Maintenance, Pharmacy: [facility] [id number] [facility], TEST 4 TIMES A DAY  CONTOUR NEXT TEST STRIPS: CONTOUR NEXT TEST STRIPS, See Instructions, Instructions: pt testing 4 times daily- pt has a pump and needing to test more often due to fluctuating BS's - Dx - Z96.41 and E10.9, Supply, # 400 EA, 3 Refill(s), Type: Maintenance, Pharmacy: [facility]  Glucagon Emergency Kit for Low Blood Sugar 1 mg injection: See Instructions, Instructions: 1mg injection if pt unable to safely consume glucose, # 1 EA, 0 Refill(s), Type: Maintenance, Pharmacy: [place] [id number] [facility], keep on hold, 1mg injection if pt unable to safely consume glucose  Glucose tabs: Glucose tabs, See Instructions, Instructions: use as needed for hypoglycemia, # 100 EA, 3 Refill(s), Type: Maintenance, Pharmacy: [facility]  NovoLOG 100 units/mL injectable solution: See Instructions, Instructions: INJECT UP TO 70 UNITS DAILY VIA PUMP-  Dx E10.9, # 90 mL, 3 Refill(s), Type: Maintenance, Pharmacy: [facility], INJECT UP TO 70 UNITS DAILY VIA PUMP-  Dx E10.9  ULTRA-FINE III SHORT PEN NEEDL MG INJECTABLE: ULTRA-FINE III SHORT PEN NEEDL MG INJECTABLE, See Instructions, Instructions: injects 4 times daily, # 400 EA, 11 Refill(s), Pharmacy: [facility] [id number]  Zithromax Z-Aaron 250 mg oral tablet: Take 2 tablets on Day 1 and then 1 tablet, Oral, daily, Instructions: until finished, # 6 tab(s), 0 Refill(s), Type: Maintenance, Pharmacy: [facility], Take 2 tablets on Day 1 and then 1 tablet Oral daily,Instr:until finished  aspirin 81 mg oral tablet: 1 tab(s) ( 81 mg ), PO, Daily, # 30 tab(s), 0  Refill(s), Type: Maintenance, OTC (Rx)  azithromycin 250 mg oral tablet: = 1 packet(s), Oral, once, Instructions: as directed on package labeling, # 6 tab(s), 0 Refill(s), Type: Soft Stop, Pharmacy: [id number], 1 packet(s) Oral once,Instr:as directed on package labeling  levothyroxine 200 mcg (0.2 mg) oral tablet: = 1 tab(s) ( 200 mcg ), po, daily, # 90 tab(s), 1 Refill(s), Type: Maintenance, Pharmacy: [facility], keep on hold and pt will notify when needed, 1 tab(s) Oral daily  pravastatin 20 mg oral tablet: = 1 tab(s) ( 20 mg ), Oral, daily, # 90 tab(s), 0 Refill(s), Type: Maintenance, Pharmacy: [facility]   Problem list:    All Problems  Intolerance of drug / SNOMED CT 18960643 / Confirmed  Essential tremor / SNOMED CT 6003789735 / Confirmed  Hyperlipidemia / SNOMED CT 74042497 / Confirmed  Hypothyroid / SNOMED CT 91148598 / Confirmed  Insulin pump in place / SNOMED CT 9925129685 / Confirmed  Diabetes mellitus type 1 / SNOMED CT 624407016 / Confirmed  Canceled: Long term (current) use of insulin / SNOMED CT 415703949      Histories   Past Medical History:    Active  Hyperlipidemia (01667877)  Hypothyroid (51062953)  Comments:  2010 CST 1:40 PM Kassi Gonzalez CMA  2007  Diabetes mellitus type 1 (123636016)  Comments:  2010 CST 1:41 PM Kassi Gonzalez CMA  2007  Essential tremor (8762780885)   Family History:    Diabetes mellitus type I  Father ()  Bowel obstruction  Father ()  [place] - Cancer of uterus  Mother ()     Procedure history:    Colonoscopy (SNOMED CT 339375670) performed by Mikhail Dawson MD on 2019 at 74 Years.  Comments:  2020 8:52 AM CDT - Marianna Villanueva  Indication: Positive Cologuard.             Sedation: MAC.    2019 9:23 AM CDT - Malika Joya CMA  Tubular adenoma - negative for high grade dysplasia - Repeat in 3years  Screening for colon cancer (SNOMED CT 3953698833) performed by Kb Mcqueen MD on 2019 at 74  "Years.  Comments:  6/3/2019 4:46 PM CDT - Yoanna Danielson MA  Colnagi result:  positive  Recommendation:  needs colonoscopy  Screening for malignant neoplasm of colon (SNOMED CT 2760539100) on 12/7/2015 at 70 Years.  Comments:  1/30/2016 8:15 AM CST - Kit Adalinael Fowler is negative  Sialogram (SNOMED CT 264038450) on 2/1/2006 at 60 Years.  Comments:  3/29/2013 10:58 AM CDT - Yoanna Porras  \"Stone.  Swollen gland.\"  removal of stone, left mandibular gland in the month of 12/2005 at 60 Years.  Sigmoidoscopy (SNOMED CT 67993319) performed by Sky Bui MD on 10/17/2002 at 57 Years.  Insulin pump, device (SNOMED CT 6990936683).   Social History:        Alcohol Assessment            1-2 times per month, 1 drinks/episode average.      Tobacco Assessment            Former smoker, quit more than 30 days ago, Cigarettes      Employment and Education Assessment            Retired, Work/School description: /, [employer].  Previous               employment/school: [employer].      Home and Environment Assessment            Marital status: .  Spouse/Partner name: Vicenta.      Exercise and Physical Activity Assessment            Exercise frequency: Daily.  Exercise type: Aerobics, Walking, Weight lifting.        Physical Examination   VS/Measurements      Review / Management   Results review      Impression and Plan   Diagnosis     Diabetes mellitus type 1 (CXT83-KT E10.9).     Hyperlipidemia NOS (TCI76-ZZ E11.69).     Overweight (BMI 25.0-29.9) (NGZ72-NO E66.3).       Counseled: Patient, Further instruction on AADE7 Self Care Behaviors  Discussed living well with diabetes, further information about diabetes disease process, reviewed chronic condition and appropriate treatment and self management  Healthy Eating - Large focus on accuracy of carbohydrate counting.  Pt has calorie sander at home and will also start to measure food and read " labels.  Will not change insulin to carb ratio at this time as his carb counting has been on the very low side especially for breakfast foods.  Reviewed one way of trying to reduce glycemic variability through following a carbohydrate controlled meal plan.  Pt given meal planning ideas to incorporate dietary recommendations,  Education review on decreasing cardiovascular risk with following Therapeutic Lifestyle Changes (TLC) nutrition plan for heart healthy eating and healthy eating through the stay at home recommendations  Being Active - Education on how exercise helps with : use temporary basal for exercise   - lowering BG by increasing the muscles ability to take up and use glucose   - weight loss   - healthier heart (improve lipid profile)   - improve sleep, mood, energy   - decrease stress      Monitoring - Reviewed recommended testing schedule and blood glucose target levels.  Test prior to every meal.  Discussed options for Dexcom G6 CGMS as pt would be an excellent candidate, pt declines at this time as he does not want anything else attached to him.    Taking Medication - on NovoLog   Problem Solving -  await a1c to adjust pump settings   Reviewed Pump settings (basal/bolus), menu options and use, bolus wizard suspend, temp basal BG testing recommendations, hypo/hyperglycemia guidelines, infusion set changes and fill and DKA prevention.  medical support team assistance, resources provided (written); good control of stress, pump adjustments made today slightly but elevated reading and low readings likely related to user recommended routine changes.  Bolus early 20+ min prior to meals, test BG prior to ALL meals, bolus for ALL snacks >5gm CHO, use temp basal for exercise  Healthy Coping - support of friends, family, and medical support team   Reducing Risks - Detailed education on potential complications associated with uncontrolled diabetes and prevention recommendations.  Recommendations include: annual  eye exam, good oral cares with brushing BID and flossing daily, routine dental apts. good foot care tips and shoe wear - discussed monofilament test yearly.  Importance of adequate sleep and good control of BG to prevent developing complications related to uncontrolled DM including nephropathy, neuropathy, retinopathy, and cardiovascular disease.  Weight loss goal of 7 % of current body weight pounds as a reasonable goal to help increase insulin sensitivity      Pt able to verbalize understanding of above education, handouts provided for additional resources.       Goals:   1.  Practice healthy stress management and get good quality sleep with the goal of 7-8 hours per night.    2.   Physical activity: Recommend 30 min of physical activity on 5 or more days/ week.    3.  Eat in a healthy way, per diabetic plate method.  Nutrition reference: Eat 3 meals a day; snacks are optional.  A meal is three or more food groups; make it colorful for better nutrition.    4. Count carbohydrates and use bolus wizard for all meals and snacks bolus 15-20min prior to eating   5.  BG testing4+x/ day  Goal pre-meals 75 - 120; 2 hrs after meals 75 - 140   6.  Be aware of s/sx of hypo/ hyperglycemia and follow treatment protocol   7.  Always have back up pump supplies on hand and vial/ syringe with battery and glucose tablets   8. Video/ Telemed in ~ 1 week       Professional Services   Time spent with pt 30 min   cc Dr. Kb Mcqueen

## 2022-02-15 NOTE — PROGRESS NOTES
Patient:   ROSALIO ANDRADE            MRN: [id number]            FIN: [id number]               Age:   74 years     Sex:  Male     :  1945   Associated Diagnoses:   Bronchitis; Diabetes mellitus type 1   Author:   iKng Obrien PA-C      Chief Complaint   2019 10:21 AM CST  Pt here for productive cough and runny nose x week.      History of Present Illness   Chief complaint and symptoms noted above and confirmed with patient   sxs started after coming back home from [place]  using tylenol and sudafed,  using vicks  night sxs are worse  has Type 1 DM      Review of Systems   Constitutional:  Fever, Fatigue.    Ear/Nose/Mouth/Throat:  Nasal congestion, No sore throat.    Respiratory:  Cough, Sputum production, Wheezing, No shortness of breath.       Health Status   Allergies:    Nonallergic Reactions (All)  Severity Not Documented  Atorvastatin (Joint pain)  Canceled/Inactive Reactions (All)  No known allergies   Medications:  (Selected)   Prescriptions  Prescribed  CONTOUR NEXT TEST STRIP: See Instructions, Instructions: TEST 4 TIMES A DAY, # 400 unknown unit, 3 Refill(s), Type: Maintenance, Pharmacy: [facility] [id number] [facility], TEST 4 TIMES A DAY  CONTOUR NEXT TEST STRIPS: CONTOUR NEXT TEST STRIPS, See Instructions, Instructions: pt testing 4 times daily- pt has a pump and needing to test more often due to fluctuating BS's - Dx - Z96.41 and E10.9, Supply, # 400 EA, 3 Refill(s), Type: Maintenance, Pharmacy: [facility]  Glucagon Emergency Kit for Low Blood Sugar 1 mg injection: See Instructions, Instructions: 1mg injection if pt unable to safely consume glucose, # 1 EA, 0 Refill(s), Type: Maintenance, Pharmacy: [facility], keep on hold, 1mg injection if pt unable to safely consume glucose  Glucose tabs: Glucose tabs, See Instructions, Instructions: use as needed for hypoglycemia, # 100 EA, 3 Refill(s), Type: Maintenance, Pharmacy: [facility]  NovoLOG 100 units/mL injectable solution: See  Instructions, Instructions: INJECT UP TO 70 UNITS DAILY VIA PUMP-  Dx E10.9, # 90 mL, 3 Refill(s), Type: Maintenance, Pharmacy: [email or web address], INJECT UP TO 70 UNITS DAILY VIA PUMP-  Dx E10.9  ULTRA-FINE III SHORT PEN NEEDL MG INJECTABLE: ULTRA-FINE III SHORT PEN NEEDL MG INJECTABLE, See Instructions, Instructions: injects 4 times daily, # 400 EA, 11 Refill(s), Pharmacy: [facility] [id number]  aspirin 81 mg oral tablet: 1 tab(s) ( 81 mg ), PO, Daily, # 30 tab(s), 0 Refill(s), Type: Maintenance, OTC (Rx)  levothyroxine 200 mcg (0.2 mg) oral tablet: = 1 tab(s) ( 200 mcg ), po, daily, # 90 tab(s), 1 Refill(s), Type: Maintenance, Pharmacy: [facility], keep on hold and pt will notify when needed, 1 tab(s) Oral daily  pravastatin 20 mg oral tablet: = 1 tab(s) ( 20 mg ), PO, hs, # 90 tab(s), 3 Refill(s), Type: Maintenance, Pharmacy: [facility] [id number] [facility], keep on file and pt will notify when needed, 1 tab(s) Oral hs   Problem list:    All Problems (Selected)  Diabetes mellitus type 1 / SNOMED CT 368452006 / Confirmed  Insulin pump in place / SNOMED CT 9925548238 / Confirmed  Essential tremor / SNOMED CT 4688397799 / Confirmed  Hypothyroid / SNOMED CT 77211321 / Confirmed  Hyperlipidemia / SNOMED CT 18177689 / Confirmed  Intolerance of drug / SNOMED CT 52160005 / Confirmed      Histories   Past Medical History:    Active  Hyperlipidemia (25054270)  Hypothyroid (02271867)  Comments:  2010 CST 1:40 PM Kassi Gonzalez CMA  2007  Diabetes mellitus type 1 (101664663)  Comments:  2010 CST 1:41 PM Kassi Gonzalez CMA  2007  Essential tremor (8492115370)   Family History:    Diabetes mellitus type I  Father ()  Bowel obstruction  Father ()  [place] - Cancer of uterus  Mother ()     Procedure history:    Colonoscopy (205976622) on 2019 at 74 Years.  Comments:  2019 9:23 AM CDT - Malika Joya CMA  Tubular adenoma - negative for high grade dysplasia - Repeat in 3years  Screening  "for colon cancer (6857556865) on 5/20/2019 at 74 Years.  Comments:  6/3/2019 4:46 PM CDT - Jagjit ROCHA, Yoanna Fowler result:  positive  Recommendation:  needs colonoscopy  Screening for malignant neoplasm of colon (2290893139) on 12/7/2015 at 70 Years.  Comments:  1/30/2016 8:15 AM CST - Adali Pantoja is negative  Sialogram (777279060) on 2/1/2006 at 60 Years.  Comments:  3/29/2013 10:58 AM CDT - Yoanna Porras  \"Stone.  Swollen gland.\"  removal of stone, left mandibular gland in the month of 12/2005 at 60 Years.  Sigmoidoscopy (92604988) on 10/17/2002 at 57 Years.  Insulin pump, device (2439139453).      Physical Examination   Vital Signs   11/25/2019 10:21 AM CST Temperature Tympanic 98 DegF    Peripheral Pulse Rate 88 bpm    Pulse Site Radial artery    Respiratory Rate 16 br/min    Systolic Blood Pressure 126 mmHg    Diastolic Blood Pressure 64 mmHg    Mean Arterial Pressure 85 mmHg    BP Site Right arm    Oxygen Saturation 98 %      Measurements from flowsheet : Measurements   11/25/2019 10:21 AM CST Height Measured - Standard 68.5 in    Weight Measured - Standard 182 lb    BSA 2 m2    Body Mass Index 27.27 kg/m2  HI      General:  No acute distress.    HENT:  Tympanic membranes are clear, No pharyngeal erythema, No sinus tenderness, nares are patent.    Neck:  Supple, Non-tender, No lymphadenopathy.    Respiratory:  lungs have coarse ronchi bilaterally, no wheezes, no rales.    Cardiovascular:  Normal rate, Regular rhythm, No murmur.       Impression and Plan   Diagnosis     Bronchitis (ARJ15-JM J40).     Diabetes mellitus type 1 (DWC51-IF E10.9).     Summary:  because of hx of fever, his DM, and no improvement after a week will treat with zpak, continue with expectorant and decongestants, follow up if not improving.    Orders     Orders   Pharmacy:  Zithromax Z-Aaron 250 mg oral tablet (Prescribe): Take 2 tablets on Day 1 and then 1 tablet, Oral, daily, Instructions: until finished, # 6 tab(s), " 0 Refill(s), Type: Maintenance, Pharmacy: [facility], Take 2 tablets on Day 1 and then 1 tablet Oral daily,Instr:until finished.     Orders   Charges (Evaluation and Management):  [id number] office outpatient visit 15 minutes (Charge) (Order): Quantity: 1, Bronchitis  Diabetes mellitus type 1.

## 2022-02-15 NOTE — TELEPHONE ENCOUNTER
Entered by Malika Joya CMA on October 04, 2019 3:12:51 PM CDT  ---------------------  From: Malika Joya CMA   To: Gregg Patel    Sent: 10/4/2019 3:12:50 PM CDT  Subject: Medication Management     ** Not Approved: Refill not appropriate, called in yesterday to [facility] **  insulin aspart (NOVOLOG 100U/ML 10ML INJECTABLE)  INJECT UP TO 70 UNIT PER DAY VIA PUMP - BILL ADP  Qty:  10 unknown unit        Days Supply:  0        Refills:  0          Substitutions Allowed     Route To Pharmacy - [facility]   Signed by Malika Joya CMA            ** Patient matched by Malika Joya CMA on 10/4/2019 3:10:48 PM CDT **      ------------------------------------------  From: Gregg Patel  To: Kb Mcqueen MD  Sent: October 3, 2019 3:52:39 PM CDT  Subject: Medication Management  Due: October 4, 2019 3:52:39 PM CDT    ** On Hold Pending Signature **  Drug: insulin aspart (NovoLOG 100 units/mL injectable solution)  INJECT UP TO 70 UNIT PER DAY VIA PUMP - BILL ADP  Quantity: 10 unknown unit  Days Supply: 0         Refills: 0  Substitutions Allowed  Notes from Pharmacy:     Dispensed Drug: insulin aspart (NovoLOG 100 units/mL injectable solution)  INJECT UP TO 70 UNIT PER DAY VIA PUMP - BILL ADP  Quantity: 10 unknown unit  Days Supply: 0         Refills: 0  Substitutions Allowed  Notes from Pharmacy:   ------------------------------------------

## 2022-02-15 NOTE — PROGRESS NOTES
Patient:   ROSALIO ANDRADE            MRN: [id number]            FIN: [id number]               Age:   72 years     Sex:  Male     :  1945   Associated Diagnoses:   Hypothyroid; Hyperlipidemia; Diabetes mellitus type 1; Tremor, Essential   Author:   Kb Mcqueen MD      Visit Information      Date of Service: 2017 08:51 am  Performing Location: [facility]  Encounter#: [id number]      Primary Care Provider (PCP):  Kb Mcqueen MD    NPI# [id number]      Referring Provider:  Kb Mcqueen MD    NPI# [id number]      Chief Complaint   2017 8:55 AM CDT    Pt here for Diabetic check and FU on recent labwork.              Additional Information:No additional information recorded during visit.   Chief complaint and symptoms as noted above and confirmed with patient      History of Present Illness   4/10/2014: Rosalio presents to clinic to establish care, previously followed by JAMES approximately the last 10 yrs. he s been diagnosed with adult onset type 1 diabetes, insulin managed with a combination of basal bolus insulin therapy.  Historically his glycemic control has not been optimal. Is very knowledgeable about carbohydrate counting and insulin to carbohydrate ratios.  He says that his main nemesis is evening snacking.  He will typically bolus with NovoLog when eating popcorn, otherwise does not bolus with snacking.  He has no known diabetic complications.  He remains very active with daily exercise, primarily swimming.  He swam back in college.  He resides in [place] for a portion of the year.  He s been resistant to starting on cholesterol medicines in the past.  Also with complaints of left sided elbow/forearm pains - not improved with forearm brace.  He is a retired orchestral director and plays base cello - arm pains limiting his abilities.    2015: Rosalio presents to clinic for regular diabetic follow-up.  No hypoglycemic complaints.  Labs reviewed with him.  Complaints of right-sided  anterior shoulder discomfort that it has been limiting his swimming activities for the past 4-5 months.  He does express some interest in potentially pursuing an insulin pump.  Planning on doing winter camping up in the [facility].    8/11/2017: Presents for follow-up related to his type 1 diabetes. He started on insulin pump therapy this past winter. He has been doing very well with this. He takes off pump for showering and when swimming. Overall is really liked this transition. Minimal issues related to cannulation problems.           Review of Systems   Constitutional:  No fever, No chills.    Eye:  Negative except as documented in history of present illness.    Ear/Nose/Mouth/Throat:  Negative except as documented in history of present illness.    Respiratory:  No shortness of breath.    Cardiovascular:  No chest pain, No palpitations, No peripheral edema, No syncope.    Gastrointestinal:  No nausea, No vomiting, No abdominal pain.    Genitourinary:  ED, No dysuria, No hematuria.    Hematology/Lymphatics:  Negative except as documented in history of present illness.    Endocrine:  No excessive thirst, No polyuria.    Immunologic:  No recurrent fevers.    Musculoskeletal:  No joint pain, No muscle pain.    Neurologic:  Alert and oriented X4, No numbness, No tingling, No headache.       Health Status   Allergies:    Nonallergic Reactions (Selected)  Severity Not Documented  Atorvastatin (Joint pain)   Medications:  (Selected)   Prescriptions  Prescribed  B-D PEN NDL SHRT [id number](5/16) CHRISTINA: B-D PEN NDL SHRT [id number](5/16) CHRISTINA, See Instructions, Instructions: INJECT FOUR TIMES DAILY AS DIRECTED, Supply, # 400 EA, 3 Refill(s), Pharmacy: [facility] [id number], keep on hold and pt will notify when needed, INJECT FOUR TIMES DAILY AS DIRECTED...  CONTOUR TEST STRIPS 100'S: CONTOUR TEST STRIPS 100'S, See Instructions, Instructions: TEST FOUR TIMES DAILY AS DIRECTED, Supply, # 400 EA, 3 Refill(s), [id number], keep on hold and pt will notify when needed, TEST FOUR TIMES DAILY AS DIRECTED  Contour next test strips: Contour next test strips, See Instructions, Instructions: testing 4x/ day - new start for insulin pump, Supply, # 8 bottle(s), 3 Refill(s), Type: Maintenance, Pharmacy: [facility], testing 4x/ day - new start for insulin pump  Contour test strips and lancets: Contour test strips and lancets, See Instructions, Instructions: testing 4 times daily, # 400 EA, 11 Refill(s), Type: Soft Stop, Pharmacy: [facility]  Glucose tabs: Glucose tabs, See Instructions, Instructions: use as needed for hypoglycemia, # 100 EA, 3 Refill(s), Type: Maintenance, Pharmacy: [facility]  NovoLOG 100 units/mL subcutaneous solution: See Instructions, Instructions: ~70u/ day use with insulin pump, # 60 mL, 3 Refill(s), Type: Maintenance, Pharmacy: [facility], Pt started Medtronic insulin pump 5/30/17 - insulin should now be covered, ~70u/ day use with insulin pump  ULTRA-FINE III SHORT PEN NEEDL MG INJECTABLE: ULTRA-FINE III SHORT PEN NEEDL MG INJECTABLE, See Instructions, Instructions: injects 4 times daily, # 400 EA, 11 Refill(s), Pharmacy: [facility] [id number]  aspirin 81 mg oral tablet: 1 tab(s) ( 81 mg ), PO, Daily, # 30 tab(s), 0 Refill(s), Type: Maintenance, OTC (Rx)  clobetasol 0.05% topical cream: 1 varsha, TOP, BID, Instructions: apply a thin film; apply to affected rash, # 15 g, 1 Refill(s), Type: Maintenance, Pharmacy: [facility], 1 varsha top bid,Instr:apply a thin film; apply to affected rash  levothyroxine 200 mcg (0.2 mg) oral tablet: 1 tab(s) ( 200 mcg ), po, daily, # 90 tab(s), 3 Refill(s), Pharmacy: [facility], keep on hold and pt will notify when needed, 1 tab(s) po daily  pravastatin 20 mg oral tablet: 1 tab(s) ( 20 mg ), PO, hs, # 90 tab(s), 3 Refill(s), Type: Maintenance, Pharmacy: [facility] [id number],  "keep on file and pt will notify when needed, 1 tab(s) po hs  Documented Medications  Documented  Cinnamon: ( 1,000 mg ), po, daily, 0 Refill(s), Type: Maintenance  Fish Oil: 0  Flax Seed Oil: 0  Metamucil: See Instructions, Instructions: 1 tbsp daily, 0 Refill(s), Type: Maintenance  Multiple Vitamins oral capsule: 1 cap(s), PO, Daily, 0 Refill(s)  Vitamin C: ( 1,000 mg ), daily, 0 Refill(s)   Problem list:    All Problems  Diabetes mellitus type 1 / ICD-9-.01 / Confirmed  Intolerance of drug / SNOMED CT 34621908 / Confirmed  Essential tremor / ICD-9-.1 / Confirmed  Hyperlipidemia / SNOMED CT 55256892 / Confirmed  Hypothyroid / ICD-9-.9 / Confirmed      Histories   Past Medical History:    Active  Hyperlipidemia (48043273)  Hypothyroid (244.9)  Comments:  2010 CST 1:40 PM CST - Kassi Link CMA  2007  Diabetes mellitus type 1 (250.01)  Comments:  2010 CST 1:41 PM GARCIA - Kassi Link CMA  2007  Essential tremor (333.1)   Family History:    Diabetes mellitus type I  Father ()  Bowel obstruction  Father ()  CA - Cancer of uterus  Mother ()     Procedure history:    Screening for malignant neoplasm of colon (6581128289) on 2015 at 70 Years.  Comments:  2016 8:15 AM - Adali Pantoja is negative  Sialogram ([id number]) on 2006 at 60 Years.  Comments:  3/29/2013 10:58 AM - Yoanna Porras  \"Stone.  Swollen gland.\"  removal of stone, left mandibular gland in the month of 2005 at 60 Years.  Sigmoidoscopy ([id number]) on 10/17/2002 at 57 Years.   Social History:        Alcohol Assessment: Low Risk            1-2 times per month, 1 drinks/episode average.      Tobacco Assessment: Past      Substance Abuse Assessment: Denies Substance Abuse      Employment and Education Assessment            Retired, Work/School description: /, [employer].  Previous               employment/school: [employer]      Home and Environment Assessment            Marital status: .  Spouse/Partner name: Vicenta.      Exercise and Physical Activity Assessment: Regular exercise            Exercise frequency: Daily.  Exercise type: Aerobics, Walking, Weight lifting.        Physical Examination   vital signs stable, as noted above   Vital Signs   8/11/2017 8:55 AM CDT Temperature Tympanic 96.4 DegF  LOW    Peripheral Pulse Rate 80 bpm    Pulse Site Radial artery    Respiratory Rate 16 br/min    Systolic Blood Pressure 126 mmHg    Diastolic Blood Pressure 60 mmHg    Mean Arterial Pressure 82 mmHg    BP Site Right arm      Measurements from flowsheet : Measurements   8/11/2017 8:55 AM CDT Height Measured - Standard 68.5 in    Weight Measured - Standard 174 lb    BSA 1.95 m2    Body Mass Index 26.07 kg/m2      General:  Alert and oriented, No acute distress.    Eye:  Extraocular movements are intact.    HENT:  Normocephalic, Oral mucosa is moist.    Neck:  Supple, No lymphadenopathy.    Respiratory:  Lungs are clear to auscultation, Respirations are non-labored.    Cardiovascular:  Normal rate, Regular rhythm, No murmur, No edema.    Gastrointestinal:  Soft, Non-distended, Wearing insulin pump.    Musculoskeletal:  Normal range of motion, Normal strength.    Neurologic:  Alert, Oriented, Normal motor function, No focal deficits, monofilament testing normal.    Cognition and Speech:  Oriented, Speech clear and coherent.    Psychiatric:  Appropriate mood & affect.       Review / Management   Results review:  Lab results   8/3/2017 7:55 AM CDT Hgb A1c 8.0  HI    Cholesterol 195 mg/dL    Non-    HDL 89 mg/dL    Chol/HDL Ratio 2.2    LDL 98    Triglyceride 38 mg/dL    TSH 1.57 mIU/L   .       Impression and Plan   Diagnosis     Hypothyroid (PSS92-KK E03.9).     Hyperlipidemia (CDT13-UW E78.5).     Diabetes mellitus type 1 (AMK93-FW E10.9).     Tremor, Essential (WOV22-UH G25.0).       .) type I DM,  uncontrolled, late onset diabetes in adulthood (ROLANDO)  hypoglycemic medications:  insulin therapy:  Medtronic pump: ICR: 1:7, sensitivity 40mg/dL; basal 0.5-0.625/hr    - 67% bolus needs  last HbA1c: 8.0% (improving)  microvascular complications: none  macrovascular complications: none  ASA/SIMON/statin:  ASA 81mg daily, pravastatin 20mg qhs    .) ED   - Rx for cialis trial    health maintenance   -Cologuard negative (12/2015)   - PSA 0.3 (11/2015)   - enrolled in [facility]    RTC in 4 months      Professional Services   Counseling Summary:  This was a 25 minute visit with greater than 50% of that time spent counseling the patient.

## 2022-02-15 NOTE — TELEPHONE ENCOUNTER
Entered by Malika Joya CMA on June 03, 2021 3:06:42 PM CDT  spoke with pt and he will be seeing Nate, PT tomorrow.   He will share XR report with him and decide on ortho referral, he's aware a referral has been placed       ---------------------  From: Kb Mcqueen MD   [facility]: [facility] [id number]     Sent: 6/3/2021 12:58:42 PM CDT  Subject: General Message     Let Irwin know that radiologist did not see any fracture on recent xrays of wrist.  Given that he's had continued pains for more than 2 months after injury, I would feel better with him seeing ortho-hand.  I am placing referral order.

## 2022-02-15 NOTE — NURSING NOTE
Quick Intake Entered On:  5/5/2020 3:27 PM CDT    Performed On:  5/5/2020 3:16 PM CDT by Mari Guillen RN               Summary   Chief Complaint :   BP, Wt   Weight Measured :   182.6 lb(Converted to: 182 lb 10 oz, 82.83 kg)    Height Measured :   68.5 in(Converted to: 5 ft 8 in, 173.99 cm)    Body Mass Index :   27.36 kg/m2 (HI)    Body Surface Area :   2 m2   Systolic Blood Pressure :   163 mmHg (HI)    Diastolic Blood Pressure :   78 mmHg   Mean Arterial Pressure :   106 mmHg   Peripheral Pulse Rate :   60 bpm   BP Site :   Right arm   Pulse Site :   Brachial artery   BP Method :   Electronic   HR Method :   Electronic   Mari Guillen RN - 5/5/2020 3:26 PM CDT

## 2022-02-15 NOTE — PROGRESS NOTES
Patient:   ROSALIO ANDRADE            MRN: [id number]            FIN: [id number]               Age:   72 years     Sex:  Male     :  1945   Associated Diagnoses:   Diabetes mellitus type 1; Hyperlipidemia NOS; Overweight (BMI 25.0-29.9)   Author:   Rupal White      Visit Information      Date of Service: 2017 08:15 am  Performing Location: [facility]  Encounter#: [id number]      Primary Care Provider (PCP):  Kb Mcqueen MD    NPI# [id number]   Visit type:  Medical Nutrition Therapy.    Referral source:  Kb Mcqueen MD.       Chief Complaint   Uncontrolled DM Type 1 (ROLANDO), Hyperlipidemia, Overweight       History of Present Illness   Discussed nutrition, diabetes, and lifestyle management with pt.  Pt is here today to download Medtronic 630G insulin pump.  Pt started it on 17.  Pt's comfort level with the pump is very happy he made the transition to the pump.  Hgb a1c is at 8% stable, goal <8%  Nutrition:  Pt is counting carbohydrates and using the bolus wizzard as directed.  Pt's carb intake is high at 262 +/-31 gm/ day which significantly affects glycemic fluctuations and variability     Physical Activity:  1 hour daily variety ( walk, weights etc), goes to [place] over the winter months and walks 3 miles daily, swimming daily   Stress:   moderate  Sleep: good  Support: wife  Insulin:  Novolog via pump ~49.3u/ day   BG Testing: ~ 3.6x/ day not testing at evening meal   Routine diabetes care:  up to date with eye, dental, and foot exams - no concerns  Estimated Average Glucose (eAG) 183 mg/dL with A1C of 8.0% - stable, pump adjustments made today     BG avg 195 +/- 100 mg/dL   Avg Daily Basal 14.31 (29%)  Avg Daily Bolus 35.03 (71%)    ICR=   12:00     9    ICR = changed to   12:00     7  15:00     9    Sensitivity=  40    Target     110 - 110    Basal: =     14.325 u/ day     MN-    0.400  3:00 am    0.625    Basal: changed to 15.975  MN - 3am   0.550  3am - 3pm  0.725  3pm - MN    0.625    Insulin action  3.5 hours  Max Bolus    17u  Max Basal   1u/ hr       Health Status   Allergies:    Nonallergic Reactions (Selected)  Severity Not Documented  Atorvastatin (Joint pain)   Medications:  (Selected)   Prescriptions  Prescribed  B-D PEN NDL SHRT [id number](5/16) CHRISTINA: B-D PEN NDL SHRT [id number](5/16) CHRISTINA, See Instructions, Instructions: INJECT FOUR TIMES DAILY AS DIRECTED, Supply, # 400 EA, 3 Refill(s), Pharmacy: [facility], keep on hold and pt will notify when needed, INJECT FOUR TIMES DAILY AS DIRECTED...  CONTOUR TEST STRIPS 100'S: CONTOUR TEST STRIPS 100'S, See Instructions, Instructions: TEST FOUR TIMES DAILY AS DIRECTED, Supply, # 400 EA, 3 Refill(s), Pharmacy: [facility], keep on hold and pt will notify when needed, TEST FOUR TIMES DAILY AS DIRECTED  Contour next test strips: Contour next test strips, See Instructions, Instructions: testing 4x/ day - new start for insulin pump, Supply, # 8 bottle(s), 3 Refill(s), Type: Maintenance, Pharmacy: [facility] [id number], testing 4x/ day - new start for insulin pump  Contour test strips and lancets: Contour test strips and lancets, See Instructions, Instructions: testing 4 times daily, # 400 EA, 11 Refill(s), Type: Soft Stop, Pharmacy: [facility]  Glucagon Emergency Kit for Low Blood Sugar 1 mg injection: See Instructions, Instructions: 1mg injection if pt unable to safely consume glucose, # 1 EA, 0 Refill(s), Type: Maintenance, Pharmacy: [facility] [id number], pt will have free micky coupon, 1mg injection if pt unable to safely consume glucose  Glucose tabs: Glucose tabs, See Instructions, Instructions: use as needed for hypoglycemia, # 100 EA, 3 Refill(s), Type: Maintenance, Pharmacy: [facility]  NovoLOG 100 units/mL subcutaneous solution: See Instructions, Instructions: ~70u/ day use with insulin pump, # 60 mL, 3 Refill(s), Type: Maintenance, Pharmacy: [facility] [id number], Pt started  Medtronic insulin pump 5/30/17 - insulin should now be covered, ~70u/ day use with insulin pump  ULTRA-FINE III SHORT PEN NEEDL MG INJECTABLE: ULTRA-FINE III SHORT PEN NEEDL MG INJECTABLE, See Instructions, Instructions: injects 4 times daily, # 400 EA, 11 Refill(s), Pharmacy: [facility] [id number]  aspirin 81 mg oral tablet: 1 tab(s) ( 81 mg ), PO, Daily, # 30 tab(s), 0 Refill(s), Type: Maintenance, OTC (Rx)  clobetasol 0.05% topical cream: 1 varsha, TOP, BID, Instructions: apply a thin film; apply to affected rash, # 15 g, 1 Refill(s), Type: Maintenance, Pharmacy: [facility], 1 varsha top bid,Instr:apply a thin film; apply to affected rash  levothyroxine 200 mcg (0.2 mg) oral tablet: 1 tab(s) ( 200 mcg ), po, daily, # 90 tab(s), 0 Refill(s), Type: Maintenance, Pharmacy: [facility] [id number]  pravastatin 20 mg oral tablet: 1 tab(s) ( 20 mg ), PO, hs, # 90 tab(s), 0 Refill(s), Type: Maintenance, Pharmacy: [facility], keep on file and pt will notify when needed, 1 tab(s) po hs  Documented Medications  Documented  Cinnamon: ( 1,000 mg ), po, daily, 0 Refill(s), Type: Maintenance  Fish Oil: 0  Flax Seed Oil: 0  Metamucil: See Instructions, Instructions: 1 tbsp daily, 0 Refill(s), Type: Maintenance  Multiple Vitamins oral capsule: 1 cap(s), PO, Daily, 0 Refill(s)  Vitamin C: ( 1,000 mg ), daily, 0 Refill(s)   Problem list:    All Problems  Diabetes mellitus type 1 / ICD-9-.01 / Confirmed  Intolerance of drug / SNOMED CT 58930907 / Confirmed  Essential tremor / ICD-9-.1 / Confirmed  Hyperlipidemia / SNOMED CT 96494275 / Confirmed  Hypothyroid / ICD-9-.9 / Confirmed      Histories   Past Medical History:    Active  Hyperlipidemia (83073954)  Hypothyroid (244.9)  Comments:  1/27/2010 CST 1:40 PM CST - Kassi Link CMA  2007  Diabetes mellitus type 1 (250.01)  Comments:  1/27/2010 CST 1:41 PM Kassi Gonzalez CMA  2007  Essential tremor (333.1)   Family History:  "   Diabetes mellitus type I  Father ()  Bowel obstruction  Father ()  [place] - Cancer of uterus  Mother ()     Procedure history:    Screening for malignant neoplasm of colon (SNOMED CT 1377131022) on 2015 at 70 Years.  Comments:  2016 8:15 AM - Kit Adali  nagi is negative  Sialogram (SNOMED CT 018769297) on 2006 at 60 Years.  Comments:  3/29/2013 10:58 AM - Yoanna Porras  \"Stone.  Swollen gland.\"  removal of stone, left mandibular gland in the month of 2005 at 60 Years.  Sigmoidoscopy (SNOMED CT 96855420) performed by Sky Bui MD on 10/17/2002 at 57 Years.   Social History:        Alcohol Assessment: Low Risk            1-2 times per month, 1 drinks/episode average.      Tobacco Assessment: Past      Substance Abuse Assessment: Denies Substance Abuse      Employment and Education Assessment            Retired, Work/School description: /, [employer].  Previous               employment/school: [employer].      Home and Environment Assessment            Marital status: .  Spouse/Partner name: Vicenta.      Exercise and Physical Activity Assessment: Regular exercise            Exercise frequency: Daily.  Exercise type: Aerobics, Walking, Weight lifting.        Physical Examination   Measurements from flowsheet : Measurements   2017 1:54 PM CST Height Measured - Standard 68.5 in    Weight Measured - Standard 179.75 lb    BSA 1.98 m2    Body Mass Index 26.93 kg/m2         Health Maintenance      Recommendations     Pending (in the next year)        OverDue           Tetanus Vaccine due  12  and every 10  year(s)           DM - Communication with Managing Provider due  16  and every 1  year(s)           DM - Foot Exam due  16  and every 1  year(s)           Influenza Vaccine due  16  and every 1  year(s)        Due            Alcohol Misuse Screen (Male) due  " 12/16/17  and every 1  year(s)           Depression Screen (Male) due  12/16/17  and every 1  year(s)           Fall Risk Screen (Male) due  12/22/17  and every 1  year(s)           Lung Cancer Screen (Male) due  12/22/17  and every 1  year(s)        Near Due            DM - Eye Exam near due  12/23/17  and every 1  year(s)        Refused            Zoster/Shingles Vaccine due  12/22/17  One-time only        Due In Future            DM - HgbA1c not due until  03/21/18  and every 3  month(s)           High Blood Pressure Screen (Male) not due until  08/11/18  and every 1  year(s)           Tobacco Use Screen (Male) not due until  08/11/18  and every 1  year(s)           Colorectal Cancer Screen (Colonoscopy) (Male) not due until  12/07/18  and every 3  year(s)           Colorectal Cancer Screen (Occult Blood) (Male) not due until  12/07/18  and every 3  year(s)           DM - Microalbumin not due until  12/21/18  and every 1  year(s)     Satisfied (in the past 1 year)        Satisfied            Body Mass Index Check (Male) on  12/22/17.           Body Mass Index Check (Male) on  09/01/17.           Body Mass Index Check (Male) on  08/11/17.           Body Mass Index Check (Male) on  05/30/17.           Body Mass Index Check (Male) on  04/18/17.           DM - Eye Exam on  12/23/16.           DM - HgbA1c on  12/21/17.           DM - HgbA1c on  08/03/17.           DM - Microalbumin on  12/21/17.           DM - Microalbumin on  12/21/17.           High Blood Pressure Screen (Male) on  08/11/17.           High Blood Pressure Screen (Male) on  04/20/17.           Lipid Disorders Screen (Male) on  08/03/17.           Lipid Disorders Screen (Male) on  08/03/17.           Lipid Disorders Screen (Male) on  08/03/17.           Lipid Disorders Screen (Male) on  08/03/17.           Pneumococcal Vaccine on  04/21/17.           Tobacco Use Screen (Male) on  08/11/17.          Review / Management   Results review:  Lab results    12/21/2017 9:15 AM CST Hgb A1c 8.0  HI    U Microalbumin 0.2 mg/dL    Ur Creatinine 135 mg/dL    Ur Microalb/Creat Ratio 1   .       Impression and Plan   Diagnosis     Diabetes mellitus type 1 (NLY02-VX E10.9).     Hyperlipidemia NOS (HOC66-MY E11.69).     Overweight (BMI 25.0-29.9) (RDR17-VU E66.3).       Counseled: Patient, Further instruction on AADE7 Self Care Behaviors  Discussed living well with diabetes, futher information about diabetes disease process, reviewed chronic condition and appropriate treatment and self management  Healthy Eating - Review carbohydrate counting, reading food labels, appropriate portion sizes, well balanced meals, diabetic plate method as a general rule.  Patient is provided with additional magazines with recipes, meal planning ideas to incorporate dietary recommendations, meal planning and preperation.  Education review on decreaseing cardiovascular risk with following Therapeutic Lifestyle Changes (TLC) nutrition plan for heart healthy eating.    Being Active - Education on how exercise helps with : pt will continue to work towards goal    - lowering BG by increasing the muscles ability to take up and use glucose   - weight loss   - healthier heart (improve lipid profile)   - improve sleep, mood, energy   - decrease stress      Monitoring - Reviewed ecommended testing schedule and blood glucose target levels.  Test prior to every meal  Taking Medication - on Novolog   Problem Solving -    Reviewed Pump settings (basal/bolus), menu options and use, bolus wizzard, suspend, temp basel, BG testing recommendations, hypo/hyperglycemia guidelines, infusion set changes and fill and DKA prevention.  medical support team assistance, resources provided (written); good control of stress, pump adjustments made today pt with high am and noon readings with improving by 10pm   Healthy Coping - support of friends, family, and medical support team   Reducing Risks - Detailed education on  potential complications associated with uncontrolled diabetes and prevention recommendations.  Recommendations include: annual eye exam, good oral cares with brushing BID and flossing daily, routine dental apts, good foot care tips and shoewear - discussed monofilament test yearly.  Importance of adequate sleep and good control of BG to prevent developing complications related to uncontrolled DM including nephropathy, neuropathy, retinopathy, and cardiovascular disease.  Weight loss goal of 7 - 10% of current body weight pounds as a reasonable goal to help increase insulin sensitivity      Pt able to verbalize understanding of above education, handouts provided for additional resources.       Goals:   1.  Practice healthy stress management and get good quality sleep with the goal of 7-8 hours per night.    2.   Physical activity: Recommend 30 min of physical activity on 5 or more days/ week.    3.  Eat in a healthy way, per diabetic plate method.  Nutrition reference: Eat 3 meals a day; snacks are optional.  A meal is three or more food groups; make it colorful for better nutrition.    4. Count carbohydrates and use bolus wizzard for all meals and snacks   5.  BG testing4+x/ daywith pump start Goal premeals 75 - 120; 2 hrs after meals 75 - 140   6.  Be aware of s/sx of hypo/ hyperglycemia and follow treatment protocol   7.  Always have back up pump supplies on hand and vial/ syringe with battery and glucose tablets   8.  follow up in 3 months/ prn       Professional Services   Time spent with pt 60 min   cc Dr. Kb Mcqueen

## 2022-02-15 NOTE — TELEPHONE ENCOUNTER
---------------------  From: Genet Nunez LPN (Phone Messages Pool ([id number]_[facility]))   To: [facility] Message Pool ([id number]_[place] [facility] [place]);     Sent: 9/24/2019 9:21:21 AM CDT  Subject: FW: Contour Test Strips           ---------------------  From: ROSALIO ANDRADE  To: [facility]  Sent: 09/24/2019 08:55 a.m. CDT  Subject: Contour Test Strips  Hi Malika Joya,    Will Dr. Mcqueen please renew my prescription for Contour Test Strips 100 directly with Medicare. I will be using 4/day instead of 3. Thank you.    Rosalio Andrade.---------------------  From: Malika Joya CMA ([facility] Message Pool ([id number]_[facility]))   To: ROSALIO ANDRADE    Sent: 9/24/2019 9:37:29 AM CDT  Subject: RE: Contour Test Strips     Hi Rosalio,  I'm working on it right now, I have been on hold for 20min with Medicare, we started with [facility] at 9am and got NO WHERE... I will let you know as soon as we have something figured out.  Malika

## 2022-02-15 NOTE — TELEPHONE ENCOUNTER
---------------------  From: Litzy Del Toro LPN (Phone Messages Pool ([id number]_[facility]))   To: [facility] Message Pool ([address]224_[place] [facility] [place]);     Sent: 6/7/2019 3:32:41 PM CDT  Subject: CONSUMER MESSAGE FW: Colonoscopy schedule     Please advise - I do not see any communication regarding positive cologuard/order for colonoscopy.      ---------------------  From: ROSALIO MALLOY  To: [facility]  Sent: 06/07/2019 03:07 p.m. CDT  Subject: Colonoscopy schedule  Merrill Daly,  I have not received a call regarding an appointment for a colonoscopy.  Rosalio Malloy.portal message sent

## 2022-02-15 NOTE — PROGRESS NOTES
Patient:   ROSALIO ANDRADE            MRN: [id number]            FIN: [id number]               Age:   76 years     Sex:  Male     :  1945   Associated Diagnoses:   None   Author:   Rupal White      Doctor: Kb Mcqueen   Patient Information  (Medicare and address information is on file)  Medicare HICN#: _  Address:_ City:_ State:_ Zip:_  Home Phone:_ Work Phone:_ Other Contact Phone:_    Diabetes self-management training (DSMT) and medical nutrition therapy (MNT) are individual and complementary services to improve diabetes care. For Medicare beneficiaries, both services can be ordered in the same year. Research indicates MNT combined with DSMT improves outcomes.    Diabetes Self-Managment Training (DSMT)  Medicare: 10 hours initial DSMT in 12 month period, plus 2 hours follow-up annually  *Check type of training services and number of hours requested:  _ Initial DSMT:  10 hours or _ no. hrs. requested  X Follow-up DSMT: X 2 hours or _ no. hrs. requested  PLUS X Additional insulin trainin no. hrs. requested for insulin pump education     *Patients with special needs requiring individual DSMT  Check all special needs that apply:  _ Vision _ Hearing  _ Physical  _Cognitive Impairment  _ Language limitations X Other  No classes offered/ pandemic precautions     *DSMT Content  X All ten content areas, as appropriate  _ Monitoring diabetes   _ Diabetes as disease process  _ Psychological adjustment _ Physical activity  _ Nutritional management  _ Goal setting, problem solving  _ Medications     _ Prevent, detect and treat acute complications  _ Preconception/pregnancy _ Prevent, detect and teat chronic complications     management or gestational     diabetes management    Medical Nutrition Therapy (MNT)  Medicare: 3 hours initial MNT in the first calendar year, plus two hours follow-up MNT annually. Additional MNT hours available for change in medical condition, treament and/or diagnosis.  *Check the type of  MNT and/or number of additional hours requested:  _ Initial MNT: X Annual follow-up MNT  _ Additional MTNservices in the same calendar year, per RD recommendations  _ no. additional hrs. requested    Please specify change in medical condition, treatment and/or diagnosis      *Diagnosis  Please send recent labs for patient eligibility & outcomes monitoring  X Type 1 uncontrolled _ Type 1 controlled  _ Type 2 uncontrolled _ Type 2 controlled  _ Gestational diabetes _ Other _    Complications/Comorbidities  Check all that apply:  _ Hypertension   X Dyslipidemia  _ Stroke  _ Neuropathy   _ Nephropathy  _ PVD  _ Renal disease   _ Retinopathy  _ CHD  _ Non-healing wound _ Pregnancy   _ Obesity  _ Mental/affective disorder      _ Other _    Current Diabetes Medications  Specify type, dose and frequency  Oral: _  Insulin: Insulin Pump Medtronic   Patient now uses: _ Pen _ Needle X Pump    Patient Behavior Goals/Plan of Care    To improve blood sugar control to help prevent potential complications associated with uncontrolled diabetes, while minimizing the risk for hypoglycemia.  Education on insulin pump.      Signature and UPIN #: (UPIN and NPI are on file)  Group/Practice name, address and phone: [address] [address] [phone]

## 2022-02-15 NOTE — PROGRESS NOTES
Patient:   ROSALIO ANDRADE            MRN: [id number]            FIN: [id number]               Age:   72 years     Sex:  Male     :  1945   Associated Diagnoses:   Diabetes mellitus type 1; Hyperlipidemia NOS; Overweight (BMI 25.0-29.9)   Author:   Rupal White      Visit Information   Visit type:  Medical Nutrition Therapy.    Referral source:  Richar RIZZO, Kb.       Chief Complaint   Uncontrolled DM Type 1 (ROLANDO), Hyperlipidemia, Overweight       History of Present Illness   Discussed nutrition, diabetes, and lifestyle management with pt.  Pt is here today to download Medtronic 630G insulin pump.  Pt started it on 17.  Pt's comfort level with the pump has significantly improved.  Hgb a1c is at 8% which is the best it has been since before 2012 - ongoing goal to have it <8%  Nutrition:  Pt is counting carbohydrates and using the bolus wizzard as directed.  Pt's carb intake is much higher at 227 +/-44 gm/ day which significantly affects glycemic fluctuations and variability     Physical Activity:  1 hour daily variety ( walk, weights etc), goes to [place] over the winter months and walks 3 miles daily, swimming daily   Stress:   moderate  Sleep: good  Support: wife  Insulin:  Novolog via pump ~47.5u/ day   BG Testing: ~ 3.6x/ day not testing at evening meal   Routine diabetes care:  up to date with eye, dental, and foot exams - no concerns  Estimated Average Glucose (eAG) 183 mg/dL with A1C of 8.0%     BG avg 161 +/- 100 mg/dL   Avg Daily Basal 14.61 (31%)  Avg Daily Bolus 32.89 (69%)    ICR=   12:00      changed to 9    Sensitivity=  40    Target     110 - 110    Basal: =     14.625 u/ day changed to 14.325    MN-      0.400  3:00 am    0.625    Insulin action  3.5 hours  Max Bolus    17u  Max Basal   1u/ hr       Health Status   Allergies:    Nonallergic Reactions (Selected)  Severity Not Documented  Atorvastatin (Joint pain)   Medications:  (Selected)   Prescriptions  Prescribed  B-D PEN NDL SHRT  [id number](5/16) CHRISTINA: B-D PEN NDL SHRT [id number](5/16) CHRISTINA, See Instructions, Instructions: INJECT FOUR TIMES DAILY AS DIRECTED, Supply, # 400 EA, 3 Refill(s), Pharmacy: [facility], keep on hold and pt will notify when needed, INJECT FOUR TIMES DAILY AS DIRECTED...  CONTOUR TEST STRIPS 100'S: CONTOUR TEST STRIPS 100'S, See Instructions, Instructions: TEST FOUR TIMES DAILY AS DIRECTED, Supply, # 400 EA, 3 Refill(s), Pharmacy: [facility] [id number], keep on hold and pt will notify when needed, TEST FOUR TIMES DAILY AS DIRECTED  Contour next test strips: Contour next test strips, See Instructions, Instructions: testing 4x/ day - new start for insulin pump, Supply, # 8 bottle(s), 3 Refill(s), Type: Maintenance, Pharmacy: [facility] [id number], testing 4x/ day - new start for insulin pump  Contour test strips and lancets: Contour test strips and lancets, See Instructions, Instructions: testing 4 times daily, # 400 EA, 11 Refill(s), Type: Soft Stop, Pharmacy: [facility] [id number]  Glucagon Emergency Kit for Low Blood Sugar 1 mg injection: See Instructions, Instructions: 1mg injection if pt unable to safely consume glucose, # 1 EA, 0 Refill(s), Type: Maintenance, Pharmacy: [facility] [id number], pt will have free micky coupon, 1mg injection if pt unable to safely consume glucose  Glucose tabs: Glucose tabs, See Instructions, Instructions: use as needed for hypoglycemia, # 100 EA, 3 Refill(s), Type: Maintenance, Pharmacy: [facility]  NovoLOG 100 units/mL subcutaneous solution: See Instructions, Instructions: ~70u/ day use with insulin pump, # 60 mL, 3 Refill(s), Type: Maintenance, Pharmacy: [facility] [id number], Pt started Medtronic insulin pump 5/30/17 - insulin should now be covered, ~70u/ day use with insulin pump  ULTRA-FINE III SHORT PEN NEEDL MG INJECTABLE: ULTRA-FINE III SHORT PEN NEEDL MG INJECTABLE, See Instructions, Instructions: injects 4 times daily, # 400 EA, 11  Refill(s), Pharmacy: [facility] [id number]  aspirin 81 mg oral tablet: 1 tab(s) ( 81 mg ), PO, Daily, # 30 tab(s), 0 Refill(s), Type: Maintenance, OTC (Rx)  clobetasol 0.05% topical cream: 1 varsha, TOP, BID, Instructions: apply a thin film; apply to affected rash, # 15 g, 1 Refill(s), Type: Maintenance, Pharmacy: [facility], 1 varsha top bid,Instr:apply a thin film; apply to affected rash  levothyroxine 200 mcg (0.2 mg) oral tablet: 1 tab(s) ( 200 mcg ), po, daily, # 90 tab(s), 3 Refill(s), Pharmacy: [facility], keep on hold and pt will notify when needed, 1 tab(s) po daily  pravastatin 20 mg oral tablet: 1 tab(s) ( 20 mg ), PO, hs, # 90 tab(s), 3 Refill(s), Type: Maintenance, Pharmacy: [facility], keep on file and pt will notify when needed, 1 tab(s) po hs  Documented Medications  Documented  Cinnamon: ( 1,000 mg ), po, daily, 0 Refill(s), Type: Maintenance  Fish Oil: 0  Flax Seed Oil: 0  Metamucil: See Instructions, Instructions: 1 tbsp daily, 0 Refill(s), Type: Maintenance  Multiple Vitamins oral capsule: 1 cap(s), PO, Daily, 0 Refill(s)  Vitamin C: ( 1,000 mg ), daily, 0 Refill(s)   Problem list:    All Problems  Diabetes mellitus type 1 / ICD-9-.01 / Confirmed  Intolerance of drug / SNOMED CT 57545784 / Confirmed  Essential tremor / ICD-9-.1 / Confirmed  Hyperlipidemia / SNOMED CT 30212288 / Confirmed  Hypothyroid / ICD-9-.9 / Confirmed      Histories   Past Medical History:    Active  Hyperlipidemia (80934233)  Hypothyroid (244.9)  Comments:  2010 CST 1:40 PM Kassi Gonzalez CMA  Diabetes mellitus type 1 (250.01)  Comments:  2010 CST 1:41 PM CST - Nikolay Kassi MAHER  2007  Essential tremor (333.1)   Family History:    Diabetes mellitus type I  Father ()  Bowel obstruction  Father ()  [place] - Cancer of uterus  Mother ()     Procedure history:    Screening for malignant neoplasm of colon (SNOMED CT  "5130433277) on 12/7/2015 at 70 Years.  Comments:  1/30/2016 8:15 AM - Adali Pantoja is negative  Sialogram (SNOMED CT 161157319) on 2/1/2006 at 60 Years.  Comments:  3/29/2013 10:58 AM - Yoanna Porras  \"Stone.  Swollen gland.\"  removal of stone, left mandibular gland in the month of 12/2005 at 60 Years.  Sigmoidoscopy (SNOMED CT 80769282) performed by Sky Bui MD on 10/17/2002 at 57 Years.   Social History:        Alcohol Assessment: Low Risk            1-2 times per month, 1 drinks/episode average.      Tobacco Assessment: Past      Substance Abuse Assessment: Denies Substance Abuse      Employment and Education Assessment            Retired, Work/School description: /, [employer].  Previous               employment/school: [employer].      Home and Environment Assessment            Marital status: .  Spouse/Partner name: Vicenta.      Exercise and Physical Activity Assessment: Regular exercise            Exercise frequency: Daily.  Exercise type: Aerobics, Walking, Weight lifting.        Physical Examination   Measurements from flowsheet : Measurements   9/1/2017 5:35 PM CDT Height Measured - Standard 68.5 in    Weight Measured - Standard 176.25 lb    BSA 1.96 m2    Body Mass Index 26.41 kg/m2         Health Maintenance      Recommendations     Pending (in the next year)        OverDue           Tetanus Vaccine due  07/26/12  and every 10  year(s)           DM - Communication with Managing Provider due  05/29/16  and every 1  year(s)           DM - Foot Exam due  05/29/16  and every 1  year(s)           Influenza Vaccine due  11/24/16  and every 1  year(s)           DM - HgbA1c due  03/02/17  and every 3  month(s)        Due            Fall Risk Screen (Male) due  09/01/17  and every 1  year(s)           Lung Cancer Screen (Male) due  09/01/17  and every 1  year(s)        Refused            Zoster/Shingles Vaccine due  " 09/01/17  One-time only        Due In Future            DM - Microalbumin not due until  12/02/17  and every 1  year(s)           Alcohol Misuse Screen (Male) not due until  12/16/17  and every 1  year(s)           Depression Screen (Male) not due until  12/16/17  and every 1  year(s)           Tobacco Use Screen (Male) not due until  12/16/17  and every 1  year(s)           DM - Eye Exam not due until  12/23/17  and every 1  year(s)           High Blood Pressure Screen (Male) not due until  04/20/18  and every 1  year(s)           Body Mass Index Check (Male) not due until  05/30/18  and every 1  year(s)     Satisfied (in the past 1 year)        Satisfied            Alcohol Misuse Screen (Male) on  12/16/16.           Body Mass Index Check (Male) on  05/30/17.           Body Mass Index Check (Male) on  04/18/17.           DM - Eye Exam on  12/23/16.           DM - HgbA1c on  12/02/16.           DM - Microalbumin on  12/02/16.           DM - Microalbumin on  12/02/16.           Depression Screen (Male) on  12/16/16.           Depression Screen (Male) on  12/16/16.           Depression Screen (Male) on  12/16/16.           High Blood Pressure Screen (Male) on  04/20/17.           High Blood Pressure Screen (Male) on  12/16/16.           High Blood Pressure Screen (Male) on  12/16/16.           Pneumococcal Vaccine on  04/21/17.           Tobacco Use Screen (Male) on  12/16/16.          Review / Management   Results review:  Lab results   8/3/2017 7:55 AM CDT Hgb A1c 8.0  HI    Cholesterol 195 mg/dL    Non-    HDL 89 mg/dL    Chol/HDL Ratio 2.2    LDL 98    Triglyceride 38 mg/dL    TSH 1.57 mIU/L   .       Impression and Plan   Diagnosis     Diabetes mellitus type 1 (NSX10-VG E10.9).     Hyperlipidemia NOS (PLT71-ZG E11.69).     Overweight (BMI 25.0-29.9) (JBS83-GO E66.3).       Counseled: Patient, Further instruction on AADE7 Self Care Behaviors; Motivational counseling to help achieve goals and congratulated  pt on significant improvements in lifestyle.    Discussed living well with diabetes, futher information about diabetes disease process, reviewed chronic condition and appropriate treatment and self management  Healthy Eating - Review carbohydrate counting, reading food labels, appropriate portion sizes, well balanced meals, diabetic plate method as a general rule.  Patient is provided with additional magazines with recipes, meal planning ideas to incorporate dietary recommendations, meal planning and preperation.  Education review on decreaseing cardiovascular risk with following Therapeutic Lifestyle Changes (TLC) nutrition plan for heart healthy eating.    Being Active - Education on how exercise helps with : pt will continue to work towards goal    - lowering BG by increasing the muscles ability to take up and use glucose   - weight loss   - healthier heart (improve lipid profile)   - improve sleep, mood, energy   - decrease stress      Monitoring - Reviewed ecommended testing schedule and blood glucose target levels.  Test prior to every meal  Taking Medication - on Novolog   Problem Solving -    Reviewed Pump settings (basal/bolus), menu options and use, bolus wizzard, suspend, temp basel, BG testing recommendations, hypo/hyperglycemia guidelines, infusion set changes and fill and DKA prevention.  medical support team assistance, resources provided (written); good control of stress  Healthy Coping - support of friends, family, and medical support team   Reducing Risks - Education on importance of good glucose control to help reduce the potential for developing microvascular and macrovacular complications associated with high blood glucose over time.    Education on the following complications    *heart attack/cardiovascular disease - prevention with heart healthy diet, daily activity, and weight control   *retinopathy - annual eye exam   *nephropathy - discussion on annual or prn kidney function labs with  urinanalysis    *stroke - maintaining recommended glucose control   *peripheral aterial disease - regular activity, maintaining recommended glucose control   *neuropathy - monofiliment testing, regular activity, maintaining recommended glucose control  Appropriate foot care education  Vaccinations as recommended influenza, pneumonia etc.   Routine dental appts for prevention of periodontal diseases   Importance of adequate sleep   Good skin care, monitor for any open areas, sores, or cuts.         Pt able to verbalize understanding of above education, handouts provided for additional resources.       Goals:   1.  Practice healthy stress management and get good quality sleep with the goal of 7-8 hours per night.    2.   Physical activity: Recommend 30 min of physical activity on 5 or more days/ week.    3.  Eat in a healthy way, per diabetic plate method.  Nutrition reference: Eat 3 meals a day; snacks are optional.  A meal is three or more food groups; make it colorful for better nutrition.    4. Count carbohydrates and use bolus wizzard for all meals and snacks   5.  BG testing4+x/ daywith pump start Goal premeals 75 - 120; 2 hrs after meals 75 - 140   6.  Be aware of s/sx of hypo/ hyperglycemia and follow treatment protocol   7.  Always have back up pump supplies on hand and vial/ syringe with battery and glucose tablets   8.  follow up in 3 months/ prn       Professional Services   Time spent with pt 60 min   cc Dr. Kb Mcqueen

## 2022-02-15 NOTE — PROGRESS NOTES
Patient:   ROSALIO ANDRADE            MRN: [id number]            FIN: [id number]               Age:   76 years     Sex:  Male     :  1945   Associated Diagnoses:   Bundle branch block   Author:   Kb Mcqueen MD      Procedure   EKG procedure   Indication: pre-op.     Position: supine.     EKG findings   Interpretation: by primary care provider.     Rhythm: heart rate  66  beats/min, sinus normal.     Axis: normal axis, normal configuration.     Within normal limits.     Intervals: SD normal, QRS normal, QT normal.     Normal EKG.     P waves: normal.     QRS complex: intraventricular conduction delay; QRS 116ms, no Q waves present.     ST-T-U complex: normal.     Interpretation: no ST-T wave abnormalities.     Discussed: with patient.        Impression and Plan   Diagnosis     Bundle branch block (FZT10-PB I45.4).     Orders

## 2022-02-15 NOTE — TELEPHONE ENCOUNTER
Entered by Kb Mcqueen MD on September 17, 2019 11:42:03 AM CDT  ---------------------  From: Kb Mcqueen MD   To: [facility] [id number] [facility] [place]    Sent: 9/17/2019 11:42:03 AM CDT  Subject: Medication Management     ** Approved **  Order:Miscellaneous Prescription (CONTOUR NEXT TEST STRIP)  TEST 4 TIMES A DAY  Qty:  400 unknown unit        Days Supply:  90        Refills:  3          Substitutions Allowed     Route To Pharmacy - [id number] [place]   Note from Pharmacy:  Alternative Requested:PLEASE SEND NEW RX FOR 3 TI EMS PER DAY TESTING- ONLY WAY MEDICARE PART B WILL POAY  Signed by Kb Mcqueen MD            From: [facility] [id number]  To: Kb Mcqueen MD  Sent: September 16, 2019 11:33:03 AM CDT  Subject: Medication Management  Due: September 17, 2019 11:33:03 AM CDT     Originally Prescribed Drug:  Drug: CONTOUR NEXT TEST STRIP, TEST 4 TIMES A DAY  Quantity: 400 unknown unit  Days Supply: 90  Refills: 3  Substitutions Allowed  Notes from Pharmacy: Alternative Requested:PLEASE SEND NEW RX FOR 3 TI EMS PER DAY TESTING- ONLY WAY MEDICARE PART B WILL POAY     ** On Hold Pending Signature **  Preferred Alternative Drug: CONTOUR NEXT TEST STRIP, TEST 4 TIMES A DAY  Quantity: 400 unknown unit  Days Supply: 90  Refills: 3  Substitutions Allowed  Notes from Pharmacy: Alternative Requested:PLEASE SEND NEW RX FOR 3 TI EMS PER DAY TESTING- ONLY WAY MEDICARE PART B WILL POAY

## 2022-02-15 NOTE — PROGRESS NOTES
Patient:   ROSALIO ANDRADE            MRN: [id number]            FIN: [id number]               Age:   76 years     Sex:  Male     :  1945   Associated Diagnoses:   Hypothyroid; Hyperlipidemia; Diabetes mellitus type 1; Tremor, Essential   Author:   Kb Mcqueen MD      Visit Information      Date of Service: 2021 01:25 pm  Performing Location: [facility]  Encounter#: [id number]      Primary Care Provider (PCP):  Kb Mcqueen MD    NPI# [id number]      Referring Provider:  Kb Mcqueen MD    NPI# [id number]      Chief Complaint   2021 1:41 PM CDT     1.  check left wrist - had a fall in March while in [place], landed on out stretched hand   2.  ? suture left from Rotator cuff repair in May              Additional Information:No additional information recorded during visit.   Chief complaint and symptoms as noted above and confirmed with patient      History of Present Illness   4/10/2014: Rosalio presents to clinic to establish care, previously followed by [facility] approximately the last 10 yrs. he s been diagnosed with adult onset type 1 diabetes, insulin managed with a combination of basal bolus insulin therapy.  Historically his glycemic control has not been optimal. Is very knowledgeable about carbohydrate counting and insulin to carbohydrate ratios.  He says that his main nemesis is evening snacking.  He will typically bolus with NovoLog when eating popcorn, otherwise does not bolus with snacking.  He has no known diabetic complications.  He remains very active with daily exercise, primarily swimming.  He swam back in college.  He resides in [place] for a portion of the year.  He s been resistant to starting on cholesterol medicines in the past.  Also with complaints of left sided elbow/forearm pains - not improved with forearm brace.  He is a retired orchestral director and plays base cello - arm pains limiting his abilities.    2020:  Rosalio returns to clinic for follow-up.  He was started  on a continuous glucose monitor in September of this year.  Overall has found it helpful especially for eliminating his evening lows.  Still challenging to maintain daytime euglycemia though denies having any further hypoglycemia.  Planning on leaving for [place] in early January.  Has missed being more physically active all appear.  Feeling well otherwise.  No identified Covid exposures.  4/23/2021: Irwin presents for preoperative assessment for planned left rotator cuff tear repair scheduled for the beginning of May.  He says he had a fall event in [place] in April falling on outstretched arm.  Worked with physical therapy without symptom improvement.  No other specific health related issues or concerns.  He is received his first Covid series vaccination without issues, second dose scheduled for this coming week.  6/2/2021: Irwin returns to clinic with complaints of persistent left wrist pain.  Originally fell on outstretched hand on March 17 in [place] resulting in an acute left rotator cuff tear.  He had associated wrist pain at that time though did not have any radiographic evaluation.  This past month did have rotator cuff repair which went well.  Still bothered though by persistent pain in his left wrist and was advised by his physical therapist to have this further evaluated.  Also having local irritation abrasion along previous suture sites of his left shoulder.  Questions of there is a retained suture fragment.         Review of Systems   Constitutional:  No fever, No chills.    Eye:  Negative except as documented in history of present illness.    Ear/Nose/Mouth/Throat:  Negative except as documented in history of present illness.    Respiratory:  No shortness of breath.    Cardiovascular:  No chest pain, No palpitations, No peripheral edema, No syncope.    Gastrointestinal:  No nausea, No vomiting, No abdominal pain.    Genitourinary:  No dysuria, No hematuria.    Hematology/Lymphatics:  Negative except as  documented in history of present illness.    Endocrine:  No excessive thirst, No polyuria.    Immunologic:  No recurrent fevers.    Musculoskeletal:  Joint pain, Decreased range of motion, Trauma, No muscle pain.    Neurologic:  Alert and oriented X4, tremor, No numbness, No tingling, No headache.       Health Status   Allergies:    Allergic Reactions (Selected)  No known allergies   Medications:  (Selected)   Prescriptions  Prescribed  CONTOUR NEXT TEST STRIP: See Instructions, Instructions: TEST 4 TIMES A DAY, # 400 unknown unit, 3 Refill(s), Type: Maintenance, Pharmacy: [id number], TEST 4 TIMES A DAY  CONTOUR NEXT TEST STRIPS: CONTOUR NEXT TEST STRIPS, See Instructions, Instructions: pt testing 4 times daily- pt has a pump and needing to test more often due to fluctuating BS's - Dx - Z96.41 and E10.9, Supply, # 400 EA, 3 Refill(s), Type: Maintenance, Pharmacy: [facility]  Dexcom G6 : Dexcom G6 , See Instructions, Instructions: use to see glucose readings, Supply, # 1 EA, 1 Refill(s), Type: Maintenance  Dexcom G6 sensors: Dexcom G6 sensors, See Instructions, Instructions: change sensor every 10 days  E10.9, Supply, # 9 EA, 11 Refill(s), Type: Maintenance  Dexcom G6 transmitter: Dexcom G6 transmitter, See Instructions, Instructions: change every 3 months, Supply, # 1 EA, 3 Refill(s), Type: Maintenance  Glucagon Emergency Kit for Low Blood Sugar 1 mg injection: See Instructions, Instructions: 1mg injection if pt unable to safely consume glucose, # 1 EA, 0 Refill(s), Type: Maintenance, Pharmacy: [facility], keep on hold, 1mg injection if pt unable to safely consume glucose  Glucose tabs: Glucose tabs, See Instructions, Instructions: use as needed for hypoglycemia, # 100 EA, 3 Refill(s), Type: Maintenance, Pharmacy: [facility]  NovoLOG 100 units/mL injectable solution: See Instructions, Instructions: INJECT UP TO 70 UNITS DAILY VIA PUMP-  Dx E10.9, # 90 mL, 3 Refill(s),  Type: Maintenance, Pharmacy: [facility], INJECT UP TO 70 UNITS DAILY VIA PUMP-  Dx E10.9, 67, in, 12/01/20 8:34:00 CST, Height Measured, 191.4, lb...  ULTRA-FINE III SHORT PEN NEEDL MG INJECTABLE: ULTRA-FINE III SHORT PEN NEEDL MG INJECTABLE, See Instructions, Instructions: injects 4 times daily, # 400 EA, 11 Refill(s), Pharmacy: [id number]  aspirin 81 mg oral tablet: 1 tab(s) ( 81 mg ), PO, Daily, # 30 tab(s), 0 Refill(s), Type: Maintenance, OTC (Rx)  levothyroxine 175 mcg (0.175 mg) oral tablet: = 1 tab(s) ( 175 mcg ), Oral, daily, # 90 tab(s), 3 Refill(s), Type: Maintenance, Pharmacy: [facility], 1 tab(s) Oral daily, 67, in, 12/01/20 8:34:00 CST, Height Measured, 191.4, lb, 12/01/20 8:34:00 CST, Weight Measured  pravastatin 20 mg oral tablet: See Instructions, Instructions: TAKE 1 TABLET BY MOUTH ONCE DAILY AT BEDTIME, # 90 unknown unit, 3 Refill(s), Pharmacy: [facility], TAKE 1 TABLET BY MOUTH ONCE DAILY AT BEDTIME, 67, in, 12/01/20 8:34:00 CST, Height Measured, 191.4, lb, 12/01/20 8:34...  Documented Medications  Documented  Metamucil: Oral, 0 Refill(s), Type: Maintenance,    Medications          *denotes recorded medication          ULTRA-FINE III SHORT PEN NEEDL MG INJECTABLE: See Instructions, injects 4 times daily, 400 EA.          CONTOUR NEXT TEST STRIP: See Instructions, TEST 4 TIMES A DAY, 400 unknown unit, 3 Refill(s).          CONTOUR NEXT TEST STRIPS: See Instructions, pt testing 4 times daily- pt has a pump and needing to test more often due to fluctuating BS's - Dx - Z96.41 and E10.9, 400 EA, 3 Refill(s).          Glucose tabs: See Instructions, use as needed for hypoglycemia, 100 EA.          Dexcom G6 : See Instructions, use to see glucose readings, 1 EA, 1 Refill(s).          Dexcom G6 sensors: See Instructions, change sensor every 10 days  E10.9, 9 EA, 11 Refill(s).          Dexcom G6 transmitter: See Instructions, change every 3 months, 1 EA, 3 Refill(s).           aspirin 81 mg oral tablet: 81 mg, 1 tab(s), PO, Daily, 30 tab(s).          Glucagon Emergency Kit for Low Blood Sugar 1 mg injection: See Instructions, 1mg injection if pt unable to safely consume glucose, 1 EA, 0 Refill(s).          NovoLOG 100 units/mL injectable solution: See Instructions, INJECT UP TO 70 UNITS DAILY VIA PUMP-  Dx E10.9, 90 mL, 3 Refill(s).          levothyroxine 175 mcg (0.175 mg) oral tablet: 175 mcg, 1 tab(s), Oral, daily, 90 tab(s), 3 Refill(s).          pravastatin 20 mg oral tablet: See Instructions, TAKE 1 TABLET BY MOUTH ONCE DAILY AT BEDTIME, 90 unknown unit, 3 Refill(s).          *Metamucil: Oral, 0 Refill(s).       Problem list:    All Problems  Intolerance of drug / SNOMED CT 37213873 / Confirmed  Essential tremor / SNOMED CT 0902192681 / Confirmed  Hyperlipidemia / SNOMED CT 90741832 / Confirmed  Hypothyroid / SNOMED CT 18521442 / Confirmed  Insulin pump in place / SNOMED CT 1747717953 / Confirmed  Diabetes mellitus type 1 / SNOMED CT 513158130 / Confirmed  Canceled: Long term (current) use of insulin / SNOMED CT 000025100      Histories   Past Medical History:    Active  Hyperlipidemia (16679984)  Hypothyroid (53475191)  Comments:  2010 CST 1:40 PM Kassi Gonzalez CMA  2007  Diabetes mellitus type 1 (034854325)  Comments:  2010 CST 1:41 PM Kassi Gonzalez CMA  2007  Essential tremor (4607393549)   Family History:    Diabetes mellitus type I  Father ()  Bowel obstruction  Father ()  CA - Cancer of uterus  Mother ()     Procedure history:    Rotator cuff repair ([id number]) on 5/3/2021 at 76 Years.  Comments:  2021 12:22 PM Marianna Ryan  Left.  Colonoscopy (343107345) on 2019 at 74 Years.  Comments:  2020 8:52 AM Marianna Ryan  Indication: Positive Cologuard.             Sedation: MAC.    2019 9:23 AM CDT - [place] CMA, Malika  Tubular adenoma - negative for high grade dysplasia - Repeat in 3years  Screening for  "colon cancer (0124933663) on 5/20/2019 at 74 Years.  Comments:  6/3/2019 4:46 PM CDT - Jagjit ROCHA, Yoanna Fowler result:  positive  Recommendation:  needs colonoscopy  Screening for malignant neoplasm of colon (3801022842) on 12/7/2015 at 70 Years.  Comments:  1/30/2016 8:15 AM CST - Adali Pantoja is negative  Sialogram (631532153) on 2/1/2006 at 60 Years.  Comments:  3/29/2013 10:58 AM CDT - Yoanna Porras  \"Stone.  Swollen gland.\"  removal of stone, left mandibular gland in the month of 12/2005 at 60 Years.  Sigmoidoscopy (42244637) on 10/17/2002 at 57 Years.  Insulin pump, device ([id number]).   Social History:        Electronic Cigarette/Vaping Assessment            Electronic Cigarette Use: Never.      Alcohol Assessment            1-2 times per month, 1 drinks/episode average.      Tobacco Assessment            Former smoker, quit more than 30 days ago, Cigarettes      Employment and Education Assessment            Retired, Work/School description: /, [employer].  Previous               employment/school: [employer].      Home and Environment Assessment            Marital status: .  Spouse/Partner name: Vicenta.      Exercise and Physical Activity Assessment            Exercise frequency: Daily.  Exercise type: Aerobics, Walking, Weight lifting.        Physical Examination   vital signs stable, as noted above   Vital Signs   6/2/2021 1:41 PM CDT Temperature Tympanic 98.3 DegF    Peripheral Pulse Rate 81 bpm    Systolic Blood Pressure 132 mmHg  HI    Diastolic Blood Pressure 78 mmHg    Mean Arterial Pressure 96 mmHg    Oxygen Saturation 99 %      Measurements from flowsheet : Measurements   6/2/2021 1:41 PM CDT Height Measured - Standard 67 in    Height/Length Estimated 73 cm    Weight Measured - Standard 183.5 lb    BSA 1.98 m2    Body Mass Index 28.74 kg/m2  HI      General:  Alert and oriented, No acute distress.  "   Respiratory:  Lungs are clear to auscultation, Respirations are non-labored.    Cardiovascular:  Normal rate, Regular rhythm, No murmur, No edema.    Gastrointestinal:  Wearing insulin pump.    Musculoskeletal:  Normal range of motion, Normal strength, well healed incision sites along left shoulder - extruding suture fragment from anterior trocar site., Induced pains with wrist flexion.  Pains reproducible along dorsal aspect of wrist near base of 4th finger.    Neurologic:  Alert, Oriented, Normal motor function, No focal deficits.    Cognition and Speech:  Oriented, Speech clear and coherent.    Psychiatric:  Appropriate mood & affect.       Review / Management   Results review:  Lab results   4/23/2021 12:21 PM CDT Sodium Level 137 mmol/L    Potassium Level 4.2 mmol/L    Chloride Level 102 mmol/L    CO2 Level 29 mmol/L    Glucose Level 159 mg/dL  HI    BUN 18 mg/dL    Creatinine 0.94 mg/dL    BUN/Creat Ratio NOT APPLICABLE    eGFR 79 mL/min/1.73m2    eGFR African American 92 mL/min/1.73m2    Calcium Level 9.4 mg/dL    Hgb A1c 8.5  HI    TSH 1.21 mIU/L    U Creatinine 97 mg/dL    U Microalbumin 0.6 mg/dL    Ur Microalb/Creat Ratio 6    WBC 9.4    RBC 4.60    Hgb 15.4 gm/dL    Hct 44.8 %    MCV 97.4 fL    MCH 33.5 pg  HI    MCHC 34.4 gm/dL    RDW 13.7 %    Platelet 236    MPV 10.2 fL   .       Impression and Plan   Diagnosis     Hypothyroid (MDI13-QL E03.9).     Hyperlipidemia (OMO85-JT E78.5).     Diabetes mellitus type 1 (ZIG24-RP E10.9).     Tremor, Essential (FPL58-KU G25.0).       .)  left shoulder arthroscopic repair of rotator cuff tear, on 5/3/2021 with Dr. Paul   - participating in rehab and doing well   - questionable suture fragment - clipped today here in clinic    .) chronic left wrist pains - present ever since FOOSH injury on 3/17/2021 (same injury causing rotator cuff tear)   - no orginal wrist imaging obtained at time of fall   - Xray of wrist obtained today - no definitive fracture per my  read - await radiology input   - given persistent pains for >2 months, I'm more inclined to have ortho hand evaluate - await xrays   - concerns on how he will tolerate PT with planned hand weights in near future    plan as previously outlined and not discussed at today's visit     .) type I DM, uncontrolled, late onset diabetes in adulthood (ROLANDO)  hypoglycemic medications:  insulin therapy:  Medtronic pump (initiated in '17), recently started on Dexcom6 CGM (9/2020)  ICR=   12:00     7.5    Sensitivity=  0:00  40       Target     120 - 120     Basal:  16.05  MN   0.575  3am   0.655    6:30am  0.850    13:00 0.850    22:00  0.550      Insulin action  3.5 hours  Max Bolus    17u  Max Basal   1u/ hr    last HbA1c: 8.2%   microvascular complications: none  macrovascular complications: none  ASA/SIMON/statin:  ASA 81mg daily, pravastatin 20mg qhs    .) hypothyroidism   -  levothyroxine to 175mcg daily    .) ED   - [facility] prn    health maintenance   -Cologuard positive(5/2019); colonoscopy (6/2019); multiple adenomatous polyps - repeat study in 3 yrs   - PSA 0.3 (11/2015) - declines regular future screening   - intentional tremor (worse in AM) - suspect essential tremor; likely worsened by caffeine.  Low suspicions for parkinsons   - completed COVID vaccination series #1    RTC as previously scheduled

## 2022-02-15 NOTE — LETTER
(Inserted Image. Unable to display)         December 10, 2020        ROSLAIO MODI [place], [place] [id number]        Dear ROSALIO,    Thank you for selecting [facility] for your healthcare needs.    Your Healthcare Provider has recommended that you schedule a diabetes management appointment with our Certified Diabetes Educator, Rupal White RD, CD, CDE.     Please bring your glucose meter and your blood glucose diary to your appointment.    Available services include:  - Education about diabetes with guidance and support   - Education on the 7 Self Care Behaviors for Diabetes Management:     Healthy Eating   portion control, label readings, carbohydrate recommendations, heart healthy guidelines, meal planning    Being Active - Physical activity guidelines     Monitoring   blood glucose targets, evaluation of blood glucose readings and lab results    Taking Medication   medication management    Problem Solving   discuss any concerns/ questions     Healthy Coping   disease progression and management    Reducing Risks   prevention strategies to help avoid potential complications related to uncontrolled diabetes  - Weight loss strategies      (FYI   Regarding office visit: In some instances, a video visit may be offered as an option.)    To schedule an appointment or if you have further questions, please contact your clinic at [phone].    Powered by [employer]    Sincerely,    Rupal White RD, CD, CDE

## 2022-02-15 NOTE — NURSING NOTE
Hearing and Vision Screening Entered On:  11/23/2021 9:37 AM CST    Performed On:  11/23/2021 9:37 AM CST by Malika Joya CMA               Hearing and Vision Screening   Audiogram Result Right Ear :   Fail   Audiogram Result Left Ear :   Fail   Hearing Screen Comments :   missed 4000hz in BL ears - seen ay audiology   Malika Joya CMA - 11/23/2021 9:37 AM CST

## 2022-02-15 NOTE — PROGRESS NOTES
Patient:   ROSALIO ANDRADE            MRN: [id number]            FIN: [id number]               Age:   74 years     Sex:  Male     :  1945   Associated Diagnoses:   None   Author:   Rupal White      Doctor: Kb Mcqueen   Patient Information  (Medicare and address information is on file)  Medicare HICN#: _  Address:_ City:_ State:_ Zip:_  Home Phone:_ Work Phone:_ Other Contact Phone:_    Diabetes self-management training (DSMT) and medical nutrition therapy (MNT) are individual and complementary services to improve diabetes care. For Medicare beneficiaries, both services can be ordered in the same year. Research indicates MNT combined with DSMT improves outcomes.    Diabetes Self-Managment Training (DSMT)  Medicare: 10 hours initial DSMT in 12 month period, plus 2 hours follow-up annually  *Check type of training services and number of hours requested:  _ Initial DSMT:  10 hours or _ no. hrs. requested  X Follow-up DSMT: X 2 hours or _ no. hrs. requested  PLUS X Additional insulin trainin no. hrs. requested for insulin pump education     *Patients with special needs requiring individual DSMT  Check all special needs that apply:  _ Vision _ Hearing  _ Physical  _Cognitive Impairment  _ Language limitations X Other  No classes offered    *DSMT Content  X All ten content areas, as appropriate  _ Monitoring diabetes   _ Diabetes as disease process  _ Psychological adjustment _ Physical activity  _ Nutritional management  _ Goal setting, problem solving  _ Medications     _ Prevent, detect and treat acute complications  _ Preconception/pregnancy _ Prevent, detect and teat chronic complications     management or gestational     diabetes management    Medical Nutrition Therapy (MNT)  Medicare: 3 hours initial MNT in the first calendar year, plus two hours follow-up MNT annually. Additional MNT hours available for change in medical condition, treament and/or diagnosis.  *Check the type of MNT and/or number of  additional hours requested:  _ Initial MNT: X Annual follow-up MNT  _ Additional MTNservices in the same calendar year, per RD recommendations  _ no. additional hrs. requested    Please specify change in medical condition, treatment and/or diagnosis      *Diagnosis  Please send recent labs for patient eligibility & outcomes monitoring  X Type 1 uncontrolled _ Type 1 controlled  _ Type 2 uncontrolled _ Type 2 controlled  _ Gestational diabetes _ Other _    Complications/Comorbidities  Check all that apply:  _ Hypertension   X Dyslipidemia  _ Stroke  _ Neuropathy   _ Nephropathy  _ PVD  _ Renal disease   _ Retinopathy  _ CHD  _ Non-healing wound _ Pregnancy   _ Obesity  _ Mental/affective disorder      _ Other _    Current Diabetes Medications  Specify type, dose and frequency  Oral: _  Insulin: Insulin Pump Medtronic   Patient now uses: _ Pen _ Needle X Pump    Patient Behavior Goals/Plan of Care    To improve blood sugar control to help prevent potential complications associated with uncontrolled diabetes, while minimizing the risk for hypoglycemia.  Education on insulin pump.      Signature and UPIN #: (UPIN and NPI are on file)  Group/Practice name, address and phone: [facility], [facility]  [id number] [phone]

## 2022-02-15 NOTE — TELEPHONE ENCOUNTER
---------------------  From: Litzy Del Toro LPN (Phone Messages Pool ([id number]_[facility]))   To: [facility] Message Pool ([id number]_[place]);     Sent: 6/7/2019 4:02:35 PM CDT  Subject: CONSUMER MESSAGE FW: Colonoscopy           ---------------------  From: ROSALIO ANDRADE  To: [facility]  Sent: 06/07/2019 03:58 p.m. CDT  Subject: Colonoscopy  Merrill Daly,  The only person I spoke to was you. I would like to have it done in .  Rosalio.---------------------  From: Malika Joya CMA ([facility] Message Pool ([id number]_[facility]))   To: ROSALIO ANDRADE    Sent: 6/7/2019 4:05:17 PM CDT  Subject: FW: CONSUMER MESSAGE FW: Colonoscopy     Thanks Rosalio, I will let our referral group know and have the  from [place] give you a call next wk to schedule.    Have a good weekend!    Malika

## 2022-02-15 NOTE — PROGRESS NOTES
Patient:   ROSALIO ANDRADE            MRN: [id number]            FIN: [id number]               Age:   75 years     Sex:  Male     :  1945   Associated Diagnoses:   Diabetes mellitus type 1; Hyperlipidemia NOS; Overweight (BMI 25.0-29.9)   Author:   Rupal White      Visit Information   Visit type:  Medical Nutrition Therapy for Diabetes Management.    Referral source:  Richar RIZZO, Kb.       Chief Complaint   Uncontrolled DM Type 1 (ROLANDO), Hyperlipidemia, Overweight       History of Present Illness   Discussed nutrition, diabetes, and lifestyle management with pt.  Pt started Medtronic 630G pump on 17. Pt started Dexcom G6 CGMS 2020    a1c 8.2% = 189 eAG slight improvement; above pt's personal target of <8% and higher than ADA goal of <7%.    Pt really likes the Dexcom G6, benefits from the low alarms and states he can treat a low when the alarm goes off   Nutrition:  does not follow a low carb plan, enjoys a wide range of food  Physical Activity: decreased with the pandemic, difficult time getting into the pool because of the amount of people allowed, doing weights a few times a week and walking 3 miles/ day    Stress:   moderate, will be going to [facility] - March  Sleep: good  Support: wife  Insulin:  Novolog via pump    BG Testing: ~ 4+x/ day if no sensor available otherwise uses sensor   Routine diabetes care:  up to date with eye (2020), dental, and foot exams - no concerns    ICR  12:00     6.5 changed to 6  15:00     6.5     Sensitivity  0:00  40 changed to 37    Target   120 - 120     Basal:   17.863u/ day increased to 18.1  MN   0.500    3am   0.625 0.70    6:30am  0.850 0.90   13:00 0.850 0.90  added 18:00  0.70   22:00  0.50    Insulin action  3.5 hours  Max Bolus    17u  Max Basal   1u/ hr     30 day Average sensor glucose 175 mg/dL (99% CGM active)     Time in target range 70 - 180  57.2% (goal 70%+)  High 181 - 250    42%  Very High >250    11.6%  Low 54 - 69     0.8%  Very low  <54     0.0%    Basal 37%  Bolus 63%    Total daily dose 48.5u +/- 3.3u         Health Status   Allergies:    Nonallergic Reactions (Selected)  Severity Not Documented  Atorvastatin (Joint pain)   Medications:  (Selected)   Prescriptions  Prescribed  CONTOUR NEXT TEST STRIP: See Instructions, Instructions: TEST 4 TIMES A DAY, # 400 unknown unit, 3 Refill(s), Type: Maintenance, Pharmacy: [facility] STORE [id number], TEST 4 TIMES A DAY  CONTOUR NEXT TEST STRIPS: CONTOUR NEXT TEST STRIPS, See Instructions, Instructions: pt testing 4 times daily- pt has a pump and needing to test more often due to fluctuating BS's - Dx - Z96.41 and E10.9, Supply, # 400 EA, 3 Refill(s), Type: Maintenance, Pharmacy: [facility],...  Dexcom G6 : Dexcom G6 , See Instructions, Instructions: use to see glucose readings, Supply, # 1 EA, 1 Refill(s), Type: Maintenance, Pharmacy: [facility], use to see glucose readings, 68.5, in, 07/09/20 10:12:00 CDT, Height Measured, 186.6, lb, 07/09/20...  Dexcom G6 sensors: Dexcom G6 sensors, See Instructions, Instructions: change sensor every 10 days  E10.9, Supply, # 9 EA, 11 Refill(s), Type: Maintenance, Pharmacy: [facility], change sensor every 10 days  E10.9, 67, in, 12/01/20 8:34:00 CST, Height Measured, 191.4, l...  Dexcom G6 transmitter: Dexcom G6 transmitter, See Instructions, Instructions: change every 3 months, Supply, # 1 EA, 3 Refill(s), Type: Maintenance, Pharmacy: [facility], change every 3 months, 68.5, in, 07/09/20 10:12:00 CDT, Height Measured, 186.6, lb, 07/09/20 10:12:00...  Glucagon Emergency Kit for Low Blood Sugar 1 mg injection: See Instructions, Instructions: 1mg injection if pt unable to safely consume glucose, # 1 EA, 0 Refill(s), Type: Maintenance, Pharmacy: [id number], keep on hold, 1mg injection if pt unable to safely consume glucose  Glucose tabs: Glucose tabs, See Instructions, Instructions: use as needed for hypoglycemia, # 100 EA, 3 Refill(s),  Type: Maintenance, Pharmacy: [facility]  NovoLOG 100 units/mL injectable solution: See Instructions, Instructions: INJECT UP TO 70 UNITS DAILY VIA PUMP-  Dx E10.9, # 90 mL, 3 Refill(s), Type: Maintenance, Pharmacy: [facility], INJECT UP TO 70 UNITS DAILY VIA PUMP-  Dx E10.9, 67, in, 12/01/20 8:34:00 CST, Height Measured, 191.4, lb...  ULTRA-FINE III SHORT PEN NEEDL MG INJECTABLE: ULTRA-FINE III SHORT PEN NEEDL MG INJECTABLE, See Instructions, Instructions: injects 4 times daily, # 400 EA, 11 Refill(s), Pharmacy: [facility] [id number]  aspirin 81 mg oral tablet: 1 tab(s) ( 81 mg ), PO, Daily, # 30 tab(s), 0 Refill(s), Type: Maintenance, OTC (Rx)  levothyroxine 175 mcg (0.175 mg) oral tablet: = 1 tab(s) ( 175 mcg ), Oral, daily, # 90 tab(s), 3 Refill(s), Type: Maintenance, Pharmacy: [facility], 1 tab(s) Oral daily, 67, in, 12/01/20 8:34:00 CST, Height Measured, 191.4, lb, 12/01/20 8:34:00 CST, Weight Measured  pravastatin 20 mg oral tablet: See Instructions, Instructions: TAKE 1 TABLET BY MOUTH ONCE DAILY AT BEDTIME, # 90 unknown unit, 3 Refill(s), Pharmacy: [facility], TAKE 1 TABLET BY MOUTH ONCE DAILY AT BEDTIME, 67, in, 12/01/20 8:34:00 CST, Height Measured, 191.4, lb, 12/01/20 8:34...  Documented Medications  Documented  Metamucil: Oral, 0 Refill(s), Type: Maintenance,    Medications          *denotes recorded medication          ULTRA-FINE III SHORT PEN NEEDL MG INJECTABLE: See Instructions, injects 4 times daily, 400 EA.          CONTOUR NEXT TEST STRIP: See Instructions, TEST 4 TIMES A DAY, 400 unknown unit, 3 Refill(s).          CONTOUR NEXT TEST STRIPS: See Instructions, pt testing 4 times daily- pt has a pump and needing to test more often due to fluctuating BS's - Dx - Z96.41 and E10.9, 400 EA, 3 Refill(s).          Glucose tabs: See Instructions, use as needed for hypoglycemia, 100 EA.          Dexcom G6 transmitter: See Instructions, change every 3 months, 1 EA, 3 Refill(s).          Dexcom  G6 : See Instructions, use to see glucose readings, 1 EA, 1 Refill(s).          Dexcom G6 sensors: See Instructions, change sensor every 10 days  E10.9, 9 EA, 11 Refill(s).          aspirin 81 mg oral tablet: 81 mg, 1 tab(s), PO, Daily, 30 tab(s).          Glucagon Emergency Kit for Low Blood Sugar 1 mg injection: See Instructions, 1mg injection if pt unable to safely consume glucose, 1 EA, 0 Refill(s).          NovoLOG 100 units/mL injectable solution: See Instructions, INJECT UP TO 70 UNITS DAILY VIA PUMP-  Dx E10.9, 90 mL, 3 Refill(s).          levothyroxine 175 mcg (0.175 mg) oral tablet: 175 mcg, 1 tab(s), Oral, daily, 90 tab(s), 3 Refill(s).          pravastatin 20 mg oral tablet: See Instructions, TAKE 1 TABLET BY MOUTH ONCE DAILY AT BEDTIME, 90 unknown unit, 3 Refill(s).          *Metamucil: Oral, 0 Refill(s).       Problem list:    All Problems  Intolerance of drug / SNOMED CT 28840364 / Confirmed  Essential tremor / SNOMED CT 8649504734 / Confirmed  Hyperlipidemia / SNOMED CT 50625234 / Confirmed  Hypothyroid / SNOMED CT 24109244 / Confirmed  Insulin pump in place / SNOMED CT 1283297759 / Confirmed  Diabetes mellitus type 1 / SNOMED CT 884220368 / Confirmed  Canceled: Long term (current) use of insulin / SNOMED CT 693798317      Histories   Past Medical History:    Active  Hyperlipidemia (66412701)  Hypothyroid (99375456)  Comments:  2010 CST 1:40 PM Kassi Gonzalez CMA  2007  Diabetes mellitus type 1 (901742970)  Comments:  2010 CST 1:41 PM Kassi Gonzalez CMA  2007  Essential tremor (2188871837)   Family History:    Diabetes mellitus type I  Father ()  Bowel obstruction  Father ()  [place] - Cancer of uterus  Mother ()     Procedure history:    Colonoscopy (SNOMED CT 801528272) performed by Mikhail Dawson MD on 2019 at 74 Years.  Comments:  2020 8:52 AM CDT - Marianna Villanueva  Indication: Positive Cologuard.             Sedation:  "MAC.    9/20/2019 9:23 AM CDT - Mahnaz Malika MAHER  Tubular adenoma - negative for high grade dysplasia - Repeat in 3years  Screening for colon cancer (SNOMED CT 0132554775) performed by Kb Mcqueen MD on 5/20/2019 at 74 Years.  Comments:  6/3/2019 4:46 PM CDT - Yoanna Danielson MA result:  positive  Recommendation:  needs colonoscopy  Screening for malignant neoplasm of colon (SNOMED CT 4779809152) on 12/7/2015 at 70 Years.  Comments:  1/30/2016 8:15 AM CST - Adali Pantoja is negative  Sialogram (SNOMED CT 753002441) on 2/1/2006 at 60 Years.  Comments:  3/29/2013 10:58 AM CDT - Yoanna Porras  \"Stone.  Swollen gland.\"  removal of stone, left mandibular gland in the month of 12/2005 at 60 Years.  Sigmoidoscopy (SNOMED CT 54846116) performed by Sky Bui MD on 10/17/2002 at 57 Years.  Insulin pump, device (SNOMED CT 1431858010).   Social History:        Electronic Cigarette/Vaping Assessment            Electronic Cigarette Use: Never.      Alcohol Assessment            1-2 times per month, 1 drinks/episode average.      Tobacco Assessment            Former smoker, quit more than 30 days ago, Cigarettes      Employment and Education Assessment            Retired, Work/School description: /, [employer].  Previous               employment/school: [employer].      Home and Environment Assessment            Marital status: .  Spouse/Partner name: Vicenta.      Exercise and Physical Activity Assessment            Exercise frequency: Daily.  Exercise type: Aerobics, Walking, Weight lifting.        Physical Examination   Measurements from flowsheet : Measurements   12/17/2020 12:45 PM CST Height Measured - Standard 67 in    Height/Length Estimated 73 cm    Weight Measured - Standard 190.2 lb    BSA 2.02 m2    Body Mass Index 29.79 kg/m2  HI         Review / Management   Results review:  Lab results   11/24/2020 8:33 AM CST Hgb " A1c 8.2  HI    Cholesterol 211 mg/dL  HI    Non-    HDL 86 mg/dL    Chol/HDL Ratio 2.5      HI    Triglyceride 58 mg/dL    TSH 0.25 mIU/L  LOW   .       Impression and Plan   Diagnosis     Diabetes mellitus type 1 (OEF76-MJ E10.9).     Hyperlipidemia NOS (WWP69-HL E11.69).     Overweight (BMI 25.0-29.9) (EGG48-SF E66.3).       Counseled: Patient, Further instruction on AADE7 Self Care Behaviors  Discussed living well with diabetes, further information about diabetes disease process, reviewed chronic condition and appropriate treatment and self management  Healthy Eating - Large focus on accuracy of carbohydrate counting and decreasing carb load at meals/ dispersing them throughout the day - pt admits he likes to eat large portions.  Reviewed one way of trying to reduce glycemic variability through following a carbohydrate controlled meal plan - pt not on board with this yet.  Pt given meal planning ideas to incorporate dietary recommendations,  Education review on decreasing cardiovascular risk with following Therapeutic Lifestyle Changes (TLC) nutrition plan for heart healthy eating and healthy eating through the stay at home recommendations  Being Active - Education on how exercise helps with : use temporary basal for exercise   - lowering BG by increasing the muscles ability to take up and use glucose   - weight loss   - healthier heart (improve lipid profile)   - improve sleep, mood, energy   - decrease stress      Monitoring - Continue with Dexcom G6 and use BG testing as needed  Patient continues to meet criteria for CGM coverage;   - patient has type one diabetes mellitus; and  - patient has been using CGM daily; and  - patient is insulin treated with a continues infusion of insulin via pump; and  - patient is requiring frequent insulin adjustments on the basis of CGM readings  - within six months prior to ordering the CGM, the patient has had an in-person visit with the practitioner; and  determined that criteria (1-4) above are met; and   - every six months following the initial prescription of the CGM, the treating practitioner has an in-person visit with the patient to assess adherence to their CGM regimen and diabetes treatment plan    Taking Medication - on NovoLog   Problem Solving -  Pump settings adjusted based on sensor download  Reviewed Pump settings (basal/bolus), menu options and use, bolus wizard suspend, temp basal BG testing recommendations, hypo/hyperglycemia guidelines, infusion set changes and fill and DKA prevention.  Wear sensor and change every 10 days   medical support team assistance, resources provided (written); good control of stress, pump adjustments made today slightly but elevated reading and low readings likely related to user recommended routine changes.  Bolus early 20+ min prior to meals, test BG prior to ALL meals, bolus for ALL snacks >5gm CHO, use temp basal for exercise  Healthy Coping - support of friends, family, and medical support team   Reducing Risks - Detailed education on potential complications associated with uncontrolled diabetes and prevention recommendations.  Recommendations include: annual eye exam, good oral cares with brushing BID and flossing daily, routine dental apts. good foot care tips and shoe wear - discussed monofilament test yearly.  Importance of adequate sleep and good control of BG to prevent developing complications related to uncontrolled DM including nephropathy, neuropathy, retinopathy, and cardiovascular disease.  Weight loss goal of 7 % of current body weight pounds as a reasonable goal to help increase insulin sensitivity      Pt able to verbalize understanding of above education, handouts provided for additional resources.       Goals:   1.  Practice healthy stress management and get good quality sleep with the goal of 7-8 hours per night.    2.   Physical activity: Recommend 30 min of physical activity on 5 or more days/  week.    3.  Eat in a healthy way, per diabetic plate method.  Nutrition reference: Eat 3 meals a day; snacks are optional.  A meal is three or more food groups; make it colorful for better nutrition.    4. Count carbohydrates and use bolus wizard for all meals and snacks bolus 15-20min prior to eating   5.  Use sensor and if not available BG testing 4+x/ day  Goal pre-meals 75 - 120; 2 hrs after meals 75 - 140   6.  Be aware of s/sx of hypo/ hyperglycemia and follow treatment protocol   7.  Always have back up pump supplies on hand and vial/ syringe with battery and glucose tablets   8. Follow up in 4 mo   9.  A1c <8%  Time in range 80 - 180 >70%      Professional Services   Time spent with pt 60 min   cc Dr. Kb Mcqueen

## 2022-02-15 NOTE — LETTER
(Inserted Image. Unable to display)   June 30, 2021  [place] [address]          Dear ROSALIO,      Thank you for selecting [facility] for your healthcare needs.    Your Healthcare Provider has recommended that you schedule a diabetes management appointment with our Certified Diabetes Educator, Rupal White RD, CD, CDE.     Please bring your glucose meter and your blood glucose diary to your appointment.    Available services include:  - Education about diabetes with guidance and support   - Education on the 7 Self Care Behaviors for Diabetes Management:     Healthy Eating   portion control, label readings, carbohydrate recommendations, heart healthy guidelines, meal planning    Being Active - Physical activity guidelines     Monitoring   blood glucose targets, evaluation of blood glucose readings and lab results    Taking Medication   medication management    Problem Solving   discuss any concerns/ questions     Healthy Coping   disease progression and management    Reducing Risks   prevention strategies to help avoid potential complications related to uncontrolled diabetes  - Weight loss strategies      (FYI   Regarding office visit: In some instances, a video visit may be offered as an option.)        To schedule an appointment or if you have further questions, please contact your clinic at [phone].      Powered by [employer]    Sincerely,    Rupal White R.D., C.D., C.D.E.

## 2022-02-15 NOTE — PROGRESS NOTES
Patient:   ROSALIO ANDRADE            MRN: [id number]            FIN: [id number]               Age:   74 years     Sex:  Male     :  1945   Associated Diagnoses:   Diabetes mellitus type 1; Hyperlipidemia NOS; Overweight (BMI 25.0-29.9)   Author:   Rupal White      Visit Information   Visit type:  Diabetes Self Management Education .    Referral source:  Richar RIZZO, Kb.       Chief Complaint   Uncontrolled DM Type 1 (ROLANDO), Hyperlipidemia, Overweight       History of Present Illness   Discussed nutrition, diabetes, and lifestyle management with pt.  Pt is here today to download Medtronic 630G insulin pump.  Pt started it on 17.    Pt's comfort level with the pump is very happy he made the transition to the pump.  Hgb a1c is at 8.3% = 192 eAG increasing trend, goal <8%  Nutrition:  Pt is counting carbohydrates and using the bolus wizard as directed.  BUT, pt does not bolus early just at meals/ occ snacks.    Pt's carb intake has improved but admits to snacking and not consistently bolusing - pt aware carb intake significantly affects glycemic fluctuations and variability     Physical Activity:  1 hour daily variety ( walk, weights etc), goes to [place] over the winter months and walks 3 miles daily, swimming at least 3 days/ week   Stress:   moderate  Sleep: good  Support: wife  Insulin:  Novolog via pump ~43.9u/ day   BG Testing: ~ 3.5x/ day not testing at evening meal   Routine diabetes care:  up to date with eye, dental, and foot exams - no concerns      BG avg 180 +/- 115 mg/dL (significant variability  Avg Daily Basal 15.58 (36%)  Avg Daily Bolus 27.25 (64%)  20% (9 readings below 70mg/dL)    ICR=   12:00     7  15:00     9    Sensitivity=  0:00  45  6:30  40 changed to 42  20:30  45      Target     110 - 110 changed to 120 - 120    Basal:  15.600  MN   0.525  3am   0.650  6:30am  0.750  13:00  06.25      Insulin action  3.5 hours  Max Bolus    17u  Max Basal   1u/ hr       Health Status   Allergies:     Nonallergic Reactions (Selected)  Severity Not Documented  Atorvastatin (Joint pain)   Medications:  (Selected)   Prescriptions  Prescribed  Contour next test strips: Contour next test strips, See Instructions, Instructions: testing 4x/ day - new start for insulin pump-E10.9, Supply, # 400 EA, 3 Refill(s), Type: Maintenance, Pharmacy: [facility], keep on file and pt will notify when needed, testing 4x/ day...  Contour test strips and lancets: Contour test strips and lancets, See Instructions, Instructions: testing 4 times daily, # 400 EA, 11 Refill(s), Type: Soft Stop, Pharmacy: [id number]  Glucagon Emergency Kit for Low Blood Sugar 1 mg injection: See Instructions, Instructions: 1mg injection if pt unable to safely consume glucose, # 1 EA, 0 Refill(s), Type: Maintenance, Pharmacy: [facility], keep on hold, 1mg injection if pt unable to safely consume glucose  Glucose tabs: Glucose tabs, See Instructions, Instructions: use as needed for hypoglycemia, # 100 EA, 3 Refill(s), Type: Maintenance, Pharmacy: [facility]  NovoLOG 100 units/mL injectable solution: See Instructions, Instructions: 70 units/day use with insulin pump     DX  E10.9, # 90 mL, 3 Refill(s), Type: Maintenance, Pharmacy: [facility], keep on file and pt will notify when needed, 70 units/day use with insulin pump     DX  E10.9  ULTRA-FINE III SHORT PEN NEEDL MG INJECTABLE: ULTRA-FINE III SHORT PEN NEEDL MG INJECTABLE, See Instructions, Instructions: injects 4 times daily, # 400 EA, 11 Refill(s), Pharmacy: [id number]  aspirin 81 mg oral tablet: 1 tab(s) ( 81 mg ), PO, Daily, # 30 tab(s), 0 Refill(s), Type: Maintenance, OTC (Rx)  levothyroxine 200 mcg (0.2 mg) oral tablet: = 1 tab(s) ( 200 mcg ), po, daily, # 90 tab(s), 3 Refill(s), Type: Maintenance, Pharmacy: [facility], keep on hold and pt will notify when needed, 1 tab(s) Oral daily  pravastatin 20 mg oral tablet: = 1 tab(s) ( 20  "mg ), PO, hs, # 90 tab(s), 3 Refill(s), Type: Maintenance, Pharmacy: [id number], keep on file and pt will notify when needed, 1 tab(s) Oral hs   Problem list:    All Problems  Intolerance of drug / SNOMED CT 11298381 / Confirmed  Essential tremor / SNOMED CT 6807005762 / Confirmed  Hyperlipidemia / SNOMED CT 71435390 / Confirmed  Hypothyroid / SNOMED CT 00004720 / Confirmed  Insulin pump in place / SNOMED CT 5859021992 / Confirmed  Diabetes mellitus type 1 / SNOMED CT 001627669 / Confirmed  Canceled: Long term (current) use of insulin / SNOMED CT 324593205      Histories   Past Medical History:    Active  Hyperlipidemia (32285137)  Hypothyroid (63621366)  Comments:  2010 CST 1:40 PM Kassi Gonzalez CMA  2007  Diabetes mellitus type 1 (038394622)  Comments:  2010 CST 1:41 PM Kassi Gonzalez CMA  2007  Essential tremor (7812509854)   Family History:    Diabetes mellitus type I  Father ()  Bowel obstruction  Father ()  [place] - Cancer of uterus  Mother ()     Procedure history:    Screening for malignant neoplasm of colon (SNOMED CT 3740483517) on 2015 at 70 Years.  Comments:  2016 8:15 AM Adali Morrow is negative  Sialogram (SNOMED CT 726417366) on 2006 at 60 Years.  Comments:  3/29/2013 10:58 AM CDT - Yoanna Porras  \"Stone.  Swollen gland.\"  removal of stone, left mandibular gland in the month of 2005 at 60 Years.  Sigmoidoscopy (SNOMED CT 54762965) performed by Sky Bui MD on 10/17/2002 at 57 Years.   Social History:        Alcohol Assessment            1-2 times per month, 1 drinks/episode average.      Tobacco Assessment            Former smoker, quit more than 30 days ago, Cigarettes      Employment and Education Assessment            Retired, Work/School description: /, [employer].  Previous               employment/school: [employer].      Home and " Environment Assessment            Marital status: .  Spouse/Partner name: Vicenta.      Exercise and Physical Activity Assessment            Exercise frequency: Daily.  Exercise type: Aerobics, Walking, Weight lifting.      Physical Examination   Measurements from flowsheet : Measurements   6/3/2019 10:10 AM CDT Height Measured - Standard 68.5 in    Weight Measured - Standard 179.8 lb    BSA 1.98 m2    Body Mass Index 26.94 kg/m2  HI         Health Maintenance      Recommendations     Pending (in the next year)        OverDue           Tetanus Vaccine due  07/26/12  and every 10  year(s)           DM - Communication with Managing Provider due  05/29/16  and every 1  year(s)           DM - Foot Exam due  05/29/16  and every 1  year(s)           Colorectal Cancer Screen (Colonoscopy) (Male) due  12/07/18  and every 3  year(s)           Colorectal Cancer Screen (Occult Blood) (Male) due  12/07/18  and every 3  year(s)        Due            Alcohol Misuse Screen (Male) due  05/15/19  and every 1  year(s)           Fall Risk Screen (Male) due  06/03/19  and every 1  year(s)           Hepatitis C Screen [id number] (Male) due  06/03/19  One-time only           Lung Cancer Screen (Male) due  06/03/19  and every 1  year(s)        Near Due            DM - HgbA1c near due  07/26/19  and every 3  month(s)        Refused            Zoster/Shingles Vaccine due  06/03/19  One-time only        Due In Future            Influenza Vaccine not due until  09/01/19  and every 1  year(s)           Aspirin Therapy for Prevention of CVD (Male) not due until  11/25/19  and every 5  year(s)           DM - Microalbumin not due until  04/26/20  and every 1  year(s)           DM - Eye Exam not due until  04/29/20  and every 1  year(s)           High Blood Pressure Screen (Male) not due until  05/03/20  and every 1  year(s)           Tobacco Use Screen (Male) not due until  05/03/20  and every 1  year(s)           Depression Screen (Male) not  due until  05/03/20  and every 1  year(s)     Satisfied (in the past 1 year)        Satisfied            Body Mass Index Check (Male) on  06/03/19.           DM - Eye Exam on  04/29/19.           DM - HgbA1c on  04/26/19.           DM - HgbA1c on  09/14/18.           DM - Microalbumin on  04/26/19.           DM - Microalbumin on  04/26/19.           Depression Screen (Male) on  05/03/19.           Depression Screen (Male) on  05/03/19.           Depression Screen (Male) on  05/03/19.           High Blood Pressure Screen (Male) on  05/03/19.           High Blood Pressure Screen (Male) on  09/20/18.           Influenza Vaccine on  09/20/18.           Lipid Disorders Screen (Male) on  09/14/18.           Lipid Disorders Screen (Male) on  09/14/18.           Lipid Disorders Screen (Male) on  09/14/18.           Lipid Disorders Screen (Male) on  09/14/18.           Pneumococcal Vaccine on  09/20/18.           Tobacco Use Screen (Male) on  05/03/19.           Tobacco Use Screen (Male) on  09/20/18.        Review / Management   Results review:  Lab results   4/26/2019 7:57 AM CDT Sodium Level 135 mmol/L    Potassium Level 4.4 mmol/L    Chloride Level 100 mmol/L    CO2 Level 26 mmol/L    Glucose Level 209 mg/dL  HI    BUN 18 mg/dL    Creatinine 0.90 mg/dL    BUN/Creat Ratio NOT APPLICABLE    eGFR 84 mL/min/1.73m2    eGFR African American 98 mL/min/1.73m2    Calcium Level 9.3 mg/dL    Hgb A1c 8.3  HI    U Microalbumin 0.3 mg/dL    Ur Creatinine 152 mg/dL    Ur Microalb/Creat Ratio 2   .       Impression and Plan   Diagnosis     Diabetes mellitus type 1 (LKL11-NT E10.9).     Hyperlipidemia NOS (HGN43-FD E11.69).     Overweight (BMI 25.0-29.9) (YDJ60-LS E66.3).       Counseled: Patient, Further instruction on AADE7 Self Care Behaviors  Discussed living well with diabetes, further information about diabetes disease process, reviewed chronic condition and appropriate treatment and self management  Healthy Eating - Discussed  following a carb controlled plan with more balanced meals instead of carb based.  ie this am fruit, banana, cereal, milk BG >300 in office due to not bolusing early and high carb meal.  Review carbohydrate counting, reading food labels, appropriate portion sizes, diabetic plate method as a general rule.  Patient is provided with additional magazines with recipes, meal planning ideas to incorporate dietary recommendations, meal planning and preparation  Education review on decreasing cardiovascular risk with following Therapeutic Lifestyle Changes (TLC) nutrition plan for heart healthy eating.    Being Active - Education on how exercise helps with : pt states low BG happen due to exercise - went through using temporary basal again with pt and strongly encouraged using it daily for exercise   - lowering BG by increasing the muscles ability to take up and use glucose   - weight loss   - healthier heart (improve lipid profile)   - improve sleep, mood, energy   - decrease stress      Monitoring - Reviewed recommended testing schedule and blood glucose target levels.  Test prior to every meal.  Pt does NOT test prior to evening meal and all high readings are at HS with HS avg of 330mg/dL!  Taking Medication - on NovoLog   Problem Solving -    Reviewed Pump settings (basal/bolus), menu options and use, bolus wizard suspend, temp basal BG testing recommendations, hypo/hyperglycemia guidelines, infusion set changes and fill and DKA prevention.  medical support team assistance, resources provided (written); good control of stress, pump adjustments made today slightly but elevated reading and low readings likely related to user recommended routine changes.  Bolus early 14 - 20+ min prior to meals, test BG prior to ALL meals, bolus for ALL snacks >5gm CHO, use temp basal for exercise  Healthy Coping - support of friends, family, and medical support team   Reducing Risks - Detailed education on potential complications associated  with uncontrolled diabetes and prevention recommendations.  Recommendations include: annual eye exam, good oral cares with brushing BID and flossing daily, routine dental apts. good foot care tips and shoe wear - discussed monofilament test yearly.  Importance of adequate sleep and good control of BG to prevent developing complications related to uncontrolled DM including nephropathy, neuropathy, retinopathy, and cardiovascular disease.  Weight loss goal of 7 - 10% of current body weight pounds as a reasonable goal to help increase insulin sensitivity      Pt able to verbalize understanding of above education, handouts provided for additional resources.       Goals:   1.  Practice healthy stress management and get good quality sleep with the goal of 7-8 hours per night.    2.   Physical activity: Recommend 30 min of physical activity on 5 or more days/ week.    3.  Eat in a healthy way, per diabetic plate method.  Nutrition reference: Eat 3 meals a day; snacks are optional.  A meal is three or more food groups; make it colorful for better nutrition.    4. Count carbohydrates and use bolus wizard for all meals and snacks bolus 15-20min prior to eating   5.  BG testing4+x/ day  Goal pre-meals 75 - 120; 2 hrs after meals 75 - 140   6.  Be aware of s/sx of hypo/ hyperglycemia and follow treatment protocol   7.  Always have back up pump supplies on hand and vial/ syringe with battery and glucose tablets   8.  follow up in 1 month       Professional Services   Time spent with pt 60 min   cc Dr. Kb Mcqueen

## 2022-02-15 NOTE — PROGRESS NOTES
Patient:   ROSALIO ANDRADE            MRN: [id number]            FIN: [id number]               Age:   75 years     Sex:  Male     :  1945   Associated Diagnoses:   None   Author:   Rupal White      Doctor: Kb Mcqueen   Patient Information  (Medicare and address information is on file)  Medicare HICN#: _  Address:_ City:_ State:_ Zip:_  Home Phone:_ Work Phone:_ Other Contact Phone:_    Diabetes self-management training (DSMT) and medical nutrition therapy (MNT) are individual and complementary services to improve diabetes care. For Medicare beneficiaries, both services can be ordered in the same year. Research indicates MNT combined with DSMT improves outcomes.    Diabetes Self-Managment Training (DSMT)  Medicare: 10 hours initial DSMT in 12 month period, plus 2 hours follow-up annually  *Check type of training services and number of hours requested:  _ Initial DSMT:  10 hours or _ no. hrs. requested  X Follow-up DSMT: X 2 hours or _ no. hrs. requested  PLUS X Additional insulin trainin no. hrs. requested for insulin pump education     *Patients with special needs requiring individual DSMT  Check all special needs that apply:  _ Vision _ Hearing  _ Physical  _Cognitive Impairment  _ Language limitations X Other  No classes offered    *DSMT Content  X All ten content areas, as appropriate  _ Monitoring diabetes   _ Diabetes as disease process  _ Psychological adjustment _ Physical activity  _ Nutritional management  _ Goal setting, problem solving  _ Medications     _ Prevent, detect and treat acute complications  _ Preconception/pregnancy _ Prevent, detect and teat chronic complications     management or gestational     diabetes management    Medical Nutrition Therapy (MNT)  Medicare: 3 hours initial MNT in the first calendar year, plus two hours follow-up MNT annually. Additional MNT hours available for change in medical condition, treament and/or diagnosis.  *Check the type of MNT and/or number of  additional hours requested:  _ Initial MNT: X Annual follow-up MNT  _ Additional MTNservices in the same calendar year, per RD recommendations  _ no. additional hrs. requested    Please specify change in medical condition, treatment and/or diagnosis      *Diagnosis  Please send recent labs for patient eligibility & outcomes monitoring  X Type 1 uncontrolled _ Type 1 controlled  _ Type 2 uncontrolled _ Type 2 controlled  _ Gestational diabetes _ Other _    Complications/Comorbidities  Check all that apply:  _ Hypertension   X Dyslipidemia  _ Stroke  _ Neuropathy   _ Nephropathy  _ PVD  _ Renal disease   _ Retinopathy  _ CHD  _ Non-healing wound _ Pregnancy   _ Obesity  _ Mental/affective disorder      _ Other _    Current Diabetes Medications  Specify type, dose and frequency  Oral: _  Insulin: Insulin Pump Medtronic   Patient now uses: _ Pen _ Needle X Pump    Patient Behavior Goals/Plan of Care    To improve blood sugar control to help prevent potential complications associated with uncontrolled diabetes, while minimizing the risk for hypoglycemia.  Education on insulin pump.      Signature and UPIN #: (UPIN and NPI are on file)  Group/Practice name, address and phone: [facility]  [id number] [phone]

## 2022-02-15 NOTE — TELEPHONE ENCOUNTER
---------------------  From: Rosy Miles CMA (Phone Messages Pool ([id number]_[facility]))   To: Rupal White;     Sent: 6/14/2021 8:14:25 AM CDT  Subject: FW: Diabetes, blood glucose           ---------------------  From: ROSALIO ANDRADE  To: [facility]  Sent: 06/13/2021 05:39 p.m. CDT  Subject: Diabetes, blood glucose  Dear Rupal White,  I am having severe lows at night. Would you please call me with possible adjustments with my pump settings?  Rosalio Mari.---------------------  From: Rupal White   To: Phone [employer] Pool ([id number] [facility]);     Sent: 6/14/2021 11:01:54 AM CDT  Subject: RE: Diabetes, blood glucose     Pt message with noc hypoglycemia.  Pt called - message left to return call  Current pump settings   Pt to return call for setting changes.      ICR  12:00      6  15:00     6.5     Sensitivity  0:00   37    Target   120 - 120     Basal:   21.175 decreasing to   MN   0.70  0.60  3am   0.85  0.75  6:30am  0.95  0.95  13:00 0.95  0.95  18:00 0.95  0.95  22:00  0.95  0.80    Insulin action  3.5 hours  Max Bolus    17u  Max Basal   1u/ hr     30 day Average sensor glucose 175 mg/dL (99.1% CGM active)     Time in target range 70 - 180  61% (goal 70%+)  High 181 - 250    37.1%  Very High >250    17.4%  Low 54 - 69     1.9%  Very low <54     0.6%---------------------  From: Jyoti James CMA (Phone Messages Pool ([id number]_[facility]))   To: ROSALIO ANDRADE    Sent: 6/15/2021 12:58:52 PM CDT  Subject: FW: Diabetes, blood glucose

## 2022-02-15 NOTE — TELEPHONE ENCOUNTER
---------------------  From: Rupal White   To: Phone Messages Pool ([id number]_[place] [facility] [place]);     Sent: 6/26/2019 2:33:15 PM CDT  Subject: RE: Medicare     Called and discussed with pt.  No changes at this time.        ---------------------  From: Chelo Liz CMA (Phone Messages Pool ([id number]_[facility]))   To: Rupal White;     Sent: 6/25/2019 1:44:14 PM CDT  Subject: FW: Medicare           ---------------------  From: ROSALIO ANDRADE  To: [facility]  Sent: 06/25/2019 01:35 p.m. CDT  Subject: Medicare Hi Debra,  Please call me regarding Medicare coverage of test strips: [phone] or cell: [phone].  Thanks,  Rosalio Andrade.

## 2022-02-15 NOTE — NURSING NOTE
Quick Intake Entered On:  6/3/2019 10:10 AM CDT    Performed On:  6/3/2019 10:10 AM CDT by Rupal White               Summary   Weight Measured :   179.8 lb(Converted to: 179 lb 13 oz, 81.56 kg)    Height Measured :   68.5 in(Converted to: 5 ft 8 in, 173.99 cm)    Body Mass Index :   26.94 kg/m2 (HI)    Body Surface Area :   1.98 m2   Rupal White - 6/3/2019 10:10 AM CDT

## 2022-02-15 NOTE — TELEPHONE ENCOUNTER
---------------------  From: Kb Mcqueen MD   To: ROSALIO ANDRADE    Sent: 11/17/2021 7:38:48 PM CST  Subject: General Message      Labs for your review.  We can discuss in more detail at future clinic visit.       Results:  Date Result Name Ind Value Ref Range   11/16/2021 8:03 AM Cholesterol ((H)) 220 mg/dL ( - <200)   11/16/2021 8:03 AM HDL  73 mg/dL (> OR = 40 - )   11/16/2021 8:03 AM Triglyceride  96 mg/dL ( - <150)   11/16/2021 8:03 AM LDL ((H)) 127    11/16/2021 8:03 AM Cholesterol/HDL Ratio  3.0 ( - <5.0)   11/16/2021 8:03 AM Non-HDL Cholesterol ((H)) 147 ( - <130)   11/16/2021 8:03 AM Hgb A1c ((H)) 7.6 ( - <5.7)   11/16/2021 8:03 AM TSH  1.68 mIU/L (0.40 - 4.50)

## 2022-02-15 NOTE — PROGRESS NOTES
Patient:   ROSALIO ANDRADE            MRN: [id number]            FIN: [id number]               Age:   74 years     Sex:  Male     :  1945   Associated Diagnoses:   Hypothyroid; Hyperlipidemia; Diabetes mellitus type 1; Tremor, Essential   Author:   Kb Mcqueen MD      Visit Information      Date of Service: 2019 09:46 am  Performing Location: [facility]  Encounter#: [id number]      Primary Care Provider (PCP):  Kb Mcqueen MD    NPI# [id number]      Referring Provider:  Kb Mcqueen MD    NPI# [id number]      Chief Complaint   5/3/2019 9:54 AM CDT     DM check            Additional Information:No additional information recorded during visit.   Chief complaint and symptoms as noted above and confirmed with patient      History of Present Illness   4/10/2014: Rosalio presents to clinic to establish care, previously followed by JAMES approximately the last 10 yrs. he s been diagnosed with adult onset type 1 diabetes, insulin managed with a combination of basal bolus insulin therapy.  Historically his glycemic control has not been optimal. Is very knowledgeable about carbohydrate counting and insulin to carbohydrate ratios.  He says that his main nemesis is evening snacking.  He will typically bolus with NovoLog when eating popcorn, otherwise does not bolus with snacking.  He has no known diabetic complications.  He remains very active with daily exercise, primarily swimming.  He swam back in college.  He resides in [place] for a portion of the year.  He s been resistant to starting on cholesterol medicines in the past.  Also with complaints of left sided elbow/forearm pains - not improved with forearm brace.  He is a retired orchestral director and plays base cello - arm pains limiting his abilities.    5/15/2018: Rosalio returns for regular diabetic follow-up.  Recently returned from [place].  Rather uneventful winter healthwise.  Doing very well with insulin pump therapy as a whole.  No cannula  issues.  Feels like overall blood sugars much better controlled.  Denies any hypoglycemic episodes.  Does describe some morning related myalgias.  Stopped his pravastatin in entirety approximately 1 week ago and states that myalgia substantially improved.    9/21/2018: Irwin returns to clinic for follow-up related to his type 1 diabetes.  Recently returned from [place].  He had an uneventful winter with no significant health issues.  Remains very active.  No limitations with physical activity.  Has not been taking aspirin due to perceived concerns related to recent news.  No other specific complaints or concerns.         Review of Systems   Constitutional:  No fever, No chills.    Eye:  Negative except as documented in history of present illness.    Ear/Nose/Mouth/Throat:  Negative except as documented in history of present illness.    Respiratory:  No shortness of breath.    Cardiovascular:  No chest pain, No palpitations, No peripheral edema, No syncope.    Gastrointestinal:  No nausea, No vomiting, No abdominal pain.    Genitourinary:  ED, No dysuria, No hematuria.    Hematology/Lymphatics:  Negative except as documented in history of present illness.    Endocrine:  No excessive thirst, No polyuria.    Immunologic:  No recurrent fevers.    Musculoskeletal:  No joint pain, No muscle pain.    Neurologic:  Alert and oriented X4, tremor, No numbness, No tingling, No headache.       Health Status   Allergies:    Nonallergic Reactions (Selected)  Severity Not Documented  Atorvastatin (Joint pain)   Medications:  (Selected)   Prescriptions  Prescribed  Contour next test strips: Contour next test strips, See Instructions, Instructions: testing 4x/ day - new start for insulin pump-E10.9, Supply, # 400 EA, 3 Refill(s), Type: Maintenance, Pharmacy: [place] [id number] [facility], keep on file and pt will notify when needed, testing 4x/ day...  Contour test strips and lancets: Contour test strips and lancets, See Instructions,  Instructions: testing 4 times daily, # 400 EA, 11 Refill(s), Type: Soft Stop, Pharmacy: [facility] [id number]  Glucagon Emergency Kit for Low Blood Sugar 1 mg injection: See Instructions, Instructions: 1mg injection if pt unable to safely consume glucose, # 1 EA, 0 Refill(s), Type: Maintenance, Pharmacy: [facility] [id number] [facility], keep on hold, 1mg injection if pt unable to safely consume glucose  Glucose tabs: Glucose tabs, See Instructions, Instructions: use as needed for hypoglycemia, # 100 EA, 3 Refill(s), Type: Maintenance, Pharmacy: [facility]  NovoLOG 100 units/mL injectable solution: See Instructions, Instructions: 70 units/day use with insulin pump     DX  E10.9, # 90 mL, 3 Refill(s), Type: Maintenance, Pharmacy: [facility] [id number] [facility], keep on file and pt will notify when needed, 70 units/day use with insulin pump     DX  E10.9  ULTRA-FINE III SHORT PEN NEEDL MG INJECTABLE: ULTRA-FINE III SHORT PEN NEEDL MG INJECTABLE, See Instructions, Instructions: injects 4 times daily, # 400 EA, 11 Refill(s), Pharmacy: [facility] [id number]  aspirin 81 mg oral tablet: 1 tab(s) ( 81 mg ), PO, Daily, # 30 tab(s), 0 Refill(s), Type: Maintenance, OTC (Rx)  levothyroxine 200 mcg (0.2 mg) oral tablet: = 1 tab(s) ( 200 mcg ), po, daily, # 90 tab(s), 3 Refill(s), Type: Maintenance, Pharmacy: [facility] [id number] [facility], keep on hold and pt will notify when needed, 1 tab(s) Oral daily  pravastatin 20 mg oral tablet: = 1 tab(s) ( 20 mg ), PO, hs, # 90 tab(s), 3 Refill(s), Type: Maintenance, Pharmacy: [facility] [id number] [facility], keep on file and pt will notify when needed, 1 tab(s) Oral hs,    Medications          *denotes recorded medication          ULTRA-FINE III SHORT PEN NEEDL MG INJECTABLE: See Instructions, injects 4 times daily, 400 EA.          Glucose tabs: See Instructions, use as needed for hypoglycemia, 100 EA.          Contour test strips and lancets: See Instructions, testing 4 times daily, 400 EA.          Contour next test  "strips: See Instructions, testing 4x/ day - new start for insulin pump-E10.9, 400 EA, 3 Refill(s).          aspirin 81 mg oral tablet: 81 mg, 1 tab(s), PO, Daily, 30 tab(s).          Glucagon Emergency Kit for Low Blood Sugar 1 mg injection: See Instructions, 1mg injection if pt unable to safely consume glucose, 1 EA, 0 Refill(s).          NovoLOG 100 units/mL injectable solution: See Instructions, 70 units/day use with insulin pump     DX  E10.9, 90 mL, 3 Refill(s).          levothyroxine 200 mcg (0.2 mg) oral tablet: 200 mcg, 1 tab(s), po, daily, 90 tab(s), 3 Refill(s).          pravastatin 20 mg oral tablet: 20 mg, 1 tab(s), PO, hs, 90 tab(s), 3 Refill(s).     Problem list:    All Problems  Intolerance of drug / SNOMED CT 13019962 / Confirmed  Essential tremor / SNOMED CT 6829971202 / Confirmed  Hyperlipidemia / SNOMED CT 72214447 / Confirmed  Hypothyroid / SNOMED CT 34631167 / Confirmed  Insulin pump in place / SNOMED CT 3179474995 / Confirmed  Diabetes mellitus type 1 / SNOMED CT 366272828 / Confirmed  Canceled: Long term (current) use of insulin / SNOMED CT 023935546      Histories   Past Medical History:    Active  Hyperlipidemia (51290072)  Hypothyroid (01736626)  Comments:  2010 CST 1:40 PM Kassi Gonzalez CMA  2007  Diabetes mellitus type 1 (145404979)  Comments:  2010 CST 1:41 PM Kassi Gonzalez CMA  2007  Essential tremor (3791269553)   Family History:    Diabetes mellitus type I  Father ()  Bowel obstruction  Father ()  [place] - Cancer of uterus  Mother ()     Procedure history:    Screening for malignant neoplasm of colon (5588881040) on 2015 at 70 Years.  Comments:  2016 8:15 AM Adali Morrow is negative  Sialogram (642944734) on 2006 at 60 Years.  Comments:  3/29/2013 10:58 AM CDT - [place] , Yoanna  \"Stone.  Swollen gland.\"  removal of stone, left mandibular gland in the month of 2005 at 60 Years.  Sigmoidoscopy (66897815) " on 10/17/2002 at 57 Years.   Social History:        Alcohol Assessment            1-2 times per month, 1 drinks/episode average.      Tobacco Assessment            Former smoker, quit more than 30 days ago, Cigarettes      Employment and Education Assessment            Retired, Work/School description: /, [employer].  Previous               employment/school: [employer].      Home and Environment Assessment            Marital status: .  Spouse/Partner name: Vicenta.      Exercise and Physical Activity Assessment            Exercise frequency: Daily.  Exercise type: Aerobics, Walking, Weight lifting.      Physical Examination   vital signs stable, as noted above   Vital Signs   5/3/2019 9:54 AM CDT Temperature Tympanic 97.5 DegF  LOW    Peripheral Pulse Rate 74 bpm    Systolic Blood Pressure 125 mmHg    Diastolic Blood Pressure 63 mmHg    Mean Arterial Pressure 84 mmHg      Measurements from flowsheet : Measurements   5/3/2019 9:54 AM CDT     Weight Measured - Standard                181.8 lb     General:  Alert and oriented, No acute distress.    Eye:  Extraocular movements are intact.    HENT:  Normocephalic, Tympanic membranes are clear, Oral mucosa is moist.    Neck:  Supple, Non-tender, No lymphadenopathy.    Respiratory:  Lungs are clear to auscultation, Respirations are non-labored.    Cardiovascular:  Normal rate, Regular rhythm, No murmur, No edema.    Gastrointestinal:  Soft, Non-tender, Non-distended, Wearing insulin pump.    Musculoskeletal:  Normal range of motion, Normal strength.    Neurologic:  Alert, Oriented, Normal motor function, No focal deficits, monofilament testing normal.    Cognition and Speech:  Oriented, Speech clear and coherent.    Psychiatric:  Appropriate mood & affect.       Review / Management   Results review:  Lab results   4/26/2019 7:57 AM CDT Sodium Level 135 mmol/L    Potassium Level 4.4 mmol/L    Chloride  Level 100 mmol/L    CO2 Level 26 mmol/L    Glucose Level 209 mg/dL  HI    BUN 18 mg/dL    Creatinine 0.90 mg/dL    BUN/Creat Ratio NOT APPLICABLE    eGFR 84 mL/min/1.73m2    eGFR African American 98 mL/min/1.73m2    Calcium Level 9.3 mg/dL    Hgb A1c 8.3  HI    U Microalbumin 0.3 mg/dL    Ur Creatinine 152 mg/dL    Ur Microalb/Creat Ratio 2   .       Impression and Plan   Diagnosis     Hypothyroid (TMP16-AH E03.9).     Hyperlipidemia (ZPA24-SR E78.5).     Diabetes mellitus type 1 (GTU11-RT E10.9).     Tremor, Essential (ABG18-AN G25.0).       .) type I DM, uncontrolled, late onset diabetes in adulthood (ROLANDO)  hypoglycemic medications:  insulin therapy:  Medtronic pump (initiated in '17): ICR: 1:7, sensitivity 40mg/dL; basal 0.5-0.625/hr (overall liking pump therapy)    - requesting pump download - anticipating need for further setting adjustment  last HbA1c: 8.3%   microvascular complications: none  macrovascular complications: none  ASA/SIMON/statin:  ASA 81mg daily, pravastatin 20mg qhs    .) ED   - cialis prn    health maintenance   -Cologuard negative (12/2015); repeat now   - PSA 0.3 (11/2015) - declines regular future screening   - intentional tremor (worse in AM) - suspect essential tremor; likely worsened by caffeine.  Low suspicions for parkinsons    RTC in 4 months         Professional Services   Counseling Summary:  This was a 25 minute visit with greater than 50% of that time spent counseling the patient.

## 2022-02-15 NOTE — TELEPHONE ENCOUNTER
Entered by Malika Joya CMA on January 02, 2020 1:18:52 PM CST  same symptoms as October  chest congestion, productive cough, no energy, nasal congestion-no fever, rec'd script in Oct from [facility] and really helped  symptoms started last Saturday and was [place] the plane [place]  BS's have been elevated       ---------------------  From: ROSALIO LUPE  To: [facility]  Sent: 01/02/2020 01:04 p.m. CST  Subject: FW: Prescription for Rosalio Lupe.  Merrill Daly,    Please call me on my cell regarding the previous message sent this morning.  [phone].  Thanks,  Rosalio.  ---------------------  From: ROSALIO ANDRADE  To: [facility]  Sent: 01/02/2020 09:49 a.m. CST  Subject: Prescription for Rosalio Andrade.  Merrill Kahn,  I have developed a bad cold and would like a prescription of a Z Pac.filled at the [facility] in [place], [place]. That worked the last time I was at the clinic. That [place], Pharmacy Number is [phone] and the Fax number is [phone]. My brother-in-law Carrillo Larsen will  the prescription in [place] when ready. Thanks so much.  Rosalio Andrade.Spoke with Rosalio at 0924am.  He is feeling better.  It isn't completely gone but getting much better. BG is ok, he has been checking more frequently.  He will call back or come in if anything is different.  Mary Leung CMA

## 2022-02-15 NOTE — LETTER
(Inserted Image. Unable to display)   September 20, 2019      [facility]  [address] [place], [place] [id number]                  Result Name Current Result Previous Result Reference Range   Lab Report   9/13/2019 4/26/2019    Cholesterol (mg/dL) ((H)) 205 9/13/2019   - <200   HDL (mg/dL)  71 9/13/2019  >40 -    Triglyceride (mg/dL)  68 9/13/2019   - <150   LDL ((H)) 118 9/13/2019     Cholesterol/HDL Ratio  2.9 9/13/2019   - <5.0   Non-HDL Cholesterol ((H)) 134 9/13/2019   - <130   TSH (mIU/L) ((L)) 0.10 9/13/2019  0.40 - 4.50   Hgb A1c ((H)) 8.5 9/13/2019 ((H)) 8.3 4/26/2019  - <5.7         Sincerely,        Kb Mcqueen MD

## 2022-02-15 NOTE — LETTER
(Inserted Image. Unable to display)     December 23, 2019      ROSALIO ANDRADE  [address] [place], [place] [id number]          Dear ROSALIO,      Thank you for selecting [facility] for your healthcare needs.      Our records indicate you are due for the following services:     Non-Fasting Labs.    If you had your labs done at another facility or with Direct Access Lab Testing at [facility], please bring in a copy of the results to your next visit, mail a copy, or drop off a copy of your results to your Healthcare Provider.      To schedule an appointment or if you have further questions, please contact your primary clinic:   [facility]       [phone]   [facility]       [phone]              [facility]     [phone]      Powered by [employer]    Sincerely,    Kb Mcqueen MD

## 2022-02-15 NOTE — LETTER
(Inserted Image. Unable to display)   October 09, 2019        ROSALIO MODI [place], [place] [id number]        Dear ROSALIO,      Thank you for selecting [facility] ([facility]) for your healthcare needs.    Your Healthcare Provider has recommended that you schedule a diabetes management appointment with our Certified Diabetes Educator, Rupal White, RD, CD, CDE.     Please bring your glucose meter and your blood glucose diary to your appointment.    Available services include:  - Education about diabetes with guidance and support   - Education on the 7 Self Care Behaviors for Diabetes Management:     Healthy Eating   portion control, label readings, carbohydrate recommendations, heart healthy guidelines, meal planning    Being Active - Physical activity guidelines     Monitoring   blood glucose targets, evaluation of blood glucose readings and lab results    Taking Medication   medication management    Problem Solving   discuss any concerns/ questions     Healthy Coping   disease progression and management    Reducing Risks   prevention strategies to help avoid potential complications related to uncontrolled diabetes  - Weight loss strategies    To schedule an appointment or if you have further questions, please contact your primary clinic:   [facility]       [phone]   [facility]       [phone]              [facility]     [phone]      Powered by [employer] and [employer]    Sincerely,    Rupal White R.D., C.D., C.D.E.

## 2022-02-15 NOTE — NURSING NOTE
Quick Intake Entered On:  9/9/2020 2:20 PM CDT    Performed On:  9/9/2020 2:20 PM CDT by Rupal White               Summary   Weight Measured :   186.2 lb(Converted to: 186 lb 3 oz, 84.46 kg)    Height Measured :   68.5 in(Converted to: 5 ft 8 in, 173.99 cm)    Body Mass Index :   27.9 kg/m2 (HI)    Body Surface Area :   2.02 m2   Height/Length Estimated :   73 cm(Converted to: 2 ft 5 in, 28.74 in)    Rupal White - 9/9/2020 2:20 PM CDT

## 2022-02-15 NOTE — TELEPHONE ENCOUNTER
---------------------  From: Malika Joya CMA ([employer] Message Pool ([id number]_[facility]))   To: ROSALIO ANDRADE    Sent: 9/17/2019 8:03:04 AM CDT  Subject: RE: Contour Test Strips     Merrill Gayle,    How often are you testing your blood sugars?  Your current prescription that was sent in on 9/14 to [facility] reads 4 times daily.    Malika      ---------------------  From: Norma Enciso CMA (Phone Messages Pool ([id number]_[facility]))   To: [employer] Message Pool ([id number] [facility] [place]);     Sent: 9/16/2019 3:51:01 PM CDT  Subject: FW: Contour Test Strips           ---------------------  From: ROSALIO ANDRADE  To: [facility]  Sent: 09/16/2019 03:44 p.m. CDT  Subject: Contour Test Strips  Dr. Kb Mcqueen,  When I tried to fill my prescription of Contour Test Strips, through [facility], the pharmacist informed me that Medicare is only allowing 3 test strips per day for diabetics. That has been difficult for me so I m wondering if you would change my prescription? I have an appointment with you this Friday.  Rosalio Andrade.

## 2022-02-15 NOTE — NURSING NOTE
Comprehensive Intake Entered On:  4/23/2021 11:45 AM CDT    Performed On:  4/23/2021 11:35 AM CDT by Charles Harris CMA               Summary   Chief Complaint :   Pt here for a Preop Px for Left Shoulder Rotator Cuff repair by Dr Paul at [facility] DOS 5/3/21.   Weight Measured :   189 lb(Converted to: 189 lb 0 oz, 85.729 kg)    Height Measured :   67 in(Converted to: 5 ft 7 in, 170.18 cm)    Body Mass Index :   29.6 kg/m2 (HI)    Body Surface Area :   2.01 m2   Height/Length Estimated :   73 cm(Converted to: 2 ft 5 in, 28.74 in)    Systolic Blood Pressure :   147 mmHg (HI)    Diastolic Blood Pressure :   81 mmHg (HI)    Mean Arterial Pressure :   103 mmHg   Peripheral Pulse Rate :   72 bpm   Vital Signs Comments :   Pulse Irregular   BP Site :   Right arm   Pulse Site :   Radial artery   BP Method :   Electronic   Temperature Tympanic :   97.7 DegF(Converted to: 36.5 DegC)  (LOW)    Respiratory Rate :   16 br/min   Charles Harris CMA - 4/23/2021 11:35 AM CDT   Health Status   Allergies Verified? :   Yes   Medication History Verified? :   Yes   Medical History Verified? :   Yes   Pre-Visit Planning Status :   Completed   Tobacco Use? :   Former smoker   Charles Harris CMA - 4/23/2021 11:35 AM CDT   Meds / Allergies   (As Of: 4/23/2021 11:45:42 AM CDT)   Allergies (Active)   No known allergies  Estimated Onset Date:   Unspecified ; Created By:   Trini Michaels CMA; Reaction Status:   Active ; Category:   Drug ; Substance:   No known allergies ; Type:   Allergy ; Updated By:   Trnii Michaels CMA; Reviewed Date:   3/31/2021 9:13 PM CDT        Medication List   (As Of: 4/23/2021 11:45:42 AM CDT)   Prescription/Discharge Order    insulin aspart  :   insulin aspart ; Status:   Prescribed ; Ordered As Mnemonic:   NovoLOG 100 units/mL injectable solution ; Simple Display Line:   See Instructions, INJECT UP TO 70 UNITS DAILY VIA PUMP-  Dx E10.9, 90 mL, 3 Refill(s) ; Ordering Provider:   Kb Mcqueen MD; Catalog Code:   insulin  aspart ; Order Dt/Tm:   12/1/2020 9:43:57 AM CST          pravastatin  :   pravastatin ; Status:   Prescribed ; Ordered As Mnemonic:   pravastatin 20 mg oral tablet ; Simple Display Line:   See Instructions, TAKE 1 TABLET BY MOUTH ONCE DAILY AT BEDTIME, 90 unknown unit, 3 Refill(s) ; Ordering Provider:   Kb Mcqueen MD; Catalog Code:   pravastatin ; Order Dt/Tm:   12/1/2020 9:43:58 AM CST          Miscellaneous Rx Supply  :   Miscellaneous Rx Supply ; Status:   Prescribed ; Ordered As Mnemonic:   Dexcom G6 sensors ; Simple Display Line:   See Instructions, change sensor every 10 days  E10.9, 9 EA, 11 Refill(s) ; Ordering Provider:   Kb Mcqueen MD; Catalog Code:   Miscellaneous Rx Supply ; Order Dt/Tm:   12/1/2020 9:43:58 AM CST          levothyroxine  :   levothyroxine ; Status:   Prescribed ; Ordered As Mnemonic:   levothyroxine 175 mcg (0.175 mg) oral tablet ; Simple Display Line:   175 mcg, 1 tab(s), Oral, daily, 90 tab(s), 3 Refill(s) ; Ordering Provider:   Kb Mcqueen MD; Catalog Code:   levothyroxine ; Order Dt/Tm:   12/1/2020 8:50:19 AM CST          Miscellaneous Rx Supply  :   Miscellaneous Rx Supply ; Status:   Prescribed ; Ordered As Mnemonic:   Dexcom G6  ; Simple Display Line:   See Instructions, use to see glucose readings, 1 EA, 1 Refill(s) ; Ordering Provider:   Kb Mcqueen MD; Catalog Code:   Miscellaneous Rx Supply ; Order Dt/Tm:   9/2/2020 4:21:09 PM CDT          Miscellaneous Rx Supply  :   Miscellaneous Rx Supply ; Status:   Prescribed ; Ordered As Mnemonic:   Dexcom G6 transmitter ; Simple Display Line:   See Instructions, change every 3 months, 1 EA, 3 Refill(s) ; Ordering Provider:   Kb Mcqueen MD; Catalog Code:   Miscellaneous Rx Supply ; Order Dt/Tm:   9/2/2020 4:20:17 PM CDT          Miscellaneous Prescription  :   Miscellaneous Prescription ; Status:   Prescribed ; Ordered As Mnemonic:   CONTOUR NEXT TEST STRIPS ; Simple Display Line:   See Instructions, pt testing 4 times daily-  pt has a pump and needing to test more often due to fluctuating BS's - Dx - Z96.41 and E10.9, 400 EA, 3 Refill(s) ; Ordering Provider:   Kb Mcqueen MD; Catalog Code:   Miscellaneous Prescription ; Order Dt/Tm:   9/26/2019 11:48:12 AM CDT          Miscellaneous Prescription  :   Miscellaneous Prescription ; Status:   Prescribed ; Ordered As Mnemonic:   CONTOUR NEXT TEST STRIP ; Simple Display Line:   See Instructions, TEST 4 TIMES A DAY, 400 unknown unit, 3 Refill(s) ; Ordering Provider:   Kb Mcqueen MD; Catalog Code:   Miscellaneous Prescription ; Order Dt/Tm:   9/17/2019 11:41:58 AM CDT          glucagon  :   glucagon ; Status:   Prescribed ; Ordered As Mnemonic:   Glucagon Emergency Kit for Low Blood Sugar 1 mg injection ; Simple Display Line:   See Instructions, 1mg injection if pt unable to safely consume glucose, 1 EA, 0 Refill(s) ; Ordering Provider:   Kb Mcqueen MD; Catalog Code:   glucagon ; Order Dt/Tm:   5/3/2019 10:50:31 AM CDT          Miscellaneous Prescription  :   Miscellaneous Prescription ; Status:   Prescribed ; Ordered As Mnemonic:   ULTRA-FINE III SHORT PEN NEEDL MG INJECTABLE ; Simple Display Line:   See Instructions, injects 4 times daily, 400 EA ; Ordering Provider:   Zachery Koehler MD; Catalog Code:   Miscellaneous Prescription ; Order Dt/Tm:   6/5/2012 4:48:13 PM CDT          aspirin  :   aspirin ; Status:   Prescribed ; Ordered As Mnemonic:   aspirin 81 mg oral tablet ; Simple Display Line:   81 mg, 1 tab(s), PO, Daily, 30 tab(s) ; Ordering Provider:   Zachery Koehler MD; Catalog Code:   aspirin ; Order Dt/Tm:   3/2/2012 11:59:54 AM CST          Miscellaneous Rx Supply  :   Miscellaneous Rx Supply ; Status:   Prescribed ; Ordered As Mnemonic:   Glucose tabs ; Simple Display Line:   See Instructions, use as needed for hypoglycemia, 100 EA ; Ordering Provider:   Zachery Koehler MD; Catalog Code:   Miscellaneous Rx Supply ; Order Dt/Tm:   3/29/2011 9:39:13 AM CDT            Home Meds     psyllium  :   psyllium ; Status:   Documented ; Ordered As Mnemonic:   Metamucil ; Simple Display Line:   Oral, 0 Refill(s) ; Catalog Code:   psyllium ; Order Dt/Tm:   5/19/2020 8:11:08 AM CDT            ID Risk Screen   Recent Travel History :   No recent travel   Family Member Travel History :   No recent travel   Other Exposure to Infectious Disease :   Unknown   COVID-19 Testing Status :   No COVID-19 test performed   Charles Harris CMA - 4/23/2021 11:35 AM CDT   Social History   Social History   (As Of: 4/23/2021 11:45:42 AM CDT)   Alcohol:        1-2 times per month, 1 drinks/episode average.   (Last Updated: 11/25/2014 9:46:31 AM CST by Malika Joya CMA)          Tobacco:        Former smoker, quit more than 30 days ago, Cigarettes   (Last Updated: 12/1/2020 8:35:13 AM CST by Malika Joya CMA)          Electronic Cigarette/Vaping:        Electronic Cigarette Use: Never.   (Last Updated: 12/1/2020 8:35:17 AM CST by Malika Joya CMA)          Employment/School:        Retired, Work/School description: /, [employer].  Previous employment/school: [employer].   (Last Updated: 3/29/2013 11:08:49 AM CDT by Yoanna Porras)          Home/Environment:        Marital status: .  Spouse/Partner name: Vicenta.   (Last Updated: 3/29/2013 11:02:41 AM CDT by Yoanna Porras)          Exercise:        Exercise frequency: Daily.  Exercise type: Aerobics, Walking, Weight lifting.   (Last Updated: 12/20/2016 1:13:09 PM CST by Sonia Cleary)

## 2022-02-15 NOTE — PROGRESS NOTES
Patient:   ROSALIO ANDRADE            MRN: [id number]            FIN: [id number]               Age:   75 years     Sex:  Male     :  1945   Associated Diagnoses:   Hypothyroid; Hyperlipidemia; Diabetes mellitus type 1; Tremor, Essential   Author:   Kb Mcqueen MD      Visit Information      Date of Service: 2020 08:27 am  Performing Location: [facility]  Encounter#: [id number]      Primary Care Provider (PCP):  Kb Mcqueen MD    NPI# [id number]      Referring Provider:  Kb Mcqueen MD    NPI# [id number]      Chief Complaint   2020 8:34 AM CST    f/u chronic              Additional Information:No additional information recorded during visit.   Chief complaint and symptoms as noted above and confirmed with patient      History of Present Illness   4/10/2014: Rosalio presents to clinic to establish care, previously followed by JAMES approximately the last 10 yrs. he s been diagnosed with adult onset type 1 diabetes, insulin managed with a combination of basal bolus insulin therapy.  Historically his glycemic control has not been optimal. Is very knowledgeable about carbohydrate counting and insulin to carbohydrate ratios.  He says that his main nemesis is evening snacking.  He will typically bolus with NovoLog when eating popcorn, otherwise does not bolus with snacking.  He has no known diabetic complications.  He remains very active with daily exercise, primarily swimming.  He swam back in college.  He resides in [place] for a portion of the year.  He s been resistant to starting on cholesterol medicines in the past.  Also with complaints of left sided elbow/forearm pains - not improved with forearm brace.  He is a retired orchestral director and plays base cello - arm pains limiting his abilities.    2020: Rosalio returns to clinic for follow-up.  He was started on a continuous glucose monitor in September of this year.  Overall has found it helpful especially for eliminating his evening  lows.  Still challenging to maintain daytime euglycemia though denies having any further hypoglycemia.  Planning on leaving for [place] in early January.  Has missed being more physically active all appear.  Feeling well otherwise.  No identified Covid exposures.         Review of Systems   Constitutional:  No fever, No chills.    Eye:  Negative except as documented in history of present illness.    Ear/Nose/Mouth/Throat:  Negative except as documented in history of present illness.    Respiratory:  No shortness of breath.    Cardiovascular:  No chest pain, No palpitations, No peripheral edema, No syncope.    Gastrointestinal:  No nausea, No vomiting, No abdominal pain.    Genitourinary:  ED, No dysuria, No hematuria.    Hematology/Lymphatics:  Negative except as documented in history of present illness.    Endocrine:  No excessive thirst, No polyuria.    Immunologic:  No recurrent fevers.    Musculoskeletal:  No joint pain, No muscle pain.    Neurologic:  Alert and oriented X4, tremor, No numbness, No tingling, No headache.       Health Status   Allergies:    Nonallergic Reactions (Selected)  Severity Not Documented  Atorvastatin (Joint pain)   Medications:  (Selected)   Prescriptions  Prescribed  CONTOUR NEXT TEST STRIP: See Instructions, Instructions: TEST 4 TIMES A DAY, # 400 unknown unit, 3 Refill(s), Type: Maintenance, Pharmacy: [id number] [place], TEST 4 TIMES A DAY  CONTOUR NEXT TEST STRIPS: CONTOUR NEXT TEST STRIPS, See Instructions, Instructions: pt testing 4 times daily- pt has a pump and needing to test more often due to fluctuating BS's - Dx - Z96.41 and E10.9, Supply, # 400 EA, 3 Refill(s), Type: Maintenance, Pharmacy: [facility],...  Dexcom G6 : Dexcom G6 , See Instructions, Instructions: use to see glucose readings, Supply, # 1 EA, 1 Refill(s), Type: Maintenance, Pharmacy: [facility], use to see glucose readings, 68.5, in, 07/09/20 10:12:00 CDT, Height Measured, 186.6, lb,  07/09/20...  Dexcom G6 sensors: Dexcom G6 sensors, See Instructions, Instructions: change sensor every 10 days, Supply, # 9 EA, 3 Refill(s), Type: Maintenance, Pharmacy: [facility], change sensor every 10 days, 68.5, in, 07/09/20 10:12:00 CDT, Height Measured, 186.6, lb, 07/09/20...  Dexcom G6 transmitter: Dexcom G6 transmitter, See Instructions, Instructions: change every 3 months, Supply, # 1 EA, 3 Refill(s), Type: Maintenance, Pharmacy: [facility], change every 3 months, 68.5, in, 07/09/20 10:12:00 CDT, Height Measured, 186.6, lb, 07/09/20 10:12:00...  Glucagon Emergency Kit for Low Blood Sugar 1 mg injection: See Instructions, Instructions: 1mg injection if pt unable to safely consume glucose, # 1 EA, 0 Refill(s), Type: Maintenance, Pharmacy: [facility] [id number] [facility] [place], keep on hold, 1mg injection if pt unable to safely consume glucose  Glucose tabs: Glucose tabs, See Instructions, Instructions: use as needed for hypoglycemia, # 100 EA, 3 Refill(s), Type: Maintenance, Pharmacy: [facility]  NovoLOG 100 units/mL injectable solution: See Instructions, Instructions: INJECT UP TO 70 UNITS DAILY VIA PUMP-  Dx E10.9, # 90 mL, 3 Refill(s), Type: Maintenance, Pharmacy: [facility], INJECT UP TO 70 UNITS DAILY VIA PUMP-  Dx E10.9  ULTRA-FINE III SHORT PEN NEEDL MG INJECTABLE: ULTRA-FINE III SHORT PEN NEEDL MG INJECTABLE, See Instructions, Instructions: injects 4 times daily, # 400 EA, 11 Refill(s), Pharmacy: [id number]  aspirin 81 mg oral tablet: 1 tab(s) ( 81 mg ), PO, Daily, # 30 tab(s), 0 Refill(s), Type: Maintenance, OTC (Rx)  levothyroxine 175 mcg (0.175 mg) oral tablet: = 1 tab(s) ( 175 mcg ), Oral, daily, # 90 tab(s), 3 Refill(s), Type: Maintenance, Pharmacy: [facility], 1 tab(s) Oral daily, 67, in, 12/01/20 8:34:00 CST, Height Measured, 191.4, lb, 12/01/20 8:34:00 CST, Weight Measured  pravastatin 20 mg oral tablet: See Instructions, Instructions: TAKE 1 TABLET BY MOUTH ONCE DAILY AT BEDTIME, #  90 unknown unit, 0 Refill(s), Pharmacy: [facility], TAKE 1 TABLET BY MOUTH ONCE DAILY AT BEDTIME, 68.5, in, 09/09/20 14:20:00 CDT, Height Measured, 186.2, lb, 09/09/...  Documented Medications  Documented  Metamucil: Oral, 0 Refill(s), Type: Maintenance,    Medications          *denotes recorded medication          ULTRA-FINE III SHORT PEN NEEDL MG INJECTABLE: See Instructions, injects 4 times daily, 400 EA.          CONTOUR NEXT TEST STRIP: See Instructions, TEST 4 TIMES A DAY, 400 unknown unit, 3 Refill(s).          CONTOUR NEXT TEST STRIPS: See Instructions, pt testing 4 times daily- pt has a pump and needing to test more often due to fluctuating BS's - Dx - Z96.41 and E10.9, 400 EA, 3 Refill(s).          Glucose tabs: See Instructions, use as needed for hypoglycemia, 100 EA.          Dexcom G6 sensors: See Instructions, change sensor every 10 days, 9 EA, 3 Refill(s).          Dexcom G6 transmitter: See Instructions, change every 3 months, 1 EA, 3 Refill(s).          Dexcom G6 : See Instructions, use to see glucose readings, 1 EA, 1 Refill(s).          aspirin 81 mg oral tablet: 81 mg, 1 tab(s), PO, Daily, 30 tab(s).          Glucagon Emergency Kit for Low Blood Sugar 1 mg injection: See Instructions, 1mg injection if pt unable to safely consume glucose, 1 EA, 0 Refill(s).          NovoLOG 100 units/mL injectable solution: See Instructions, INJECT UP TO 70 UNITS DAILY VIA PUMP-  Dx E10.9, 90 mL, 3 Refill(s).          levothyroxine 175 mcg (0.175 mg) oral tablet: 175 mcg, 1 tab(s), Oral, daily, 90 tab(s), 3 Refill(s).          pravastatin 20 mg oral tablet: See Instructions, TAKE 1 TABLET BY MOUTH ONCE DAILY AT BEDTIME, 90 unknown unit, 0 Refill(s).          *Metamucil: Oral, 0 Refill(s).       Problem list:    All Problems  Intolerance of drug / SNOMED CT 43592680 / Confirmed  Essential tremor / SNOMED CT 3479545204 / Confirmed  Hyperlipidemia / SNOMED CT 63746420 / Confirmed  Hypothyroid / SNOMED  "CT 03442481 / Confirmed  Insulin pump in place / SNOMED CT 3893664819 / Confirmed  Diabetes mellitus type 1 / SNOMED CT 708412479 / Confirmed  Canceled: Long term (current) use of insulin / SNOMED CT 164193506      Histories   Past Medical History:    Active  Hyperlipidemia (81442043)  Hypothyroid (34753448)  Comments:  2010 CST 1:40 PM [facility] - Kassi Link CMA    Diabetes mellitus type 1 (649955897)  Comments:  2010 CST 1:41 PM [facility] - Kassi Link CMA  2007  Essential tremor (1071705275)   Family History:    Diabetes mellitus type I  Father ()  Bowel obstruction  Father ()  CA - Cancer of uterus  Mother ()     Procedure history:    Colonoscopy ([id number]) on 2019 at 74 Years.  Comments:  2020 8:52 AM CDT - Marianna Villanueva  Indication: Positive Cologuard.             Sedation: MAC.    2019 9:23 AM CDT - Malika Joya CMA  Tubular adenoma - negative for high grade dysplasia - Repeat in 3years  Screening for colon cancer (0318326223) on 2019 at 74 Years.  Comments:  6/3/2019 4:46 PM CDT - Yoanna Danielson MA  Colnagi result:  positive  Recommendation:  needs colonoscopy  Screening for malignant neoplasm of colon (4371298576) on 2015 at 70 Years.  Comments:  2016 8:15 AM CST - Adali Pantoja is negative  Sialogram (327656671) on 2006 at 60 Years.  Comments:  3/29/2013 10:58 AM CDT - Yoanna Porras  \"Stone.  Swollen gland.\"  removal of stone, left mandibular gland in the month of 2005 at 60 Years.  Sigmoidoscopy ([id number]) on 10/17/2002 at 57 Years.  Insulin pump, device (0323320747).   Social History:        Electronic Cigarette/Vaping Assessment            Electronic Cigarette Use: Never.      Alcohol Assessment            1-2 times per month, 1 drinks/episode average.      Tobacco Assessment            Former smoker, quit more than 30 days ago, Cigarettes      Employment and Education Assessment            Retired, Work/School " description: /, [employer].  Previous               employment/school: [employer]      Home and Environment Assessment            Marital status: .  Spouse/Partner name: Vicenta.      Exercise and Physical Activity Assessment            Exercise frequency: Daily.  Exercise type: Aerobics, Walking, Weight lifting.        Physical Examination   vital signs stable, as noted above   Vital Signs   12/1/2020 8:34 AM CST Temperature Tympanic 96.8 DegF  LOW    Peripheral Pulse Rate 77 bpm    Systolic Blood Pressure 136 mmHg  HI    Diastolic Blood Pressure 78 mmHg    Mean Arterial Pressure 97 mmHg    Oxygen Saturation 98 %      Measurements from flowsheet : Measurements   12/1/2020 8:34 AM CST Height Measured - Standard 67 in    Height/Length Estimated 73 cm    Weight Measured - Standard 191.4 lb    BSA 2.02 m2    Body Mass Index 29.97 kg/m2  HI      General:  Alert and oriented, No acute distress.    Eye:  Extraocular movements are intact.    HENT:  Normocephalic, Tympanic membranes are clear, Oral mucosa is moist.    Neck:  Supple, Non-tender, No lymphadenopathy.    Respiratory:  Lungs are clear to auscultation, Respirations are non-labored.    Cardiovascular:  Normal rate, Regular rhythm, No murmur, No edema.    Gastrointestinal:  Soft, Non-tender, Non-distended, Wearing insulin pump.    Musculoskeletal:  Normal range of motion, Normal strength.    Feet:  By monofilament exam, onychomycosis bilaterally.    Neurologic:  Alert, Oriented, Normal motor function, No focal deficits.    Cognition and Speech:  Oriented, Speech clear and coherent.    Psychiatric:  Appropriate mood & affect.       Review / Management   Results review:  Lab results   11/24/2020 8:33 AM CST Hgb A1c 8.2  HI    Cholesterol 211 mg/dL  HI    Non-    HDL 86 mg/dL    Chol/HDL Ratio 2.5      HI    Triglyceride 58 mg/dL    TSH 0.25 mIU/L  LOW   .       Impression and Plan    Diagnosis     Hypothyroid (HPE15-CH E03.9).     Hyperlipidemia (FPG33-QV E78.5).     Diabetes mellitus type 1 (ISH46-JF E10.9).     Tremor, Essential (PNE47-WF G25.0).       .) type I DM, uncontrolled, late onset diabetes in adulthood (ROLANDO)  hypoglycemic medications:  insulin therapy:  Medtronic pump (initiated in '17), recently started on Dexcom6 CGM (9/2020)  ICR=   12:00     7.5    Sensitivity=  0:00  40       [facility]     120 - 120     Basal:  16.05  MN   0.575  3am   0.655    6:30am  0.850    13:00 0.850    22:00  0.550      Insulin action  3.5 hours  Max Bolus    17u  Max Basal   1u/ hr    last HbA1c: 8.2%   microvascular complications: none  macrovascular complications: none  ASA/SIMON/statin:  ASA 81mg daily, pravastatin 20mg qhs    .) hypothyroidism   -  reducing levothyroxine to 175mcg daily    .) ED   - cialis prn    health maintenance   -Cologuard positive(5/2019); colonoscopy (6/2019); multiple adenomatous polyps - repeat study in 3 yrs   - PSA 0.3 (11/2015) - declines regular future screening   - intentional tremor (worse in AM) - suspect essential tremor; likely worsened by caffeine.  Low suspicions for parkinsons    .) Covid19 pandemic  - discussed importance of social distancing, hand hygiene, and unique aspects related to individualized health  - discussed s/s and appropriate measures in context of worsening or developing respiratory symptoms     RTC in 5 months (return from [place])      Professional Services   Counseling Summary:  This was a 25 minute visit with greater than 50% of that time spent counseling the patient.

## 2022-02-15 NOTE — TELEPHONE ENCOUNTER
---------------------  From: Bianca Gordon CMA [id number]   To: Rupal White;     Sent: 8/7/2020 9:53:04 AM CDT  Subject: CONSUEMR MESSAGE : Insulin Pump adjustment.           ---------------------  From: ROSALIO ANDRADE  To: [facility]  Sent: 08/07/2020 09:15 a.m. CDT  Subject: Insulin Pump adjustment.  Dear Keshia White,  I have had an unusual number of low blood sugars during the last seven nights. Would you contact me and check the settings on my pump to make possible adjustments?  Thanks,  Rosalio Andrade.---------------------  From: Rupal White   To: Phone Messages [facility] ([id number]_[facility]);     Sent: 8/10/2020 12:43:35 PM CDT  Subject: RE: CONSUEMR MESSAGE : Insulin Pump adjustment.     Called pt to discuss pump changes.      Basal:   19.275  changed to 19.025  MN   0.675  0.625  3am   0.625  0.650  6:30am  0.850    13:00 0.850      Pt comfortable with changes and will follow up as needednoted

## 2022-02-15 NOTE — TELEPHONE ENCOUNTER
---------------------  From: Bob/Fatuma ROCHA (Phone Messages Pool ([id number]_[facility]))   To: ROSALIO ANDRADE    Sent: 9/23/2019 8:35:19 AM CDT  Subject: FW: Contour 100 Test Strips     Malika Glass and Dr. Mqcueen are not in clinic today, but they will be here tomorrow 9/24/19 from 8-5. Are you having problems with your insurance not covering them or do you just need a refill?    Thanks,    Fatuma TINAJERO MA.      ---------------------  From: ROSALIO ANDRADE  To: [facility]  Sent: 09/20/2019 07:07 p.m. CDT  Subject: Contour 100 Test Strips  Malika Joya,  I am still having an issue with test strips.  Been working on it for over a week and am running short. [facility] in [place] wants Dr. Mcqueen to call Medicare for new prescription, 4 test strips/day. HELP.  ThanksAashish

## 2022-02-15 NOTE — NURSING NOTE
Comprehensive Intake Entered On:  12/1/2020 8:37 AM CST    Performed On:  12/1/2020 8:34 AM CST by Malika Joya CMA               Summary   Chief Complaint :   f/u chronic   Weight Measured :   191.4 lb(Converted to: 191 lb 6 oz, 86.818 kg)    Height Measured :   67 in(Converted to: 5 ft 7 in, 170.18 cm)    Body Mass Index :   29.97 kg/m2 (HI)    Body Surface Area :   2.02 m2   Height/Length Estimated :   73 cm(Converted to: 2 ft 5 in, 28.74 in)    Systolic Blood Pressure :   136 mmHg (HI)    Diastolic Blood Pressure :   78 mmHg   Mean Arterial Pressure :   97 mmHg   Peripheral Pulse Rate :   77 bpm   Temperature Tympanic :   96.8 DegF(Converted to: 36.0 DegC)  (LOW)    Oxygen Saturation :   98 %   Malika Joya CMA - 12/1/2020 8:34 AM CST   Health Status   Allergies Verified? :   Yes   Medication History Verified? :   Yes   Medical History Verified? :   Yes   Pre-Visit Planning Status :   Completed   Tobacco Use? :   Former smoker   Malika Joya CMA - 12/1/2020 8:34 AM CST   ID Risk Screen   Recent Travel History :   No recent travel   Family Member Travel History :   No recent travel   Other Exposure to Infectious Disease :   Unknown   Malika Joya CMA - 12/1/2020 8:34 AM CST   Social History   Social History   (As Of: 12/1/2020 8:37:29 AM CST)   Alcohol:        1-2 times per month, 1 drinks/episode average.   (Last Updated: 11/25/2014 9:46:31 AM CST by Malika Joya CMA)          Tobacco:        Former smoker, quit more than 30 days ago, Cigarettes   (Last Updated: 12/1/2020 8:35:13 AM CST by Malika Joya CMA)          Electronic Cigarette/Vaping:        Electronic Cigarette Use: Never.   (Last Updated: 12/1/2020 8:35:17 AM CST by Malika Joya CMA)          Employment/School:        Retired, Work/School description: /, [employer].  Previous employment/school: [employer].   (Last Updated: 3/29/2013 11:08:49 AM CDT by Yoanna Porras)           Home/Environment:        Marital status: .  Spouse/Partner name: Vicenta.   (Last Updated: 3/29/2013 11:02:41 AM CDT by Yoanna Porras)          Exercise:        Exercise frequency: Daily.  Exercise type: Aerobics, Walking, Weight lifting.   (Last Updated: 12/20/2016 1:13:09 PM CST by Sonia Cleary)

## 2022-02-15 NOTE — NURSING NOTE
Quick Intake Entered On:  4/29/2021 1:54 PM CDT    Performed On:  4/29/2021 1:54 PM CDT by Rupal White               Summary   Weight Measured :   187.6 lb(Converted to: 187 lb 10 oz, 85.094 kg)    Height Measured :   67 in(Converted to: 5 ft 7 in, 170.18 cm)    Body Mass Index :   29.38 kg/m2 (HI)    Body Surface Area :   2 m2   Height/Length Estimated :   73 cm(Converted to: 2 ft 5 in, 28.74 in)    Rupal White - 4/29/2021 1:54 PM CDT

## 2022-02-15 NOTE — NURSING NOTE
Comprehensive Intake Entered On:  11/23/2021 9:36 AM CST    Performed On:  11/23/2021 9:33 AM CST by Malika Joya CMA               Summary   Chief Complaint :   f/u chronic    Weight Measured :   191.5 lb(Converted to: 191 lb 8 oz, 86.863 kg)    Height Measured :   67 in(Converted to: 5 ft 7 in, 170.18 cm)    Body Mass Index :   29.99 kg/m2 (HI)    Body Surface Area :   2.02 m2   Height/Length Estimated :   73 cm(Converted to: 2 ft 5 in, 28.74 in)    Systolic Blood Pressure :   145 mmHg (HI)    Diastolic Blood Pressure :   71 mmHg   Mean Arterial Pressure :   96 mmHg   Peripheral Pulse Rate :   94 bpm   Temperature Tympanic :   97 DegF(Converted to: 36.1 DegC)  (LOW)    Oxygen Saturation :   100 %   Malika Joya CMA - 11/23/2021 9:33 AM CST   Health Status   Allergies Verified? :   Yes   Medication History Verified? :   Yes   Medical History Verified? :   Yes   Pre-Visit Planning Status :   Completed   Tobacco Use? :   Former smoker   Malika Joya CMA - 11/23/2021 9:33 AM CST   Consents   Consent for Immunization Exchange :   Consent Granted   Consent for Immunizations to Providers :   Consent Granted   Malika Joya CMA - 11/23/2021 9:33 AM CST   Social History   Social History   (As Of: 11/23/2021 9:36:04 AM CST)   Alcohol:        1-2 times per month, 1 drinks/episode average.   (Last Updated: 11/25/2014 9:46:31 AM CST by Malika Joya CMA)          Tobacco:        Former smoker, quit more than 30 days ago, Cigarettes   (Last Updated: 12/1/2020 8:35:13 AM CST by Malika Joya CMA)          Electronic Cigarette/Vaping:        Electronic Cigarette Use: Never.   (Last Updated: 12/1/2020 8:35:17 AM CST by Malika Joya CMA)          Employment/School:        Retired, Work/School description: /, [employer].  Previous employment/school: [employer].   (Last Updated: 3/29/2013 11:08:49 AM CDT by Yoanna Porras)          Home/Environment:        Marital  status: .  Spouse/Partner name: Vicenta.   (Last Updated: 3/29/2013 11:02:41 AM CDT by Yoanna Porras)          Exercise:        Exercise frequency: Daily.  Exercise type: Aerobics, Walking, Weight lifting.   (Last Updated: 12/20/2016 1:13:09 PM CST by Sonia Cleary)

## 2022-02-15 NOTE — LETTER
(Inserted Image. Unable to display)   April 19, 2021    [place] [address]            Dear ROSALIO,      Thank you for selecting [facility] for your healthcare needs.    Your Healthcare Provider has recommended that you schedule a diabetes management appointment with our Certified Diabetes Educator, Rupal White RD, CD, CDE.     Please bring your glucose meter and your blood glucose diary to your appointment.    Available services include:  - Education about diabetes with guidance and support   - Education on the 7 Self Care Behaviors for Diabetes Management:     Healthy Eating   portion control, label readings, carbohydrate recommendations, heart healthy guidelines, meal planning    Being Active - Physical activity guidelines     Monitoring   blood glucose targets, evaluation of blood glucose readings and lab results    Taking Medication   medication management    Problem Solving   discuss any concerns/ questions     Healthy Coping   disease progression and management    Reducing Risks   prevention strategies to help avoid potential complications related to uncontrolled diabetes  - Weight loss strategies      (FYI   Regarding office visit: In some instances, a video visit may be offered as an option.)        To schedule an appointment or if you have further questions, please contact your clinic at [phone].      Powered by [facility]    Sincerely,    Rupal White RD, CD, CDE

## 2022-02-15 NOTE — TELEPHONE ENCOUNTER
Entered by Malika Joya CMA on September 24, 2019 4:00:17 PM CDT  talked with pt and per DS recommending switching pharmacy to [facility] place of [facility].  States they work best with pump pts.  I shared this with pt and he agrees to switch.  I advised him to talk to [facility] and bring in his insurance card, they can do a pharm to pharm transfer, or if issues I would be happy to assist.  He will contact us if any issues      ---------------------  From: ROSALIO ANDRADE  To: [employer] ([id number]_[facility])  Sent: 09/24/2019 12:02 p.m. CDT  Subject: RE: FW: Contour Next Test Strips  Merrill Daly,  The Contour Test Strips are Contour Next Strips not 100s.  Rosalio Andrade.       Addendum by Malika Joya CMA on September 24, 2019 9:42:53 AM CDT  ---------------------  From: Malika Joya CMA ([employer] Message Pool ([id number]_[facility]))  To: ROSALIO ANDRADE  Sent: 9/24/2019 9:42:53 AM CDT  Subject: RE: FW: Contour 100 Test Strips       Ok I just got off the phone with Medicare and they are advising that you contact Member Services and she said they can often do an instant override.  She said if that doesn t work then we can try doing the prior authorization(they should already have the script on file from us indicating the reason for increased testing)       Member services # [phone]  Reference# [id number]       Rosalio please let me know if this doesn t work and then I will start working on the PA       Malika  ---------------------  From: Bob/Fatuma ROCHA (Phone Messages Pool ([id number]_[facility]))  To: [employer] ([id number]);  Sent: 9/23/2019 12:13:20 PM CDT  Subject: FW: FW: Contour 100 Test Strips                      ---------------------  From: ROSALIO ANDRADE  To: [facility]  Sent: 09/23/2019 09:04 a.m. CDT  Subject: RE: FW: Contour 100 Test Strips  Merrill Burris,  When [facility], she said that Dr. Mcqueen has to contact Medicare for approval. I  am on my last test strips.  ThanksRosalio.  ---------------------  From: Bob/Fatuma ROCHA (Phone Messages Pool ([id number]_[facility]))  To: ROSALIO ANDRADE  Sent: 9/23/2019 8:35:19 AM CDT  Subject: FW: Contour 100 Test Strips       Malika Glass and Dr. Mcqueen are not in clinic today, but they will be here tomorrow 9/24/19 from 8-5. Are you having problems with your insurance not covering them or do you just need a refill?       Thanks,       Fatuma TINAJERO MA.            ---------------------  From: ROSALIO ANDRADE  To: [facility]  Sent: 09/20/2019 07:07 p.m. CDT  Subject: Contour 100 Test Strips  Malika Joya,  I am still having an issue with test strips.  Been working on it for over a week and am running short. [facility] in [place] wants Dr. Mcqueen to call Medicare for new prescription, 4 test strips/day. HELP.  ThanksRosalio.

## 2022-02-15 NOTE — NURSING NOTE
Hearing and Vision Screening Entered On:  5/3/2019 9:58 AM CDT    Performed On:  5/3/2019 9:57 AM CDT by Malika Joya CMA               Hearing and Vision Screening   Audiogram Result Right Ear :   Fail   Audiogram Result Left Ear :   Fail   Hearing Screen Comments :   missed 2000 and 4000hz in right ear and 4000hz in left ear   Malika Joya CMA - 5/3/2019 9:57 AM CDT

## 2022-02-15 NOTE — NURSING NOTE
Quick Intake Entered On:  9/24/2021 9:20 AM CDT    Performed On:  9/22/2021 9:19 AM CDT by Rupal White               Summary   Weight Measured :   188.9 lb(Converted to: 188 lb 14 oz, 85.684 kg)    Height Measured :   67 in(Converted to: 5 ft 7 in, 170.18 cm)    Body Mass Index :   29.58 kg/m2 (HI)    Body Surface Area :   2.01 m2   Height/Length Estimated :   73 cm(Converted to: 2 ft 5 in, 28.74 in)    Rupal White - 9/24/2021 9:19 AM CDT

## 2022-02-15 NOTE — TELEPHONE ENCOUNTER
Entered by Malika Joya CMA on September 23, 2021 1:50:29 PM CDT  ---------------------  From: Malika Joya CMA   To: [facility]    Sent: 9/23/2021 1:50:29 PM CDT  Subject: FW: Medication Management     ** Submitted: **  Order:pravastatin (pravastatin 20 mg oral tablet)  1 tab(s)  Oral  qhs  Qty:  30 tab(s)        Refills:  0          Substitutions Allowed     Route To Pharmacy - [facility]    Signed by Malika Joya CMA  9/23/2021 6:49:00 PM [facility]    ** Submitted: **  Complete:pravastatin (pravastatin 20 mg oral tablet)   Signed by Malika Joya CMA  9/23/2021 6:50:00 PM [facility]    ** Not Approved:  **  pravastatin (PRAVASTATIN SODIUM 20MG TABLET)  TAKE ONE TABLET BY MOUTH ONCE DAILY AT BEDTIME  Qty:  90 tab(s)        Days Supply:  90        Refills:  3          Substitutions Allowed     Route To Pharmacy - [facility]   Signed by Malika Joya CMA            ** Submitted: **  Order:levothyroxine (levothyroxine 175 mcg (0.175 mg) oral tablet)  1 tab(s)  Oral  daily  Qty:  30 tab(s)        Refills:  0          Substitutions Allowed     Route To Pharmacy - [facility]    Signed by Malika Joya CMA  9/23/2021 6:49:00 PM [facility]    ** Submitted: **  Complete:levothyroxine (levothyroxine 175 mcg (0.175 mg) oral tablet)   Signed by Malika Joya CMA  9/23/2021 6:49:00 PM [facility]    ** Not Approved:  **  levothyroxine (LEVOTHYROXINE SODIUM 175MCG TABLET)  TAKE 1 TABLET BY MOUTH ONCE DAILY  Qty:  90 tab(s)        Days Supply:  90        Refills:  3          Substitutions Allowed     Route To Pharmacy - [facility]   Signed by Malika Joya CMA          due for 6month check next month  okay for #30 on both  last TSH 4/2021  last lipid 11/2020      ---------------------  From: Malika Joya CMA ([employer] Pool ([id number]_[facility]))   To: [employer] Message Pool ([id number]_[place]);     Sent: 9/23/2021 10:21:15 AM CDT  Subject: FW: Medication Management   Due Date/Time: 9/23/2021 1:36:00 PM CDT            ------------------------------------------  From: [employer]  To: Kb Mcqueen MD  Sent: September 21, 2021 1:36:45 PM CDT  Subject: Medication Management  Due: September 17, 2021 11:19:26 PM CDT     ** On Hold Pending Signature **     Drug: levothyroxine (levothyroxine 175 mcg (0.175 mg) oral tablet), TAKE 1 TABLET BY MOUTH ONCE DAILY  Quantity: 90 tab(s)  Days Supply: 90  Refills: 3  Substitutions Allowed  Notes from Pharmacy:     Dispensed Drug: levothyroxine (levothyroxine 175 mcg (0.175 mg) oral tablet), TAKE 1 TABLET BY MOUTH ONCE DAILY  Quantity: 90 tab(s)  Days Supply: 90  Refills: 3  Substitutions Allowed  Notes from Pharmacy:     ** On Hold Pending Signature **     Drug: pravastatin (pravastatin 20 mg oral tablet), TAKE ONE TABLET BY MOUTH ONCE DAILY AT BEDTIME  Quantity: 90 tab(s)  Days Supply: 90  Refills: 3  Substitutions Allowed  Notes from Pharmacy:     Dispensed Drug: pravastatin (pravastatin 20 mg oral tablet), TAKE ONE TABLET BY MOUTH ONCE DAILY AT BEDTIME  Quantity: 90 tab(s)  Days Supply: 90  Refills: 3  Substitutions Allowed  Notes from Pharmacy:  ------------------------------------------

## 2022-02-15 NOTE — NURSING NOTE
Diabetes Eye Testing Entered On:  10/5/2021 12:18 PM CDT    Performed On:  8/30/2021 12:18 PM CDT by Jesica Marcelo               Diabetes Eye Testing   Retinopathy Present TR :   Yes   Presence of Macular Edema TR :   No   Dilated Retinal Exam Date TR :   8/30/2021 CDT   Jesica Marcelo - 10/5/2021 12:18 PM CDT

## 2022-02-15 NOTE — PROGRESS NOTES
Patient:   ROSALIO ANDRADE            MRN: [id number]            FIN: [id number]               Age:   76 years     Sex:  Male     :  1945   Associated Diagnoses:   Diabetes mellitus type 1; Shingles   Author:   Alessio Gill MD      Visit Information      Date of Service: 2021 11:26 am  Performing Location: [facility]  Encounter#: [id number]      Primary Care Provider (PCP):  Kb Mcqueen MD    NPI# [id number]      Referring Provider:  Alessio Gill MD    NPI# [id number]      Chief Complaint   2021 11:40 AM CDT   Rash x5 days  on right side, chest and back, Decreased appetite and elevated BP        Subjective   Chief complaint 2021 11:40 AM CDT   Rash x5 days  on right side, chest and back, Decreased appetite and elevated BP  .     see chief complaint as noted above and confirmed with the patient  blistery rash on right flank around to breast,  painful especially under arm  started 4-5 days ago    blood sugars elevated to 300      Health Status   Allergies:    Allergic Reactions (Selected)  No known allergies   Medications:  (Selected)   Prescriptions  Prescribed  CONTOUR NEXT TEST STRIP: See Instructions, Instructions: TEST 4 TIMES A DAY, # 400 unknown unit, 3 Refill(s), Type: Maintenance, Pharmacy: [id number], TEST 4 TIMES A DAY  CONTOUR NEXT TEST STRIPS: CONTOUR NEXT TEST STRIPS, See Instructions, Instructions: pt testing 4 times daily- pt has a pump and needing to test more often due to fluctuating BS's - Dx - Z96.41 and E10.9, Supply, # 400 EA, 3 Refill(s), Type: Maintenance, [facility]acy[facility]  Dexcom G6 : Dexcom G6 , See Instructions, Instructions: use to see glucose readings, Supply, # 1 EA, 1 Refill(s), Type: Maintenance  Dexcom G6 sensors: Dexcom G6 sensors, See Instructions, Instructions: change sensor every 10 days  E10.9, Supply, # 9 EA, 11 Refill(s), Type: Maintenance  Dexcom G6 transmitter: Dexcom G6 transmitter, See  Instructions, Instructions: change every 3 months, Supply, # 1 EA, 3 Refill(s), Type: Maintenance  Glucose tabs: Glucose tabs, See Instructions, Instructions: use as needed for hypoglycemia, # 100 EA, 3 Refill(s), Type: Maintenance, Pharmacy: [facility]  Lidoderm 5% topical film: 3 patch(es), Topical, daily, Instructions: remove patches after 12 hours, # 30 patch(es), 0 Refill(s), Type: Maintenance, Pharmacy: [facility], 3 patch(es) Topical daily,Instr:remove patches after 12 hours, 67, in, 11/01/21 11:40:00 CDT, Height Kitty...  NovoLOG 100 units/mL injectable solution: See Instructions, Instructions: INJECT UP TO 70 UNITS DAILY VIA PUMP-  Dx E10.9, # 90 mL, 3 Refill(s), Type: Maintenance, Pharmacy: [facility], INJECT UP TO 70 UNITS DAILY VIA PUMP-  Dx E10.9, 67, in, 12/01/20 8:34:00 CST, Height Measured, 191.4, lb...  ULTRA-FINE III SHORT PEN NEEDL MG INJECTABLE: ULTRA-FINE III SHORT PEN NEEDL MG INJECTABLE, See Instructions, Instructions: injects 4 times daily, # 400 EA, 11 Refill(s), Pharmacy: [id number]  aspirin 81 mg oral tablet: 1 tab(s) ( 81 mg ), PO, Daily, # 30 tab(s), 0 Refill(s), Type: Maintenance, OTC (Rx)  levothyroxine 175 mcg (0.175 mg) oral tablet: = 1 tab(s), Oral, daily, # 30 tab(s), 0 Refill(s), Type: Maintenance, Pharmacy: [facility], due for visit next month, 1 tab(s) Oral daily, 67, in, 08/19/21 12:37:00 CDT, Height Measured, 188.3, lb, 08/19/21 12:37:00 CDT, Weight Measured  pravastatin 20 mg oral tablet: = 1 tab(s), Oral, qhs, # 30 tab(s), 0 Refill(s), Type: Maintenance, Pharmacy: [facility], due for a visit next month, 1 tab(s) Oral qhs, 67, in, 08/19/21 12:37:00 CDT, Height Measured, 188.3, lb, 08/19/21 12:37:00 CDT, Weight Measured  valACYclovir 1 g oral tablet: = 1 tab(s) ( 1 gm ), Oral, q8 hrs, x 7 day(s), # 21 tab(s), 0 Refill(s), Type: Acute, Pharmacy: [facility], 1 tab(s) Oral q8 hrs,x7 day(s), 67, in, 11/01/21 11:40:00 CDT, Height Measured, 186, lb,  11/01/21 11:40:00 CDT, Weight Measured  Documented Medications  Documented  Metamucil: Oral, 0 Refill(s), Type: Maintenance,    Medications          *denotes recorded medication          ULTRA-FINE III SHORT PEN NEEDL MG INJECTABLE: See Instructions, injects 4 times daily, 400 EA.          CONTOUR NEXT TEST STRIP: See Instructions, TEST 4 TIMES A DAY, 400 unknown unit, 3 Refill(s).          CONTOUR NEXT TEST STRIPS: See Instructions, pt testing 4 times daily- pt has a pump and needing to test more often due to fluctuating BS's - Dx - Z96.41 and E10.9, 400 EA, 3 Refill(s).          Glucose tabs: See Instructions, use as needed for hypoglycemia, 100 EA.          Dexcom G6 : See Instructions, use to see glucose readings, 1 EA, 1 Refill(s).          Dexcom G6 sensors: See Instructions, change sensor every 10 days  E10.9, 9 EA, 11 Refill(s).          Dexcom G6 transmitter: See Instructions, change every 3 months, 1 EA, 3 Refill(s).          aspirin 81 mg oral tablet: 81 mg, 1 tab(s), PO, Daily, 30 tab(s).          NovoLOG 100 units/mL injectable solution: See Instructions, INJECT UP TO 70 UNITS DAILY VIA PUMP-  Dx E10.9, 90 mL, 3 Refill(s).          levothyroxine 175 mcg (0.175 mg) oral tablet: 1 tab(s), Oral, daily, 30 tab(s), 0 Refill(s).          Lidoderm 5% topical film: 3 patch(es), Topical, daily, remove patches after 12 hours, 30 patch(es), 0 Refill(s).          pravastatin 20 mg oral tablet: 1 tab(s), Oral, qhs, 30 tab(s), 0 Refill(s).          *Metamucil: Oral, 0 Refill(s).          valACYclovir 1 g oral tablet: 1 gm, 1 tab(s), Oral, q8 hrs, for 7 day(s), 21 tab(s), 0 Refill(s).       Problem list:    All Problems (Selected)  Hyperlipidemia / 35612376 / Confirmed  Hypothyroid / 84527404 / Confirmed  2007  Diabetes mellitus type 1 / [id number] / Confirmed  2007  Essential tremor / [id number] / Confirmed  Intolerance of drug / [id number] / Confirmed  Insulin pump in place / [id number] / Confirmed       Objective   Vital Signs   11/1/2021 11:40 AM CDT Temperature Tympanic 97.2 DegF  LOW    Peripheral Pulse Rate 68 bpm    Pulse Site Radial artery    HR Method Electronic    Systolic Blood Pressure 160 mmHg  HI    Diastolic Blood Pressure 92 mmHg  HI    Mean Arterial Pressure 115 mmHg    BP Site Right arm    BP Method Electronic    Oxygen Saturation 97 %      Measurements from flowsheet : Measurements   11/1/2021 11:40 AM CDT Height Measured - Standard 67 in    Height/Length Estimated 73 cm    Weight Measured - Standard 186 lb    BSA 2 m2    Body Mass Index 29.13 kg/m2  HI      T4 dermatomal blisters on right  no drainage      Results Review   Results review   Lab results   8/19/2021 1:07 PM CDT Sodium Level 134 mmol/L  LOW    Potassium Level 4.5 mmol/L    Chloride Level 101 mmol/L    CO2 Level 25 mmol/L    Glucose Level 230 mg/dL  HI    BUN 15 mg/dL    Creatinine 0.87 mg/dL    BUN/Creat Ratio NOT APPLICABLE    eGFR 84 mL/min/1.73m2    eGFR African American 98 mL/min/1.73m2    Calcium Level 8.8 mg/dL    Hgb A1c 7.8  HI         Impression and Plan   Assessment and Plan:          Diagnosis: Diabetes mellitus type 1 (ERA33-FC E10.9), Shingles (ONC20-[place] B02.9).         Course: use pump and monitor to watch blood sugars and will extra dilligent    discussed topical patches for pain  although more days than ideal will try valacyclovir.

## 2022-02-15 NOTE — PROGRESS NOTES
Patient:   ROSALIO ANDRADE            MRN: [id number]            FIN: [id number]               Age:   73 years     Sex:  Male     :  1945   Associated Diagnoses:   Hypothyroid; Hyperlipidemia; Diabetes mellitus type 1; Tremor, Essential   Author:   Kb Mcqueen MD      Visit Information      Date of Service: 05/15/2018 09:01 am  Performing Location: [facility]  Encounter#: [id number]      Primary Care Provider (PCP):  Kb Mcqueen MD    NPI# [id number]      Referring Provider:  Kb Mcqueen MD    NPI# [id number]      Chief Complaint   5/15/2018 9:10 AM CDT    DM check - d/c  pravastatin 1wk ago due to myalgias all over.  Sx have improved since d/c              Additional Information:No additional information recorded during visit.   Chief complaint and symptoms as noted above and confirmed with patient      History of Present Illness   4/10/2014: Rosalio presents to clinic to [facility] care, previously followed by [facility] approximately the last 10 yrs. he s been diagnosed with adult onset type 1 diabetes, insulin managed with a combination of basal bolus insulin therapy.  Historically his glycemic control has not been optimal. Is very knowledgeable about carbohydrate counting and insulin to carbohydrate ratios.  He says that his main nemesis is evening snacking.  He will typically bolus with NovoLog when eating popcorn, otherwise does not bolus with snacking.  He has no known diabetic complications.  He remains very active with daily exercise, primarily swimming.  He swam back in college.  He resides in [place] for a portion of the year.  He s been resistant to starting on cholesterol medicines in the past.  Also with complaints of left sided elbow/forearm pains - not improved with forearm brace.  He is a retired orchestral director and plays base cello - arm pains limiting his abilities.    2015: Rosalio presents to clinic for regular diabetic follow-up.  No hypoglycemic complaints.  Labs reviewed with  him.  Complaints of right-sided anterior shoulder discomfort that it has been limiting his swimming activities for the past 4-5 months.  He does express some interest in potentially pursuing an insulin pump.  Planning on doing winter camping up in the [facility] [facility].    8/11/2017: Presents for follow-up related to his type 1 diabetes. He started on insulin pump therapy this past winter. He has been doing very well with this. He takes off pump for showering and when swimming. Overall is really liked this transition. Minimal issues related to cannulation problems.    12/28/2017: Irwin returns to clinic for follow-up related to his type 1 diabetes.  He has enjoyed pump therapy for most of this year now.  Recently saw Keshia this past week.  Last A1c is 8%.  Adjustments made to pump settings.  He is using bolus wizard on a regular basis.  No issues with hypoglycemia.  No pump malfunction.  He has been able to enjoy his daily swimming without interruption.  Electing not to pursue regular PSA testing.    5/15/2018: Irwin returns for regular diabetic follow-up.  Recently returned from [place].  Rather uneventful winter healthwise.  Doing very well with insulin pump therapy as a whole.  No cannula issues.  Feels like overall blood sugars much better controlled.  Denies any hypoglycemic episodes.  Does describe some morning related myalgias.  Stopped his pravastatin in entirety approximately 1 week ago and states that myalgia substantially improved.         Review of Systems   Constitutional:  No fever, No chills.    Eye:  Negative except as documented in history of present illness.    Ear/Nose/Mouth/Throat:  Negative except as documented in history of present illness.    Respiratory:  No shortness of breath.    Cardiovascular:  No chest pain, No palpitations, No peripheral edema, No syncope.    Gastrointestinal:  No nausea, No vomiting, No abdominal pain.    Genitourinary:  ED, No dysuria, No hematuria.     Hematology/Lymphatics:  Negative except as documented in history of present illness.    Endocrine:  No excessive thirst, No polyuria.    Immunologic:  No recurrent fevers.    Musculoskeletal:  Muscle pain, No joint pain.    Neurologic:  Alert and oriented X4, No numbness, No tingling, No headache.       Health Status   Allergies:    Nonallergic Reactions (Selected)  Severity Not Documented  Atorvastatin (Joint pain)   Medications:  (Selected)   Prescriptions  Prescribed  B-D PEN NDL SHRT [id number](5/16) CHRISTINA: B-D PEN NDL SHRT [id number](5/16) CHRISTINA, See Instructions, Instructions: INJECT FOUR TIMES DAILY AS DIRECTED, Supply, # 400 EA, 3 Refill(s), Pharmacy: [facility], keep on hold and pt will notify when needed, INJECT FOUR TIMES DAILY AS DIRECTED...  CONTOUR TEST STRIPS 100'S: CONTOUR TEST STRIPS 100'S, See Instructions, Instructions: TEST FOUR TIMES DAILY AS DIRECTED, Supply, # 400 EA, 3 Refill(s), Pharmacy: [facility], keep on hold and pt will notify when needed, TEST FOUR TIMES DAILY AS DIRECTED  Contour next test strips: Contour next test strips, See Instructions, Instructions: testing 4x/ day - new start for insulin pump, Supply, # 8 bottle(s), 3 Refill(s), Type: Maintenance, Pharmacy: [facility] [id number], testing 4x/ day - new start for insulin pump  Contour test strips and lancets: Contour test strips and lancets, See Instructions, Instructions: testing 4 times daily, # 400 EA, 11 Refill(s), Type: Soft Stop, Pharmacy: [facility] [id number]  Glucagon Emergency Kit for Low Blood Sugar 1 mg injection: See Instructions, Instructions: 1mg injection if pt unable to safely consume glucose, # 1 EA, 0 Refill(s), Type: Maintenance, Pharmacy: [facility], pt will have free micky coupon, 1mg injection if pt unable to safely consume glucose  Glucose tabs: Glucose tabs, See Instructions, Instructions: use as needed for hypoglycemia, # 100 EA, 3 Refill(s), Type:  Maintenance, Pharmacy: [facility]  NovoLOG 100 units/mL injectable solution: See Instructions, Instructions: 70 units/day use with insulin pump, # 60 mL, 1 Refill(s), Type: Maintenance, Pharmacy: [facility], 70 units/day use with insulin pump  ULTRA-FINE III SHORT PEN NEEDL MG INJECTABLE: ULTRA-FINE III SHORT PEN NEEDL MG INJECTABLE, See Instructions, Instructions: injects 4 times daily, # 400 EA, 11 Refill(s), Pharmacy: [facility] [id number]  aspirin 81 mg oral tablet: 1 tab(s) ( 81 mg ), PO, Daily, # 30 tab(s), 0 Refill(s), Type: Maintenance, OTC (Rx)  clobetasol 0.05% topical cream: 1 varsha, TOP, BID, Instructions: apply a thin film; apply to affected rash, # 15 g, 1 Refill(s), Type: Maintenance, Pharmacy: [facility] [id number], 1 varsha top bid,Instr:apply a thin film; apply to affected rash  levothyroxine 200 mcg (0.2 mg) oral tablet: 1 tab(s) ( 200 mcg ), po, daily, # 90 tab(s), 1 Refill(s), Type: Maintenance, Pharmacy: [facility], keep on hold and pt will notify when needed, 1 tab(s) po daily  pravastatin 20 mg oral tablet: 1 tab(s) ( 20 mg ), PO, hs, # 90 tab(s), 1 Refill(s), Type: Maintenance, Pharmacy: [facility], 1 tab(s) po hs  Documented Medications  Documented  Cinnamon: ( 1,000 mg ), po, daily, 0 Refill(s), Type: Maintenance  Fish Oil: 0  Flax Seed Oil: 0  Metamucil: See Instructions, Instructions: 1 tbsp daily, 0 Refill(s), Type: Maintenance  Multiple Vitamins oral capsule: 1 cap(s), PO, Daily, 0 Refill(s)  Vitamin C: ( 1,000 mg ), daily, 0 Refill(s)   Problem list:    All Problems  Diabetes mellitus type 1 / ICD-9-.01 / Confirmed  Intolerance of drug / SNOMED CT 71241320 / Confirmed  Essential tremor / ICD-9-.1 / Confirmed  Hyperlipidemia / SNOMED CT 51567652 / Confirmed  Hypothyroid / ICD-9-.9 / Confirmed  Canceled: Long term (current) use of insulin / SNOMED CT 463888495      Histories   Past Medical  "History:    Active  Hyperlipidemia (43473415)  Hypothyroid (244.9)  Comments:  2010 CST 1:40 PM CST - Kassi Link CMA  2007  Diabetes mellitus type 1 (250.01)  Comments:  2010 CST 1:41 PM GARCIA - Kassi Link CMA  2007  Essential tremor (333.1)   Family History:    Diabetes mellitus type I  Father ()  Bowel obstruction  Father ()  [place] - Cancer of uterus  Mother ()     Procedure history:    Screening for malignant neoplasm of colon (6086027612) on 2015 at 70 Years.  Comments:  2016 8:15 AM - Adali Pantoja is negative  Sialogram ([id number]) on 2006 at 60 Years.  Comments:  3/29/2013 10:58 AM - Yoanna Porras  \"Stone.  Swollen gland.\"  removal of stone, left mandibular gland in the month of 2005 at 60 Years.  Sigmoidoscopy ([id number]) on 10/17/2002 at 57 Years.   Social History:        Alcohol Assessment: Low Risk            1-2 times per month, 1 drinks/episode average.      Tobacco Assessment: Past      Substance Abuse Assessment: Denies Substance Abuse      Employment and Education Assessment            Retired, Work/School description: /, [employer].  Previous               employment/school: [employer].      Home and Environment Assessment            Marital status: .  Spouse/Partner name: Vicenta.      Exercise and Physical Activity Assessment: Regular exercise            Exercise frequency: Daily.  Exercise type: Aerobics, Walking, Weight lifting.        Physical Examination   vital signs stable, as noted above   Vital Signs   5/15/2018 9:10 AM CDT Temperature Tympanic 97.7 DegF  LOW    Peripheral Pulse Rate 64 bpm    Systolic Blood Pressure 128 mmHg    Diastolic Blood Pressure 70 mmHg    Mean Arterial Pressure 89 mmHg    BP Site Right arm      Measurements from flowsheet : Measurements   5/15/2018 9:10 AM CDT    Weight Measured - Standard                181.8 lb     General:  " Alert and oriented, No acute distress.    Eye:  Extraocular movements are intact.    HENT:  Normocephalic, Tympanic membranes are clear, Oral mucosa is moist.    Neck:  Supple, Non-tender, No lymphadenopathy.    Respiratory:  Lungs are clear to auscultation, Respirations are non-labored.    Cardiovascular:  Normal rate, Regular rhythm, No murmur, No edema.    Gastrointestinal:  Soft, Non-tender, Non-distended, Wearing insulin pump.    Musculoskeletal:  Normal range of motion, Normal strength.    Neurologic:  Alert, Oriented, Normal motor function, No focal deficits.    Cognition and Speech:  Oriented, Speech clear and coherent.    Psychiatric:  Appropriate mood & affect.       Review / Management   Results review:  Lab results   5/7/2018 9:37 AM CDT Sodium Level 130 mmol/L  LOW    Potassium Level 4.8 mmol/L    Chloride Level 96 mmol/L  LOW    CO2 Level 30 mmol/L    Glucose Level 429 mg/dL  HI    BUN 17 mg/dL    Creatinine 1.00 mg/dL    BUN/Creat Ratio NOT APPLICABLE    eGFR 75 mL/min/1.73m2    eGFR African American 87 mL/min/1.73m2    Calcium Level 9.4 mg/dL    Hgb A1c 7.7  HI   .       Impression and Plan   Diagnosis     Hypothyroid (OQJ37-YK E03.9).     Hyperlipidemia (BTD18-DK E78.5).     Diabetes mellitus type 1 (DWF83-LF E10.9).     Tremor, Essential (FJW75-BU G25.0).       .) type I DM, controlled, late onset diabetes in adulthood (ROLANDO)  hypoglycemic medications:  insulin therapy:  Medtronic pump (initiated in '17): ICR: 1:7, sensitivity 40mg/dL; basal 0.5-0.625/hr (overall liking pump therapy)    - 70% bolus needs  last HbA1c: 7.7%   microvascular complications: none  macrovascular complications: none  ASA/SIMON/statin:  ASA 81mg daily, pravastatin 20mg qhs (advised QOD due to myalgias)    .) ED   - cialis prn    health maintenance   -Cologuard negative (12/2015)   - PSA 0.3 (11/2015) - declines regular future screening   - enrolled in CCM   - recommending Shingrix    RTC in 4 months      Professional Services    Counseling Summary:  This was a 25 minute visit with greater than 50% of that time spent counseling the patient.

## 2022-02-15 NOTE — TELEPHONE ENCOUNTER
---------------------  From: Genet Nunez LPN (Phone Messages Pool ([id number]_[facility]))   To: [facility] Message Pool ([id number]_[place] [facility] [place]);     Sent: 9/24/2019 11:34:02 AM CDT  Subject: FW: Test Strips           ---------------------  From: ROSALIO MALLOY  To: [facility]  Sent: 09/24/2019 11:10 a.m. CDT  Subject: Test Strips  Hi Malika,  Please call me regarding my test strips. [phone], [phone].  Thanks,  Rosalio Malloy.contacted pt - see other messages

## 2022-02-15 NOTE — NURSING NOTE
Comprehensive Intake Entered On:  5/3/2019 9:57 AM CDT    Performed On:  5/3/2019 9:54 AM CDT by Malika Joya CMA               Summary   Chief Complaint :   DM check   Weight Measured :   181.8 lb(Converted to: 181 lb 13 oz, 82.46 kg)    Systolic Blood Pressure :   125 mmHg   Diastolic Blood Pressure :   63 mmHg   Mean Arterial Pressure :   84 mmHg   Peripheral Pulse Rate :   74 bpm   Temperature Tympanic :   97.5 DegF(Converted to: 36.4 DegC)  (LOW)    Malika Joya CMA - 5/3/2019 9:54 AM CDT   Health Status   Allergies Verified? :   Yes   Medication History Verified? :   Yes   Medical History Verified? :   Yes   Pre-Visit Planning Status :   Completed   Tobacco Use? :   Never smoker   Malika Joya CMA - 5/3/2019 9:54 AM CDT

## 2022-02-15 NOTE — TELEPHONE ENCOUNTER
---------------------  From: Rupal White   To: Rupal White;     Sent: 9/22/2020 10:02:25 AM CDT  Subject: General Message     Dexcom portal reviewed.      ICR  12:00     7.5   change to 7.0  15:00     7.5    changed to 6.5    Sensitivity  0:00  40        Target   120 - 120     Basal:   18.20u/ day   MN   0.550  change to 0.500    3am   0.650 change to 0.625  6:30am  0.850    13:00 0.850    22:00  0.550 change to 0.500

## 2022-02-15 NOTE — TELEPHONE ENCOUNTER
---------------------  From: Jyoti James CMA (Phone Messages Pool ([id number]_[facility]))   To: ROSALIO ANDRADE    Sent: 11/4/2021 11:37:02 AM CDT  Subject: RE: Renewal of prescription     Kimmie Gayle,    The prescription has been refilled as requested.     Have a good day,  Jyoti SILVA CMA        ---------------------  From: ROSALIO ANDRADE  To: [facility]  Sent: 11/04/2021 11:29 a.m. CDT  Subject: Renewal of prescription  Dr. Mcqueen, will you please renew my prescription for a Dexcom transmitter for the sensor? I think [facility] is waiting for your call. Thanks.  Rosalio Andrade.

## 2022-02-15 NOTE — CARE COORDINATION
Pt appears on  [facility] chronic disease panel as out of parameters for his Alc being at 8.  Things are improving for him and he could recheck his Alc before the end of the year.  Mary is following him though [facility] so have asked her to review with him.

## 2022-02-15 NOTE — PROGRESS NOTES
Patient:   ROSALIO ANDRADE            MRN: [id number]            FIN: [id number]               Age:   74 years     Sex:  Male     :  1945   Associated Diagnoses:   Hypothyroid; Hyperlipidemia; Diabetes mellitus type 1; Tremor, Essential   Author:   Kb Mcqueen MD      Visit Information      Date of Service: 2019 09:02 am  Performing Location: [facility]  Encounter#: [id number]      Primary Care Provider (PCP):  Kb Mcqueen MD    NPI# [id number]      Referring Provider:  Kb Mcqueen MD    NPI# [id number]      Chief Complaint   2019 9:21 AM CDT    f/u chronic            Additional Information:No additional information recorded during visit.   Chief complaint and symptoms as noted above and confirmed with patient      History of Present Illness   4/10/2014: Rosalio presents to clinic to establish care, previously followed by JAMES approximately the last 10 yrs. he s been diagnosed with adult onset type 1 diabetes, insulin managed with a combination of basal bolus insulin therapy.  Historically his glycemic control has not been optimal. Is very knowledgeable about carbohydrate counting and insulin to carbohydrate ratios.  He says that his main nemesis is evening snacking.  He will typically bolus with NovoLog when eating popcorn, otherwise does not bolus with snacking.  He has no known diabetic complications.  He remains very active with daily exercise, primarily swimming.  He swam back in college.  He resides in [place] for a portion of the year.  He s been resistant to starting on cholesterol medicines in the past.  Also with complaints of left sided elbow/forearm pains - not improved with forearm brace.  He is a retired orchestral director and plays base cello - arm pains limiting his abilities.    2019:  Rosalio returns to clinic for regular diabetic follow-up.  Doing well.  Anticipate leaving for [place] temporarily this fall.  Did meet with Keshia in July.  Some pump adjustments made at  that time.  Does identify afternoon hypoglycemia trends.  Otherwise no timeframe with consistent hypoglycemia.  Is testing regularly and using bolus wizard.  We reviewed his pump settings today.  A1c remains in mid eights.  Did have positive Cologuard testing the spring underwent subsequent diagnostic colonoscopy demonstrating multiple tubular adenomas with plan 3-year follow-up.         Review of Systems   Constitutional:  No fever, No chills.    Eye:  Negative except as documented in history of present illness.    Ear/Nose/Mouth/Throat:  Negative except as documented in history of present illness.    Respiratory:  No shortness of breath.    Cardiovascular:  No chest pain, No palpitations, No peripheral edema, No syncope.    Gastrointestinal:  No nausea, No vomiting, No abdominal pain.    Genitourinary:  ED, No dysuria, No hematuria.    Hematology/Lymphatics:  Negative except as documented in history of present illness.    Endocrine:  No excessive thirst, No polyuria.    Immunologic:  No recurrent fevers.    Musculoskeletal:  No joint pain, No muscle pain.    Neurologic:  Alert and oriented X4, tremor, No numbness, No tingling, No headache.       Health Status   Allergies:    Nonallergic Reactions (Selected)  Severity Not Documented  Atorvastatin (Joint pain)   Medications:  (Selected)   Prescriptions  Prescribed  CONTOUR NEXT TEST STRIP: See Instructions, Instructions: TEST 4 TIMES A DAY, # 400 unknown unit, 3 Refill(s), Type: Maintenance, Pharmacy: [id number], TEST 4 TIMES A DAY  CONTOUR NEXT TEST STRIPS: CONTOUR NEXT TEST STRIPS, See Instructions, Instructions: pt testing 4 times daily- pt has a pump and needing to test more often due to fluctuating BS's - Dx - Z96.41 and E10.9, Supply, # 400 EA, 3 Refill(s), Type: Maintenance  Contour test strips and lancets: Contour test strips and lancets, See Instructions, Instructions: testing 4 times daily, # 400 EA, 11 Refill(s), Type: Soft Stop  Glucagon  Emergency Kit for Low Blood Sugar 1 mg injection: See Instructions, Instructions: 1mg injection if pt unable to safely consume glucose, # 1 EA, 0 Refill(s), Type: Maintenance, Pharmacy: [facility] [id number] [facility], keep on hold, 1mg injection if pt unable to safely consume glucose  Glucose tabs: Glucose tabs, See Instructions, Instructions: use as needed for hypoglycemia, # 100 EA, 3 Refill(s), Type: Maintenance, Pharmacy: [facility]  NovoLOG 100 units/mL injectable solution: See Instructions, Instructions: INJECT UP TO 70 UNITS DAILY VIA PUMP, # 90 mL, 4 Refill(s), Type: Maintenance, Pharmacy: [place] [facility] [id number] [facility] [place], INJECT UP TO 70 UNITS DAILY VIA PUMP  ULTRA-FINE III SHORT PEN NEEDL MG INJECTABLE: ULTRA-FINE III SHORT PEN NEEDL MG INJECTABLE, See Instructions, Instructions: injects 4 times daily, # 400 EA, 11 Refill(s), Pharmacy: [facility] [id number]  aspirin 81 mg oral tablet: 1 tab(s) ( 81 mg ), PO, Daily, # 30 tab(s), 0 Refill(s), Type: Maintenance, OTC (Rx)  levothyroxine 200 mcg (0.2 mg) oral tablet: = 1 tab(s) ( 200 mcg ), po, daily, # 90 tab(s), 3 Refill(s), Type: Maintenance, Pharmacy: [facility] [id number] [facility], keep on hold and pt will notify when needed, 1 tab(s) Oral daily  pravastatin 20 mg oral tablet: = 1 tab(s) ( 20 mg ), PO, hs, # 90 tab(s), 3 Refill(s), Type: Maintenance, Pharmacy: [facility] [id number] [facility], keep on file and pt will notify when needed, 1 tab(s) Oral hs,    Medications          *denotes recorded medication          ULTRA-FINE III SHORT PEN NEEDL MG INJECTABLE: See Instructions, injects 4 times daily, 400 EA.          CONTOUR NEXT TEST STRIP: See Instructions, TEST 4 TIMES A DAY, 400 unknown unit, 3 Refill(s).          CONTOUR NEXT TEST STRIPS: See Instructions, pt testing 4 times daily- pt has a pump and needing to test more often due to fluctuating BS's - Dx - Z96.41 and E10.9, 400 EA, 3 Refill(s).          Glucose tabs: See Instructions, use as needed for hypoglycemia, 100 EA.          Contour  test strips and lancets: See Instructions, testing 4 times daily, 400 EA, 11 Refill(s).          aspirin 81 mg oral tablet: 81 mg, 1 tab(s), PO, Daily, 30 tab(s).          Glucagon Emergency Kit for Low Blood Sugar 1 mg injection: See Instructions, 1mg injection if pt unable to safely consume glucose, 1 EA, 0 Refill(s).          NovoLOG 100 units/mL injectable solution: See Instructions, INJECT UP TO 70 UNITS DAILY VIA PUMP, 90 mL, 4 Refill(s).          levothyroxine 200 mcg (0.2 mg) oral tablet: 200 mcg, 1 tab(s), po, daily, 90 tab(s), 3 Refill(s).          pravastatin 20 mg oral tablet: 20 mg, 1 tab(s), PO, hs, 90 tab(s), 3 Refill(s).       Problem list:    All Problems  Intolerance of drug / SNOMED CT 48930704 / Confirmed  Essential tremor / SNOMED CT 3329434953 / Confirmed  Hyperlipidemia / SNOMED CT 94972485 / Confirmed  Hypothyroid / SNOMED CT 68816874 / Confirmed  Insulin pump in place / SNOMED CT 6706329573 / Confirmed  Diabetes mellitus type 1 / SNOMED CT 330359040 / Confirmed  Canceled: Long term (current) use of insulin / SNOMED CT 081280949      Histories   Past Medical History:    Active  Hyperlipidemia (24415076)  Hypothyroid (73679449)  Comments:  2010 CST 1:40 PM Kassi Gonzalez CMA  2007  Diabetes mellitus type 1 (470624103)  Comments:  2010 CST 1:41 PM Kassi Gonzalez CMA  2007  Essential tremor ([id number])   Family History:    Diabetes mellitus type I  Father ()  Bowel obstruction  Father ()  CA - Cancer of uterus  Mother ()     Procedure history:    Screening for colon cancer ([id number]) on 2019 at 74 Years.  Comments:  6/3/2019 4:46 PM CDT - Yoanna Danielson MA result:  positive  Recommendation:  needs colonoscopy  Screening for malignant neoplasm of colon ([id number]) on 2015 at 70 Years.  Comments:  2016 8:15 AM GARCIA - Dorschner , Adali  Cologuard is negative  Sialogram ([id number]) on 2006 at 60  "Years.  Comments:  3/29/2013 10:58 AM CDT - Yoanna Porras  \"Stone.  Swollen gland.\"  removal of stone, left mandibular gland in the month of 12/2005 at 60 Years.  Sigmoidoscopy ([id number]) on 10/17/2002 at 57 Years.   Social History:        Alcohol Assessment            1-2 times per month, 1 drinks/episode average.      Tobacco Assessment            Former smoker, quit more than 30 days ago, Cigarettes      Employment and Education Assessment            Retired, Work/School description: /, [employer].  Previous               employment/school: [employer].      Home and Environment Assessment            Marital status: .  Spouse/Partner name: Vicenta.      Exercise and Physical Activity Assessment            Exercise frequency: Daily.  Exercise type: Aerobics, Walking, Weight lifting.        Physical Examination   vital signs stable, as noted above   Vital Signs   9/20/2019 10:06 AM CDT Systolic Blood Pressure 130 mmHg    Diastolic Blood Pressure 60 mmHg    Mean Arterial Pressure 83 mmHg   9/20/2019 9:21 AM CDT Temperature Tympanic 96.6 DegF  LOW    Peripheral Pulse Rate 60 bpm    Systolic Blood Pressure 142 mmHg  HI    Diastolic Blood Pressure 70 mmHg    Mean Arterial Pressure 94 mmHg    BP Site Right arm      Measurements from flowsheet : Measurements   9/20/2019 10:06 AM CDT Height Measured - Standard 68.5 in   9/20/2019 9:21 AM CDT Weight Measured - Standard 182 lb      General:  Alert and oriented, No acute distress.    Eye:  Extraocular movements are intact.    HENT:  Normocephalic, Tympanic membranes are clear, Oral mucosa is moist.    Neck:  Supple, Non-tender, No lymphadenopathy.    Respiratory:  Lungs are clear to auscultation, Respirations are non-labored.    Cardiovascular:  Normal rate, Regular rhythm, No murmur, No edema.    Gastrointestinal:  Soft, Non-tender, Non-distended, Wearing insulin pump.    Musculoskeletal:  Normal range of " motion, Normal strength.    Neurologic:  Alert, Oriented, Normal motor function, No focal deficits.    Cognition and Speech:  Oriented, Speech clear and coherent.    Psychiatric:  Appropriate mood & affect.       Review / Management   Results review:  Lab results   9/13/2019 8:21 AM CDT Hgb A1c 8.5  HI    Cholesterol 205 mg/dL  HI    Non-  HI    HDL 71 mg/dL    Chol/HDL Ratio 2.9      HI    Triglyceride 68 mg/dL    TSH 0.10 mIU/L  LOW   .       Impression and Plan   Diagnosis     Hypothyroid (RLU62-QY E03.9).     Hyperlipidemia (WNG79-SQ E78.5).     Diabetes mellitus type 1 (KPS01-BD E10.9).     Tremor, Essential (ZMO49-LF G25.0).       .) type I DM, uncontrolled, late onset diabetes in adulthood (ROLANDO)  hypoglycemic medications:  insulin therapy:  [employer] pump (initiated in '17): changed ICR today to 1:7 throughout day, sensitivity 40mg/dL; basal 0.5-0.625/hr (overall liking pump therapy)  ICR=   12:00     7  15:00     8 changed to 7    Sensitivity=  0:00  45  6:30  42   20:30  45      Target     120 - 120     Basal:  16.05  MN   0.575  6:30am  0.750   13:00  0.675      Insulin action  3.5 hours  Max Bolus    17u  Max Basal   1u/ hr    last HbA1c: 8.5%   microvascular complications: none  macrovascular complications: none  ASA/SIMON/statin:  ASA 81mg daily, pravastatin 20mg qhs    .) hypothyroidism   - historically stable dosing   - TSH low (0.10); asymptomatic - no changes for now   - recheck TSH at next visit    .) ED   - cialis prn    health maintenance   -Cologuard positive(5/2019); colonoscopy (6/2019); multiple adenomatous polyps - repeat study in 3 yrs   - PSA 0.3 (11/2015) - declines regular future screening   - intentional tremor (worse in AM) - suspect essential tremor; likely worsened by caffeine.  Low suspicions for parkinsons    RTC in 6 months         Professional Services   Counseling Summary:  This was a 25 minute visit with greater than 50% of that time spent counseling the patient.

## 2022-02-15 NOTE — PROGRESS NOTES
Patient:   ROSALIO ANDRADE            MRN: [id number]            FIN: [id number]               Age:   76 years     Sex:  Male     :  1945   Associated Diagnoses:   Diabetes mellitus type 1; Essential tremor; Insulin pump in place; Hypothyroid; Hyperlipidemia; Rotator cuff tear arthropathy of left shoulder; Preop exam for internal medicine   Author:   Naresh Oliveira MD      Visit Information   Visit type:  Preoperative.    Accompanied by:  No one.    Source of history:  Self.    History limitation:  None.       Chief Complaint   2021 12:37 PM CDT   Patient here for preop at [facility] with Dr. Paul for L rotator cuff surgery on 21.      History of Present Illness    The patient is a 76-year-old Type 1 diabetic scheduled for repair of left rotator cuff tear.  He has had Type 1 diabetes for fifteen years and is on insulin pump with continuous glucose monitoring.  For a previous surgery he continued the pump throughout.       No history of cardiopulmonary disease.  No history of snoring or sleep apnea.  No history of bleeding or complications from anesthesia.  He has been in good health.  He has excellent functional capacity.       He had repair done three months ago but reinjured it when he suddenly reached for an object that was falling.           Review of Systems   Constitutional:  No fatigue, No decreased activity.    Respiratory:  No shortness of breath, No cough.    Cardiovascular:  No chest pain, No palpitations, No peripheral edema.    Gastrointestinal:  No nausea, No vomiting, No abdominal pain.    Genitourinary:  ED, No dysuria, No hematuria.    Hematology/Lymphatics:  No bleeding tendency.    Endocrine:  No excessive thirst, No polyuria.    Musculoskeletal:  Negative except as documented in history of present illness.    Neurologic:  Negative except as documented in history of present illness.       Health Status   Allergies:    Allergic Reactions (Selected)  No known allergies   Medications:   (Selected)   Prescriptions  Prescribed  CONTOUR NEXT TEST STRIP: See Instructions, Instructions: TEST 4 TIMES A DAY, # 400 unknown unit, 3 Refill(s), Type: Maintenance, Pharmacy: [facility] [id number] [facility], TEST 4 TIMES A DAY  CONTOUR NEXT TEST STRIPS: CONTOUR NEXT TEST STRIPS, See Instructions, Instructions: pt testing 4 times daily- pt has a pump and needing to test more often due to fluctuating BS's - Dx - Z96.41 and E10.9, Supply, # 400 EA, 3 Refill(s), Type: Maintenance, Pharmacy: [facility],...  Dexcom G6 : Dexcom G6 , See Instructions, Instructions: use to see glucose readings, Supply, # 1 EA, 1 Refill(s), Type: Maintenance  Dexcom G6 sensors: Dexcom G6 sensors, See Instructions, Instructions: change sensor every 10 days  E10.9, Supply, # 9 EA, 11 Refill(s), Type: Maintenance  Dexcom G6 transmitter: Dexcom G6 transmitter, See Instructions, Instructions: change every 3 months, Supply, # 1 EA, 3 Refill(s), Type: Maintenance  Glucose tabs: Glucose tabs, See Instructions, Instructions: use as needed for hypoglycemia, # 100 EA, 3 Refill(s), Type: Maintenance, Pharmacy: [facility]  NovoLOG 100 units/mL injectable solution: See Instructions, Instructions: INJECT UP TO 70 UNITS DAILY VIA PUMP-  Dx E10.9, # 90 mL, 3 Refill(s), Type: Maintenance, Pharmacy: [facility], INJECT UP TO 70 UNITS DAILY VIA PUMP-  Dx E10.9, 67, in, 12/01/20 8:34:00 CST, Height Measured, 191.4, lb...  ULTRA-FINE III SHORT PEN NEEDL MG INJECTABLE: ULTRA-FINE III SHORT PEN NEEDL MG INJECTABLE, See Instructions, Instructions: injects 4 times daily, # 400 EA, 11 Refill(s), Pharmacy: [facility] [id number]  aspirin 81 mg oral tablet: 1 tab(s) ( 81 mg ), PO, Daily, # 30 tab(s), 0 Refill(s), Type: Maintenance, OTC (Rx)  levothyroxine 175 mcg (0.175 mg) oral tablet: = 1 tab(s) ( 175 mcg ), Oral, daily, # 90 tab(s), 3 Refill(s), Type: Maintenance, Pharmacy: [facility], 1 tab(s) Oral daily, 67, in, 12/01/20 8:34:00 CST, Height  Measured, 191.4, lb, 12/01/20 8:34:00 CST, Weight Measured  pravastatin 20 mg oral tablet: See Instructions, Instructions: TAKE 1 TABLET BY MOUTH ONCE DAILY AT BEDTIME, # 90 unknown unit, 3 Refill(s), Pharmacy: [facility], TAKE 1 TABLET BY MOUTH ONCE DAILY AT BEDTIME, 67, in, 12/01/20 8:34:00 CST, Height Measured, 191.4, lb, 12/01/20 8:34...  Documented Medications  Documented  Metamucil: Oral, 0 Refill(s), Type: Maintenance,    Medications          *denotes recorded medication          ULTRA-FINE III SHORT PEN NEEDL MG INJECTABLE: See Instructions, injects 4 times daily, 400 EA.          CONTOUR NEXT TEST STRIP: See Instructions, TEST 4 TIMES A DAY, 400 unknown unit, 3 Refill(s).          CONTOUR NEXT TEST STRIPS: See Instructions, pt testing 4 times daily- pt has a pump and needing to test more often due to fluctuating BS's - Dx - Z96.41 and E10.9, 400 EA, 3 Refill(s).          Glucose tabs: See Instructions, use as needed for hypoglycemia, 100 EA.          Dexcom G6 : See Instructions, use to see glucose readings, 1 EA, 1 Refill(s).          Dexcom G6 sensors: See Instructions, change sensor every 10 days  E10.9, 9 EA, 11 Refill(s).          Dexcom G6 transmitter: See Instructions, change every 3 months, 1 EA, 3 Refill(s).          aspirin 81 mg oral tablet: 81 mg, 1 tab(s), PO, Daily, 30 tab(s).          NovoLOG 100 units/mL injectable solution: See Instructions, INJECT UP TO 70 UNITS DAILY VIA PUMP-  Dx E10.9, 90 mL, 3 Refill(s).          levothyroxine 175 mcg (0.175 mg) oral tablet: 175 mcg, 1 tab(s), Oral, daily, 90 tab(s), 3 Refill(s).          pravastatin 20 mg oral tablet: See Instructions, TAKE 1 TABLET BY MOUTH ONCE DAILY AT BEDTIME, 90 unknown unit, 3 Refill(s).          *Metamucil: Oral, 0 Refill(s).       Problem list:    All Problems  Diabetes mellitus type 1 / SNOMED CT 433413982 / Confirmed  Essential tremor / SNOMED CT 3770149668 / Confirmed  Hyperlipidemia / SNOMED CT 28071209 /  "Confirmed  Hypothyroid / SNOMED CT 25250415 / Confirmed  Insulin pump in place / SNOMED CT 0997548757 / Confirmed  Intolerance of drug / SNOMED CT 18190260 / Confirmed  Canceled: Long term (current) use of insulin / SNOMED CT 745236118      Histories   Past Medical History:    Active  Hyperlipidemia (69070847)  Hypothyroid (65837410)  Comments:  2010 CST 1:40 PM GARCIA - Kassi Link CMA  2007  Diabetes mellitus type 1 (647645013)  Comments:  2010 CST 1:41 PM GARCIA - Kassi Link CMA  2007  Essential tremor (2971266008)   Family History:    Diabetes mellitus type I  Father ()  Bowel obstruction  Father ()  CA - Cancer of uterus  Mother ()     Procedure history:    Rotator cuff repair ([id number]) on 5/3/2021 at 76 Years.  Comments:  2021 12:22 PM CDT - Marianna Villanueva  Left.  Colonoscopy (077040263) on 2019 at 74 Years.  Comments:  2020 8:52 AM CDT - Marianna Villanueva  Indication: Positive Cologuard.             Sedation: MAC.    2019 9:23 AM CDT - Malika Joya CMA  Tubular adenoma - negative for high grade dysplasia - Repeat in 3years  Screening for colon cancer (0769376900) on 2019 at 74 Years.  Comments:  6/3/2019 4:46 PM CDT - Yoanna Danielson MA  Cologuard result:  positive  Recommendation:  needs colonoscopy  Screening for malignant neoplasm of colon (8377136257) on 2015 at 70 Years.  Comments:  2016 8:15 AM CST - Adali Pantoja  Cologuard is negative  Sialogram ([id number]) on 2006 at 60 Years.  Comments:  3/29/2013 10:58 AM CDT - Yoanna Porras  \"Stone.  Swollen gland.\"  removal of stone, left mandibular gland in the month of 2005 at 60 Years.  Sigmoidoscopy (51528602) on 10/17/2002 at 57 Years.  Insulin pump, device (8863669449).   Social History:        Electronic Cigarette/Vaping Assessment            Electronic Cigarette Use: Never.      Alcohol Assessment            1-2 times per month, 1 drinks/episode average.      Tobacco " Assessment            Former smoker, quit more than 30 days ago, Cigarettes      Employment and Education Assessment            Retired, Work/School description: /, [employer].  Previous               employment/school: [employer].      Home and Environment Assessment            Marital status: .  Spouse/Partner name: Vicenta.      Exercise and Physical Activity Assessment            Exercise frequency: Daily.  Exercise type: Aerobics, Walking, Weight lifting.        Physical Examination   Vital Signs   8/19/2021 12:37 PM CDT Temperature Tympanic 97.6 DegF  LOW    Peripheral Pulse Rate 72 bpm    HR Method Electronic    Systolic Blood Pressure 146 mmHg  HI    Diastolic Blood Pressure 76 mmHg    Mean Arterial Pressure 99 mmHg    BP Site Right arm    BP Method Electronic    Oxygen Saturation 98 %      Measurements from flowsheet : Measurements   8/19/2021 12:37 PM CDT Height Measured - Standard 67 in    Height/Length Estimated 73 cm    Weight Measured - Standard 188.3 lb    BSA 2.01 m2    Body Mass Index 29.49 kg/m2  HI      General:  Alert and oriented, No acute distress.    Eye:  Extraocular movements are intact, Normal conjunctiva.    Airway:       Mallampati classification: I (soft palate, fauces, uvula, pillars visible).    HENT:  Normocephalic, Normal hearing, Oral mucosa is moist, No pharyngeal erythema.    Neck:  Supple, Non-tender, No carotid bruit, No lymphadenopathy, No thyromegaly.    Respiratory:  Lungs are clear to auscultation, Respirations are non-labored.    Cardiovascular:  Normal rate, Regular rhythm, No murmur, No gallop, No edema.    Gastrointestinal:  Soft, Non-tender, Non-distended,  insulin pump and CGM.    Musculoskeletal:  Normal gait.    Neurologic:  No focal deficits.    Cognition and Speech:  Oriented, Speech clear and coherent, Functional cognition intact.    Psychiatric:  Cooperative, Appropriate mood & affect.        Review / Management   Results review:  Lab results   4/23/2021 12:21 PM CDT Sodium Level 137 mmol/L    Potassium Level 4.2 mmol/L    Chloride Level 102 mmol/L    CO2 Level 29 mmol/L    Glucose Level 159 mg/dL  HI    BUN 18 mg/dL    Creatinine Level 0.94 mg/dL    BUN/Creat Ratio NOT APPLICABLE    eGFR 79 mL/min/1.73m2    eGFR African American 92 mL/min/1.73m2    Calcium Level 9.4 mg/dL    Hgb A1c 8.5  HI    TSH 1.21 mIU/L    U Creatinine 97 mg/dL    U Microalbumin 0.6 mg/dL    Ur Microalbumin/Creatinine Ratio 6    WBC 9.4    RBC 4.60    Hgb 15.4 gm/dL    Hct 44.8 %    MCV 97.4 fL    MCH 33.5 pg  HI    MCHC 34.4 gm/dL    RDW 13.7 %    Platelet 236    MPV 10.2 fL   .    ECG interpretation:  Time  4/23/2021 1:04:00 PM, Rate  66  beats per minute, Borderline IVCD with   ms, NSSTT change.       Impression and Plan   Diagnosis     Diabetes mellitus type 1 (IGT46-QK E10.9).     Essential tremor (YZJ79-SM G25.0).     Insulin pump in place (WYC16-SL Z96.41).     Hypothyroid (HON42-VI E03.9).     Hyperlipidemia (LDL57-TK E78.5).     Rotator cuff tear arthropathy of left shoulder (RNF50-IF M75.102).     Preop exam for internal medicine (NUL50-KA Z01.818).     Summary:  Stable for low risk surgery. No cardiopulmonary disease. Revised Cardiac Risk index Class II for insulin treatment..    Orders     Orders (Selected)   Outpatient Orders  Ordered (In Transit)  Basic Metabolic Panel* ([facility]): Specimen Type: Serum, Collection Date: 08/19/21 12:56:00 CDT  Hemoglobin A1c* ([facility]): Specimen Type: Blood, Collection Date: 08/19/21 12:56:00 CDT.     Continue insulin pump with monitoring during surgery.       ICR  12:00      6  adding 3:00am  5  15:00     6.5 changing to 6     Sensitivity  0:00      37  adding 20:00  40    Target   120 - 120     Basal:   20.488 decreased slightly to 20.425  MN   0.600 0.575  3am   0.675      6:30am  0.95   13:00 0.95   18:00 0.95    22:00  0.80      Insulin action  3.5 hours  Max Bolus    17u  Max  Basal   1u/ hr     30 day Average sensor glucose 184 mg/dL (98.3% CGM active)     Time in target range 70 - 180  56.3% (goal 70%+)  High 181 - 250    42.7%  Very High >250    20.9%  Low 54 - 69     1.0%  Very low <54     0.0%    Basal 41%  Bolus 59%    Total daily dose 49.4u +/- 7.8u

## 2022-02-15 NOTE — PROGRESS NOTES
Patient:   ROSALIO ANDRADE            MRN: [id number]            FIN: [id number]               Age:   75 years     Sex:  Male     :  1945   Associated Diagnoses:   Traumatic injury of left shoulder   Author:   Evy Orozco MD      Chief Complaint   3/31/2021 10:40 AM CDT   L shoulder injury post fall 3 weeks ago in [place]. Needs PT order.      History of Present Illness   patient with left shoulder pain since fall 3 weeks ago  was out for a walk and tripped, falling onto outstretched left hand  denies any bony pain  primarily notices symptoms in anterior shoulder, worse with lifting arm in front of body  no deformity  would like to try physical therapy      Health Status   Allergies:    Allergic Reactions (All)  No known allergies  Canceled/Inactive Reactions (All)  Severity Not Documented  Atorvastatin (Joint pain)   Medications:  (Selected)   Prescriptions  Prescribed  CONTOUR NEXT TEST STRIP: See Instructions, Instructions: TEST 4 TIMES A DAY, # 400 unknown unit, 3 Refill(s), Type: Maintenance, Pharmacy: "[facility] STORE [id number], TEST 4 TIMES A DAY  CONTOUR NEXT TEST STRIPS: CONTOUR NEXT TEST STRIPS, See Instructions, Instructions: pt testing 4 times daily- pt has a pump and needing to test more often due to fluctuating BS's - Dx - Z96.41 and E10.9, Supply, # 400 EA, 3 Refill(s), Type: Maintenance, Pharmacy: [facility],...  Dexcom G6 : Dexcom G6 , See Instructions, Instructions: use to see glucose readings, Supply, # 1 EA, 1 Refill(s), Type: Maintenance, Pharmacy: [facility], use to see glucose readings, 68.5, in, 20 10:12:00 CDT, Height Measured, 186.6, lb, 20...  Dexcom G6 sensors: Dexcom G6 sensors, See Instructions, Instructions: change sensor every 10 days  E10.9, Supply, # 9 EA, 11 Refill(s), Type: Maintenance, Pharmacy: [facility], change sensor every 10 days  E10.9, 67, in, 20 8:34:00 CST, Height Measured, 191.4, l...  Dexcom G6 transmitter: Dexcom G6  transmitter, See Instructions, Instructions: change every 3 months, Supply, # 1 EA, 3 Refill(s), Type: Maintenance, Pharmacy: [facility], change every 3 months, 68.5, in, 07/09/20 10:12:00 CDT, Height Measured, 186.6, lb, 07/09/20 10:12:00...  Glucagon Emergency Kit for Low Blood Sugar 1 mg injection: See Instructions, Instructions: 1mg injection if pt unable to safely consume glucose, # 1 EA, 0 Refill(s), Type: Maintenance, Pharmacy: [facility] [id number] [facility], keep on hold, 1mg injection if pt unable to safely consume glucose  Glucose tabs: Glucose tabs, See Instructions, Instructions: use as needed for hypoglycemia, # 100 EA, 3 Refill(s), Type: Maintenance, Pharmacy: [facility]  NovoLOG 100 units/mL injectable solution: See Instructions, Instructions: INJECT UP TO 70 UNITS DAILY VIA PUMP-  Dx E10.9, # 90 mL, 3 Refill(s), Type: Maintenance, Pharmacy: [facility], INJECT UP TO 70 UNITS DAILY VIA PUMP-  Dx E10.9, 67, in, 12/01/20 8:34:00 CST, Height Measured, 191.4, lb...  ULTRA-FINE III SHORT PEN NEEDL MG INJECTABLE: ULTRA-FINE III SHORT PEN NEEDL MG INJECTABLE, See Instructions, Instructions: injects 4 times daily, # 400 EA, 11 Refill(s), Pharmacy: [id number]  aspirin 81 mg oral tablet: 1 tab(s) ( 81 mg ), PO, Daily, # 30 tab(s), 0 Refill(s), Type: Maintenance, OTC (Rx)  levothyroxine 175 mcg (0.175 mg) oral tablet: = 1 tab(s) ( 175 mcg ), Oral, daily, # 90 tab(s), 3 Refill(s), Type: Maintenance, Pharmacy: [facility], 1 tab(s) Oral daily, 67, in, 12/01/20 8:34:00 CST, Height Measured, 191.4, lb, 12/01/20 8:34:00 CST, Weight Measured  pravastatin 20 mg oral tablet: See Instructions, Instructions: TAKE 1 TABLET BY MOUTH ONCE DAILY AT BEDTIME, # 90 unknown unit, 3 Refill(s), Pharmacy: [facility], TAKE 1 TABLET BY MOUTH ONCE DAILY AT BEDTIME, 67, in, 12/01/20 8:34:00 CST, Height Measured, 191.4, lb, 12/01/20 8:34...  Documented Medications  Documented  Metamucil: Oral, 0 Refill(s), Type: Maintenance  "  Problem list:    All Problems (Selected)  Intolerance of drug / SNOMED CT 08912893 / Confirmed  Essential tremor / SNOMED CT 2417242226 / Confirmed  Hyperlipidemia / SNOMED CT 03540182 / Confirmed  Hypothyroid / SNOMED CT 84537835 / Confirmed  2007  Insulin pump in place / SNOMED CT 4793702367 / Confirmed  Diabetes mellitus type 1 / SNOMED CT 529762445 / Confirmed  2007      Histories   Past Medical History:    Active  Hyperlipidemia (SNOMED CT 10323231)  Hypothyroid (SNOMED CT 38308463)  Comments:  2010 CST 1:40 PM CST - Kassi Link CMA  2007  Diabetes mellitus type 1 (SNOMED CT 509108562)  Comments:  2010 CST 1:41 PM GARCIA - Kassi Link CMA  2007  Essential tremor (SNOMED CT 2163368712)   Family History:    Diabetes mellitus type I  Father ()  Bowel obstruction  Father ()  CA - Cancer of uterus  Mother ()     Procedure history:    Colonoscopy ([id number]) on 2019 at 74 Years.  Comments:  2020 8:52 AM CDT - Marianna Villanueva  Indication: Positive Cologuard.             Sedation: MAC.    2019 9:23 AM CDT - Malika Joya CMA  Tubular adenoma - negative for high grade dysplasia - Repeat in 3years  Screening for colon cancer ([id number]) on 2019 at 74 Years.  Comments:  6/3/2019 4:46 PM CDT - Yoanna Danielson MA  Cologuard result:  positive  Recommendation:  needs colonoscopy  Screening for malignant neoplasm of colon ([id number]) on 2015 at 70 Years.  Comments:  2016 8:15 AM CST - Adali Pantoja  Cologuard is negative  Sialogram ([id number]) on 2006 at 60 Years.  Comments:  3/29/2013 10:58 AM CDT - Yoanna Porras  \"Stone.  Swollen gland.\"  removal of stone, left mandibular gland in the month of 2005 at 60 Years.  Sigmoidoscopy ([id number]) on 10/17/2002 at 57 Years.  Insulin pump, device ([id number]).      Physical Examination   Vital Signs   3/31/2021 11:00 AM CDT Systolic Blood Pressure 136 mmHg  HI    Diastolic Blood Pressure 72 mmHg    Mean " Arterial Pressure 93 mmHg   3/31/2021 10:40 AM CDT Temperature Tympanic 96.4 DegF  LOW    Peripheral Pulse Rate 86 bpm    Systolic Blood Pressure 154 mmHg  HI    Diastolic Blood Pressure 81 mmHg  HI    Mean Arterial Pressure 105 mmHg    BP Site Right arm    BP Method Electronic    Oxygen Saturation 100 %      Measurements from flowsheet : Measurements   3/31/2021 11:00 AM CDT Height Measured - Standard 67 in    Height/Length Estimated 73 cm   3/31/2021 10:40 AM CDT Height Measured - Standard 67 in    Height/Length Estimated 73 cm    Weight Measured - Standard 188 lb    BSA 2.01 m2    Body Mass Index 29.44 kg/m2  HI      General:  Alert and oriented, No acute distress.    Musculoskeletal:  pain with lifting above 90 degrees anteiorly, lateral abduction with better ROM, no deformity, normal internal rotation, no bony tenderness.       Impression and Plan   Diagnosis     Traumatic injury of left shoulder ([id number] S49.92XA).     Course:  patient will start physical therapy, follow up if not resolving.    Orders     Orders (Selected)   Outpatient Orders  Ordered  Referral (Request): 03/31/21 10:58:00 CDT, Referred to: Physical Therapy, Referred to: eval and treat for left shoulder pain, Traumatic injury of left shoulder.

## 2022-02-15 NOTE — NURSING NOTE
Comprehensive Intake Entered On:  3/31/2021 10:44 AM CDT    Performed On:  3/31/2021 10:40 AM CDT by Trini Michaels CMA               Summary   Chief Complaint :   L shoulder injury post fall 3 weeks ago in [place]. Needs PT order.    Weight Measured :   188 lb(Converted to: 188 lb 0 oz, 85.275 kg)    Height Measured :   67 in(Converted to: 5 ft 7 in, 170.18 cm)    Body Mass Index :   29.44 kg/m2 (HI)    Body Surface Area :   2.01 m2   Height/Length Estimated :   73 cm(Converted to: 2 ft 5 in, 28.74 in)    Systolic Blood Pressure :   154 mmHg (HI)    Diastolic Blood Pressure :   81 mmHg (HI)    Mean Arterial Pressure :   105 mmHg   Peripheral Pulse Rate :   86 bpm   BP Site :   Right arm   BP Method :   Electronic   Temperature Tympanic :   96.4 DegF(Converted to: 35.8 DegC)  (LOW)    Oxygen Saturation :   100 %   Trini Michaels CMA - 3/31/2021 10:40 AM CDT   Health Status   Allergies Verified? :   Yes   Medication History Verified? :   Yes   Pre-Visit Planning Status :   Completed   Tobacco Use? :   Former smoker   Trini Michaels CMA - 3/31/2021 10:40 AM CDT   Consents   Consent for Immunization Exchange :   Consent Granted   Consent for Immunizations to Providers :   Consent Granted   Trini Michaels CMA - 3/31/2021 10:40 AM CDT   Meds / Allergies   (As Of: 3/31/2021 10:44:24 AM CDT)   Allergies (Active)   No known allergies  Estimated Onset Date:   Unspecified ; Created By:   Mary Leung CMA; Reaction Status:   Active ; Category:   Drug ; Substance:   No known allergies ; Type:   Allergy ; Updated By:   Mary Leung CMA; Reviewed Date:   3/31/2021 10:42 AM CDT        Medication List   (As Of: 3/31/2021 10:44:24 AM CDT)   Prescription/Discharge Order    Miscellaneous Rx Supply  :   Miscellaneous Rx Supply ; Status:   Prescribed ; Ordered As Mnemonic:   Glucose tabs ; Simple Display Line:   See Instructions, use as needed for hypoglycemia, 100 EA ; Ordering Provider:   Zachery Koehler MD; Catalog Code:   Miscellaneous Rx  Supply ; Order Dt/Tm:   3/29/2011 9:39:13 AM CDT          aspirin  :   aspirin ; Status:   Prescribed ; Ordered As Mnemonic:   aspirin 81 mg oral tablet ; Simple Display Line:   81 mg, 1 tab(s), PO, Daily, 30 tab(s) ; Ordering Provider:   Zachery Koehler MD; Catalog Code:   aspirin ; Order Dt/Tm:   3/2/2012 11:59:54 AM CST          Miscellaneous Prescription  :   Miscellaneous Prescription ; Status:   Prescribed ; Ordered As Mnemonic:   ULTRA-FINE III SHORT PEN NEEDL MG INJECTABLE ; Simple Display Line:   See Instructions, injects 4 times daily, 400 EA ; Ordering Provider:   Zachery Koehler MD; Catalog Code:   Miscellaneous Prescription ; Order Dt/Tm:   6/5/2012 4:48:13 PM CDT          glucagon  :   glucagon ; Status:   Prescribed ; Ordered As Mnemonic:   Glucagon Emergency Kit for Low Blood Sugar 1 mg injection ; Simple Display Line:   See Instructions, 1mg injection if pt unable to safely consume glucose, 1 EA, 0 Refill(s) ; Ordering Provider:   Kb Mcqueen MD; Catalog Code:   glucagon ; Order Dt/Tm:   5/3/2019 10:50:31 AM CDT          Miscellaneous Prescription  :   Miscellaneous Prescription ; Status:   Prescribed ; Ordered As Mnemonic:   CONTOUR NEXT TEST STRIP ; Simple Display Line:   See Instructions, TEST 4 TIMES A DAY, 400 unknown unit, 3 Refill(s) ; Ordering Provider:   Kb Mcqueen MD; Catalog Code:   Miscellaneous Prescription ; Order Dt/Tm:   9/17/2019 11:41:58 AM CDT          Miscellaneous Prescription  :   Miscellaneous Prescription ; Status:   Prescribed ; Ordered As Mnemonic:   CONTOUR NEXT TEST STRIPS ; Simple Display Line:   See Instructions, pt testing 4 times daily- pt has a pump and needing to test more often due to fluctuating BS's - Dx - Z96.41 and E10.9, 400 EA, 3 Refill(s) ; Ordering Provider:   bK Mcqueen MD; Catalog Code:   Miscellaneous Prescription ; Order Dt/Tm:   9/26/2019 11:48:12 AM CDT          Miscellaneous Rx Supply  :   Miscellaneous Rx Supply ; Status:   Prescribed ; Ordered As  Mnemonic:   Dexcom G6 transmitter ; Simple Display Line:   See Instructions, change every 3 months, 1 EA, 3 Refill(s) ; Ordering Provider:   Kb Mcqueen MD; Catalog Code:   Miscellaneous Rx Supply ; Order Dt/Tm:   9/2/2020 4:20:17 PM CDT          Miscellaneous Rx Supply  :   Miscellaneous Rx Supply ; Status:   Prescribed ; Ordered As Mnemonic:   Dexcom G6  ; Simple Display Line:   See Instructions, use to see glucose readings, 1 EA, 1 Refill(s) ; Ordering Provider:   Kb Mcqueen MD; Catalog Code:   Miscellaneous Rx Supply ; Order Dt/Tm:   9/2/2020 4:21:09 PM CDT          levothyroxine  :   levothyroxine ; Status:   Prescribed ; Ordered As Mnemonic:   levothyroxine 175 mcg (0.175 mg) oral tablet ; Simple Display Line:   175 mcg, 1 tab(s), Oral, daily, 90 tab(s), 3 Refill(s) ; Ordering Provider:   Kb Mcqueen MD; Catalog Code:   levothyroxine ; Order Dt/Tm:   12/1/2020 8:50:19 AM CST          insulin aspart  :   insulin aspart ; Status:   Prescribed ; Ordered As Mnemonic:   NovoLOG 100 units/mL injectable solution ; Simple Display Line:   See Instructions, INJECT UP TO 70 UNITS DAILY VIA PUMP-  Dx E10.9, 90 mL, 3 Refill(s) ; Ordering Provider:   Kb Mcqueen MD; Catalog Code:   insulin aspart ; Order Dt/Tm:   12/1/2020 9:43:57 AM CST          Miscellaneous Rx Supply  :   Miscellaneous Rx Supply ; Status:   Prescribed ; Ordered As Mnemonic:   Dexcom G6 sensors ; Simple Display Line:   See Instructions, change sensor every 10 days  E10.9, 9 EA, 11 Refill(s) ; Ordering Provider:   Kb Mcqueen MD; Catalog Code:   Miscellaneous Rx Supply ; Order Dt/Tm:   12/1/2020 9:43:58 AM CST          pravastatin  :   pravastatin ; Status:   Prescribed ; Ordered As Mnemonic:   pravastatin 20 mg oral tablet ; Simple Display Line:   See Instructions, TAKE 1 TABLET BY MOUTH ONCE DAILY AT BEDTIME, 90 unknown unit, 3 Refill(s) ; Ordering Provider:   Kb Mcqueen MD; Catalog Code:   pravastatin ; Order Dt/Tm:   12/1/2020 9:43:58 AM  CST            Home Meds    psyllium  :   psyllium ; Status:   Documented ; Ordered As Mnemonic:   Metamucil ; Simple Display Line:   Oral, 0 Refill(s) ; Catalog Code:   psyllium ; Order Dt/Tm:   5/19/2020 8:11:08 AM CDT            ID Risk Screen   Recent Travel History :   Last travel within 21 days   Family Member Travel History :   No recent travel   Other Exposure to Infectious Disease :   Unknown   COVID-19 Testing Status :   No positive COVID-19 test   Trini Michaels CMA - 3/31/2021 10:40 AM CDT

## 2022-02-15 NOTE — PROGRESS NOTES
Patient:   ROSALIO ANDRADE            MRN: [id number]            FIN: [id number]               Age:   76 years     Sex:  Male     :  1945   Associated Diagnoses:   Diabetes mellitus type 1; Hyperlipidemia NOS; Overweight (BMI 25.0-29.9)   Author:   Rupal White      Visit Information   Visit type:  Medical Nutrition Therapy for Diabetes Management .    Referral source:  Richar RIZZO, Kb.       Chief Complaint   DM Type 1 (ROLANDO), Hyperlipidemia, Overweight       History of Present Illness   Discussed nutrition, diabetes, and lifestyle management with pt.  Pt started Medtronic 630G pump on 17. Pt started Dexcom G6 CGMS 2020.  Pt will hopefully qualify for the Tandem with Control IQ 2022    a1c 7.6% = 171 eAG; meeting pt's personal target of <8% and higher than ADA goal of <7%.    Pt really likes the Dexcom G6, benefits from the low alarms and states he can treat a low when the alarm goes off   Nutrition: variable intake will often have large carb loads, wide variety of foods with adequate lean protein, fruit and vegetables  Physical Activity: significantly decreased, left shoulder rotator cuff repair due to a fall, surgery completed 5/3/2021, recovery with range of motion unsuccessful required a second surgery still going through PT     taking a daily walk   Stress:   moderate, going to [place] for the winter months   Sleep: good  Support: wife  Insulin:  Novolog via pump    BG Testing: ~ 4+x/ day if no sensor available otherwise uses sensor   Routine diabetes care:  Patient has mild background 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 - exam 2021 report in chart, dental routine q 6+ mo 21, and foot exams UTD- no concerns    ICR  12:00     6  5:00am     5.5 changing to 4.5  15:00     6     Sensitivity  0:00      40    Target   125 - 125     Basal:   19.9 decreased slightly to 19.55  MN   0.575  3am   0.650    6:30am  0.925  13:00 0.95 changed to 0.900  22:00  0.775   0.750    Insulin action  3.5 hours  Max Bolus     17u  Max Basal   1u/ hr     30 day Average sensor glucose 173 mg/dL (99.1% CGM active)     Time in target range 70 - 180  59% (goal 70%+)  High 181 - 250    39% higher   Very High >250    16.8%  higher   Low 54 - 69     2.0%  Very low <54     <1%    Basal 46%  Bolus 54%    Total daily dose 44u          Health Status   Allergies:    Allergic Reactions (Selected)  No known allergies   Medications:  (Selected)   Prescriptions  Prescribed  Dexcom G6 : Dexcom G6 , See Instructions, Instructions: use to see glucose readings, Supply, # 1 EA, 1 Refill(s), Type: Maintenance  Dexcom G6 sensors: Dexcom G6 sensors, See Instructions, Instructions: change sensor every 10 days  E10.9, Supply, # 9 EA, 11 Refill(s), Type: Maintenance  Dexcom G6 transmitter: Dexcom G6 transmitter, See Instructions, Instructions: change every 3 months, Supply, # 1 EA, 3 Refill(s), Type: Maintenance, Pharmacy: [facility], change every 3 months, 67, in, 11/01/21 11:40:00 CDT, Height Measured, 186, lb, 11/01/21 11:40:00 CDT...  Glucose tabs: Glucose tabs, See Instructions, Instructions: use as needed for hypoglycemia, # 100 EA, 3 Refill(s), Type: Maintenance, Pharmacy: [facility]  NovoLOG 100 units/mL injectable solution: See Instructions, Instructions: INJECT UP TO 70 UNITS DAILY VIA PUMP-  Dx E10.9, # 90 mL, 3 Refill(s), Type: Maintenance, Pharmacy: [facility], INJECT UP TO 70 UNITS DAILY VIA PUMP-  Dx E10.9, 67, in, 11/23/21 9:33:00 CST, Height Measured, 191.5, lb...  aspirin 81 mg oral tablet: 1 tab(s) ( 81 mg ), PO, Daily, # 30 tab(s), 0 Refill(s), Type: Maintenance, OTC (Rx)  levothyroxine 175 mcg (0.175 mg) oral tablet: = 1 tab(s), Oral, daily, # 90 tab(s), 3 Refill(s), Type: Maintenance, Pharmacy: [facility], due for visit next month, 1 tab(s) Oral daily, 67, in, 11/23/21 9:33:00 CST, Height Measured, 191.5, lb, 11/23/21 9:33:00 CST, Weight Measured  losartan 50 mg oral tablet: = 1 tab(s) ( 50 mg ), Oral, daily, # 90  tab(s), 3 Refill(s), Type: Maintenance, Pharmacy: [facility], 1 tab(s) Oral daily, 67, in, 11/23/21 9:33:00 CST, Height Measured, 191.5, lb, 11/23/21 9:33:00 CST, Weight Measured  pravastatin 20 mg oral tablet: = 1 tab(s), Oral, qhs, # 90 tab(s), 3 Refill(s), Type: Maintenance, Pharmacy: [facility], due for a visit next month, 1 tab(s) Oral qhs, 67, in, 11/23/21 9:33:00 CST, Height Measured, 191.5, lb, 11/23/21 9:33:00 CST, Weight Measured  Documented Medications  Documented  Metamucil: Oral, 0 Refill(s), Type: Maintenance,    Medications          *denotes recorded medication          Glucose tabs: See Instructions, use as needed for hypoglycemia, 100 EA.          Dexcom G6 : See Instructions, use to see glucose readings, 1 EA, 1 Refill(s).          Dexcom G6 sensors: See Instructions, change sensor every 10 days  E10.9, 9 EA, 11 Refill(s).          Dexcom G6 transmitter: See Instructions, change every 3 months, 1 EA, 3 Refill(s).          aspirin 81 mg oral tablet: 81 mg, 1 tab(s), PO, Daily, 30 tab(s).          NovoLOG 100 units/mL injectable solution: See Instructions, INJECT UP TO 70 UNITS DAILY VIA PUMP-  Dx E10.9, 90 mL, 3 Refill(s).          levothyroxine 175 mcg (0.175 mg) oral tablet: 1 tab(s), Oral, daily, 90 tab(s), 3 Refill(s).          losartan 50 mg oral tablet: 50 mg, 1 tab(s), Oral, daily, 90 tab(s), 3 Refill(s).          pravastatin 20 mg oral tablet: 1 tab(s), Oral, qhs, 90 tab(s), 3 Refill(s).          *Metamucil: Oral, 0 Refill(s).       Problem list:    All Problems  Diabetes mellitus type 1 / SNOMED CT 257510474 / Confirmed  Insulin pump in place / SNOMED CT 2294235091 / Confirmed  Hypothyroid / SNOMED CT 05752150 / Confirmed  Hyperlipidemia / SNOMED CT 76802973 / Confirmed  Essential tremor / SNOMED CT 9330012420 / Confirmed  Intolerance of drug / SNOMED CT 43834308 / Confirmed  Canceled: Long term (current) use of insulin / SNOMED CT 666490119      Histories   Past Medical  "History:    Active  Hyperlipidemia (15165419)  Hypothyroid (87659887)  Comments:  2010 CST 1:40 PM GARCIA - Kassi Link CMA    Diabetes mellitus type 1 (050113984)  Comments:  2010 CST 1:41 PM Kassi Gonzlaez CMA    Essential tremor (4006017166)   Family History:    Diabetes mellitus type I  Father ()  Bowel obstruction  Father ()  CA - Cancer of uterus  Mother ()     Procedure history:    Repair of rotator cuff of shoulder (SNOMED CT 7005135760) performed by John Paul DO on 2021 at 76 Years.  Comments:  2021 10:50 AM CDT - Marianna Villanueva, after injury when reaching for an object.    2021 10:47 AM MAURICET Marianna Craig.  Rotator cuff repair (SNOMED CT 597460131) performed by John Paul DO on 5/3/2021 at 76 Years.  Comments:  2021 12:22 PM MAURICET Marianna Craig.  Colonoscopy (SNOMED CT 234437356) performed by Andreas Dawson MDret on 2019 at 74 Years.  Comments:  2020 8:52 AM CDT Marianna Craig  Indication: Positive Cologuard.             Sedation: MAC.    2019 9:23 AM CDT - Malika Joya CMA  Tubular adenoma - negative for high grade dysplasia - Repeat in 3years  Screening for colon cancer (SNOMED CT 3681990861) performed by Kb Mcqueen MD on 2019 at 74 Years.  Comments:  6/3/2019 4:46 PM CDT - Yoanna Danielson MA  Cologuard result:  positive  Recommendation:  needs colonoscopy  Screening for malignant neoplasm of colon (SNOMED CT 6071410465) on 2015 at 70 Years.  Comments:  2016 8:15 AM GARCIA - Adali Pantjoa  Cologuard is negative  Sialogram (SNOMED CT 696208780) on 2006 at 60 Years.  Comments:  3/29/2013 10:58 AM CDT - Yoanna Porras  \"Stone.  Swollen gland.\"  removal of stone, left mandibular gland in the month of 2005 at 60 Years.  Sigmoidoscopy (SNOMED CT 83430624) performed by Sky Bui MD on 10/17/2002 at 57 Years.  Insulin pump, device (SNOMED CT 7289942105). "   Social History:        Electronic Cigarette/Vaping Assessment            Electronic Cigarette Use: Never.      Alcohol Assessment            1-2 times per month, 1 drinks/episode average.      Tobacco Assessment            Former smoker, quit more than 30 days ago, Cigarettes      Employment and Education Assessment            Retired, Work/School description: /, [email or web address].  Previous               employment/school: [employer]      Home and Environment Assessment            Marital status: .  Spouse/Partner name: Vicenta.      Exercise and Physical Activity Assessment            Exercise frequency: Daily.  Exercise type: Aerobics, Walking, Weight lifting.        Physical Examination   Measurements from flowsheet : Measurements   12/21/2021 2:21 PM CST Height Measured - Standard 67 in    Height/Length Estimated 73 cm    Weight Measured - Standard 192 lb    BSA 2.03 m2    Body Mass Index 30.07 kg/m2  HI         Review / Management   Results review:  Lab results   11/16/2021 8:03 AM CST Hgb A1c 7.6  HI    Cholesterol 220 mg/dL  HI    Non-  HI    HDL 73 mg/dL    Chol/HDL Ratio 3.0      HI    Triglyceride 96 mg/dL    TSH 1.68 mIU/L   .       Impression and Plan   Diagnosis     Diabetes mellitus type 1 (OIU76-BK E10.9).     Hyperlipidemia NOS (OVJ42-MD E11.69).     Overweight (BMI 25.0-29.9) (GNR57-BF E66.3).       Counseled: Patient, Further instruction on AADE7 Self Care Behaviors  Discussed living well with diabetes, further information about diabetes disease process, reviewed chronic condition and appropriate treatment and self management  Healthy Eating - Large focus on accuracy of carbohydrate counting and decreasing carb load at meals/ dispersing them throughout the day - pt admits he likes to eat large portions.  Reviewed one way of trying to reduce glycemic variability through following a carbohydrate controlled meal plan - pt  will continue to work on this.  Additional meal planning ideas to incorporate dietary recommendations,  Education review on decreasing cardiovascular risk with following Therapeutic Lifestyle Changes (TLC) nutrition plan for heart healthy eating and healthy eating, discussed plant based meals.  Healthy holiday eating and healthy choices when eating out   Being Active - Education on how exercise helps with : will resume as able continue with PT    - lowering BG by increasing the muscles ability to take up and use glucose   - weight loss   - healthier heart (improve lipid profile)   - improve sleep, mood, energy   - decrease stress      Monitoring - Continue with Dexcom G6 and use BG testing as needed  Patient continues to meet criteria for CGM coverage;   - patient has type one diabetes mellitus; and  - patient has been using CGM daily; and  - patient is insulin treated with a continues infusion of insulin via pump; and  - patient is frequently adjusting insulin on the basis of CGM readings  - within six months prior to ordering the CGM, the patient has had an in-person visit with the practitioner; and determined that criteria (1-4) above are met; and   - every six months following the initial prescription of the CGM, the treating practitioner has an in-person visit with the patient to assess adherence to their CGM regimen and diabetes treatment plan    Taking Medication - on NovoLog via pump   Problem Solving -  Insulin pump back up plan handout filled out and provided for pt.  Pt has Lantus pen as a back up.  Reviewed Pump settings (basal/bolus), menu options and use, bolus wizard suspend, temp basal BG testing recommendations, hypo/hyperglycemia guidelines, infusion set changes and fill and DKA prevention.  Wear sensor and change every 10 days   medical support team assistance, resources provided (written); good control of stress, pump adjustments made today slightly but elevated reading and low readings likely  related to user recommended routine changes.  Bolus early 20+ min prior to meals, test BG prior to ALL meals, bolus for ALL snacks >5gm CHO, use temp basal for exercise  Healthy Coping - support of friends, family, and medical support team   Reducing Risks - Detailed education on potential complications associated with uncontrolled diabetes and prevention recommendations.  Recommendations include: annual eye exam, good oral cares with brushing BID and flossing daily, routine dental apts. good foot care tips and shoe wear - discussed monofilament test yearly.  Importance of adequate sleep and good control of BG to prevent developing complications related to uncontrolled DM including nephropathy, neuropathy, retinopathy, and cardiovascular disease.  Weight loss goal of 7 % of current body weight pounds as a reasonable goal to help increase insulin sensitivity      Pt able to verbalize understanding of above education, handouts provided for additional resources.       Goals:   1.  Practice healthy stress management and get good quality sleep with the goal of 7-8 hours per night.    2.   Physical activity: Recommend 30 min of physical activity on 5 or more days/ week.    3.  Eat in a healthy way, per diabetic plate method.  Nutrition reference: Eat 3 meals a day; snacks are optional.  A meal is three or more food groups; make it colorful for better nutrition.    4. Count carbohydrates and use bolus wizard for all meals and snacks bolus 15-20min prior to eating   5.  Use sensor and if not available BG testing 4+x/ day  Goal pre-meals 75 - 120; 2 hrs after meals 75 - 140   6.  Be aware of s/sx of hypo/ hyperglycemia and follow treatment protocol   7.  Always have back up pump supplies on hand and vial/ syringe with battery and glucose tablets   8. Follow up in 4 mo   9.  A1c <7%  Time in range 80 - 180 >70%      Professional Services   Time spent with pt 60 min   cc Dr. Kb Mcqueen

## 2022-02-15 NOTE — PROGRESS NOTES
Patient:   ROSALIO ANDRADE            MRN: [id number]            FIN: [id number]               Age:   75 years     Sex:  Male     :  1945   Associated Diagnoses:   Diabetes mellitus type 1; Hyperlipidemia NOS; Overweight (BMI 25.0-29.9)   Author:   Rupal White      Visit Information   Visit type:  Diabetes Self Management Education .    Accompanied by:  Spouse.    Referral source:  Richar RIZZO, Kb.       Chief Complaint   Uncontrolled DM Type 1 (ROLANDO), Hyperlipidemia, Overweight       History of Present Illness   Discussed nutrition, diabetes, and lifestyle management with pt.  Pt started Medtronic 630G pump on 17. Pt is here today to start the Dexcom G6 CGMS    a1c 8.4% = 195 eAG above pt's personal target of <8% and higher than ADA goal of <7%.    Pt has recently had more variable readings with lows and high's - settings changed 2020 with some improvement since then   Nutrition:  does not follow a low carb plan, enjoys a wide range of food  Physical Activity:  [place] [facility] [place] [facility], swimming and weights   Stress:   moderate  Sleep: good  Support: wife  Insulin:  Novolog via pump    BG Testing: ~ 4+x/ day   Routine diabetes care:  up to date with eye (2020), dental, and foot exams - no concerns    ICR  12:00     7.5   15:00     7.5     Sensitivity  0:00  40        Target   120 - 120     Basal:   18.20u/ day   MN   0.550    3am   0.655    6:30am  0.850    13:00 0.850    22:00  0.550    Insulin action  3.5 hours  Max Bolus    17u  Max Basal   1u/ hr       Health Status   Allergies:    Nonallergic Reactions (Selected)  Severity Not Documented  Atorvastatin (Joint pain)   Medications:  (Selected)   Prescriptions  Prescribed  CONTOUR NEXT TEST STRIP: See Instructions, Instructions: TEST 4 TIMES A DAY, # 400 unknown unit, 3 Refill(s), Type: Maintenance, Pharmacy: [facility] [id number] [facility], TEST 4 TIMES A DAY  CONTOUR NEXT TEST STRIPS: CONTOUR NEXT TEST STRIPS, See Instructions,  Instructions: pt testing 4 times daily- pt has a pump and needing to test more often due to fluctuating BS's - Dx - Z96.41 and E10.9, Supply, # 400 EA, 3 Refill(s), Type: Maintenance, Pharmacy: [facility],...  Dexcom G6 : Dexcom G6 , See Instructions, Instructions: use to see glucose readings, Supply, # 1 EA, 1 Refill(s), Type: Maintenance, Pharmacy: [facility], use to see glucose readings, 68.5, in, 07/09/20 10:12:00 CDT, Height Measured, 186.6, lb, 07/09/20...  Dexcom G6 sensors: Dexcom G6 sensors, See Instructions, Instructions: change sensor every 10 days, Supply, # 9 EA, 3 Refill(s), Type: Maintenance, Pharmacy: [facility], change sensor every 10 days, 68.5, in, 07/09/20 10:12:00 CDT, Height Measured, 186.6, lb, 07/09/20...  Dexcom G6 transmitter: Dexcom G6 transmitter, See Instructions, Instructions: change every 3 months, Supply, # 1 EA, 3 Refill(s), Type: Maintenance, Pharmacy: [facility], change every 3 months, 68.5, in, 07/09/20 10:12:00 CDT, Height Measured, 186.6, lb, 07/09/20 10:12:00...  Glucagon Emergency Kit for Low Blood Sugar 1 mg injection: See Instructions, Instructions: 1mg injection if pt unable to safely consume glucose, # 1 EA, 0 Refill(s), Type: Maintenance, Pharmacy: [facility] [id number] [facility], keep on hold, 1mg injection if pt unable to safely consume glucose  Glucose tabs: Glucose tabs, See Instructions, Instructions: use as needed for hypoglycemia, # 100 EA, 3 Refill(s), Type: Maintenance, Pharmacy: [facility]  NovoLOG 100 units/mL injectable solution: See Instructions, Instructions: INJECT UP TO 70 UNITS DAILY VIA PUMP-  Dx E10.9, # 90 mL, 3 Refill(s), Type: Maintenance, Pharmacy: [facility], INJECT UP TO 70 UNITS DAILY VIA PUMP-  Dx E10.9  ULTRA-FINE III SHORT PEN NEEDL MG INJECTABLE: ULTRA-FINE III SHORT PEN NEEDL MG INJECTABLE, See Instructions, Instructions: injects 4 times daily, # 400 EA, 11 Refill(s), Pharmacy: [facility] [id number]  aspirin 81 mg oral  tablet: 1 tab(s) ( 81 mg ), PO, Daily, # 30 tab(s), 0 Refill(s), Type: Maintenance, OTC (Rx)  levothyroxine 175 mcg (0.175 mg) oral tablet: = 1 tab(s) ( 175 mcg ), Oral, daily, # 90 tab(s), 1 Refill(s), Type: Maintenance, Pharmacy: [facility], 1 tab(s) Oral daily  pravastatin 20 mg oral tablet: = 1 tab(s) ( 20 mg ), Oral, daily, # 90 tab(s), 1 Refill(s), Type: Maintenance, Pharmacy: [facility], 1 tab(s) Oral daily  Documented Medications  Documented  Metamucil: Oral, 0 Refill(s), Type: Maintenance,    Medications          *denotes recorded medication          ULTRA-FINE III SHORT PEN NEEDL MG INJECTABLE: See Instructions, injects 4 times daily, 400 EA.          CONTOUR NEXT TEST STRIP: See Instructions, TEST 4 TIMES A DAY, 400 unknown unit, 3 Refill(s).          CONTOUR NEXT TEST STRIPS: See Instructions, pt testing 4 times daily- pt has a pump and needing to test more often due to fluctuating BS's - Dx - Z96.41 and E10.9, 400 EA, 3 Refill(s).          Glucose tabs: See Instructions, use as needed for hypoglycemia, 100 EA.          Dexcom G6 sensors: See Instructions, change sensor every 10 days, 9 EA, 3 Refill(s).          Dexcom G6 transmitter: See Instructions, change every 3 months, 1 EA, 3 Refill(s).          Dexcom G6 : See Instructions, use to see glucose readings, 1 EA, 1 Refill(s).          aspirin 81 mg oral tablet: 81 mg, 1 tab(s), PO, Daily, 30 tab(s).          Glucagon Emergency Kit for Low Blood Sugar 1 mg injection: See Instructions, 1mg injection if pt unable to safely consume glucose, 1 EA, 0 Refill(s).          NovoLOG 100 units/mL injectable solution: See Instructions, INJECT UP TO 70 UNITS DAILY VIA PUMP-  Dx E10.9, 90 mL, 3 Refill(s).          levothyroxine 175 mcg (0.175 mg) oral tablet: 175 mcg, 1 tab(s), Oral, daily, 90 tab(s), 1 Refill(s).          pravastatin 20 mg oral tablet: 20 mg, 1 tab(s), Oral, daily, 90 tab(s), 1 Refill(s).          *Metamucil: Oral, 0 Refill(s).      "  Problem list:    All Problems  Intolerance of drug / SNOMED CT 35470926 / Confirmed  Essential tremor / SNOMED CT 0881554929 / Confirmed  Hyperlipidemia / SNOMED CT 83301729 / Confirmed  Hypothyroid / SNOMED CT 61686339 / Confirmed  Insulin pump in place / SNOMED CT 5341489662 / Confirmed  Diabetes mellitus type 1 / SNOMED CT 176508245 / Confirmed  Canceled: Long term (current) use of insulin / SNOMED CT 732626018      Histories   Past Medical History:    Active  Hyperlipidemia (83987384)  Hypothyroid (03762252)  Comments:  2010 CST 1:40 PM CST - Kassi Link CMA  2007  Diabetes mellitus type 1 (667376097)  Comments:  2010 CST 1:41 PM GARCIA - Kassi Link CMA  2007  Essential tremor (2349471870)   Family History:    Diabetes mellitus type I  Father ()  Bowel obstruction  Father ()  [place] - Cancer of uterus  Mother ()     Procedure history:    Colonoscopy (SNOMED CT 963133766) performed by Mikhail Dawson MD on 2019 at 74 Years.  Comments:  2020 8:52 AM CDT - Marianna Villanueva  Indication: Positive Cologuard.             Sedation: MAC.    2019 9:23 AM CDT - Malika Joya CMA  Tubular adenoma - negative for high grade dysplasia - Repeat in 3years  Screening for colon cancer (SNOMED CT 0925610293) performed by Kb Mcqueen MD on 2019 at 74 Years.  Comments:  6/3/2019 4:46 PM CDT - Yoanna Danielson MA  Cologuard result:  positive  Recommendation:  needs colonoscopy  Screening for malignant neoplasm of colon (SNOMED CT 6277662796) on 2015 at 70 Years.  Comments:  2016 8:15 AM CST - Adali Pantoja  Cologuard is negative  Sialogram (SNOMED CT 882748995) on 2006 at 60 Years.  Comments:  3/29/2013 10:58 AM CDT - Yoanna Porras  \"Stone.  Swollen gland.\"  removal of stone, left mandibular gland in the month of 2005 at 60 Years.  Sigmoidoscopy (SNOMED CT 85386325) performed by Sky Bui MD on 10/17/2002 at 57 Years.  Insulin pump, device (SNOMED " CT 0257050485).   Social History:        Alcohol Assessment            1-2 times per month, 1 drinks/episode average.      Tobacco Assessment            Former smoker, quit more than 30 days ago, Cigarettes      Employment and Education Assessment            Retired, Work/School description: /, [employer].  Previous               employment/school: [employer]      Home and Environment Assessment            Marital status: .  Spouse/Partner name: Vicenta.      Exercise and Physical Activity Assessment            Exercise frequency: Daily.  Exercise type: Aerobics, Walking, Weight lifting.        Physical Examination   Measurements from flowsheet : Measurements   9/9/2020 2:20 PM CDT Height Measured - Standard 68.5 in    Height/Length Estimated 73 cm    Weight Measured - Standard 186.2 lb    BSA 2.02 m2    Body Mass Index 27.9 kg/m2  HI         Review / Management   Results review      Impression and Plan   Diagnosis     Diabetes mellitus type 1 (WZW18-MY E10.9).     Hyperlipidemia NOS (AAU64-HX E11.69).     Overweight (BMI 25.0-29.9) (KEM92-CK E66.3).       Detailed startup checklist completed  - Introduction to CGM and components  - Set Up Display Device/    Low and high alerts   Sensor Code   Transmitter SN  - Insert Sensor and attach Transmitter   Choosing a sensor site   Insert sensor with applicator   Snap in transmitter  - Pair Transmitter and start sensor   Wait for transmitter to pair    Tap Start Sensor     No readings during 2 hr warmup     Keep display device within 20 feet during warmup  - Home Screen Overview   Sensor Glucose Reading   Trend Arrow   Trend Graph   High and low alert levels  - Treatment Decision   Use meter if symptoms do not match reader   Use meter if it doesn't show both a number and arrow    - Ending Sensor Session   Remove sensor and transmitter together   Remove transmitter from marks   Keep TRANSMITTER   -  Dexcom Support Team resources      Plan: Follow up in ~ 1 mo if pt feels pump settings need adjusting  Wear sensor and change every 10 days     Goals: A1c <8%  Time in range 80 - 180 >70%      Professional Services   Time spent with pt 60 min   cc Dr. Kb Mcqueen

## 2022-02-15 NOTE — NURSING NOTE
Quick Intake Entered On:  8/10/2021 1:08 PM CDT    Performed On:  8/10/2021 1:07 PM CDT by Rupal White               Summary   Weight Measured :   183.6 lb(Converted to: 183 lb 10 oz, 83.280 kg)    Height Measured :   67 in(Converted to: 5 ft 7 in, 170.18 cm)    Body Mass Index :   28.75 kg/m2 (HI)    Body Surface Area :   1.98 m2   Height/Length Estimated :   73 cm(Converted to: 2 ft 5 in, 28.74 in)    Rupal White - 8/10/2021 1:07 PM CDT

## 2022-02-15 NOTE — NURSING NOTE
Quick Intake Entered On:  5/5/2020 3:28 PM CDT    Performed On:  5/5/2020 3:22 PM CDT by Mari Guillen RN               Summary   Chief Complaint :   Recheck BP   Height Measured :   68.5 in(Converted to: 5 ft 8 in, 173.99 cm)    Systolic Blood Pressure :   173 mmHg (HI)    Diastolic Blood Pressure :   77 mmHg   Mean Arterial Pressure :   109 mmHg   Peripheral Pulse Rate :   69 bpm   Vital Signs Comments :   Encouraged pt to come back for BP check next week; he denies HA, dizziness, changes in vision, etc.   BP Site :   Right arm   Pulse Site :   Brachial artery   BP Method :   Electronic   HR Method :   Electronic   Mari Guillen RN - 5/5/2020 3:27 PM CDT

## 2022-02-15 NOTE — PROGRESS NOTES
Patient:   ROSALIO ANDRADE            MRN: [id number]            FIN: [id number]               Age:   72 years     Sex:  Male     :  1945   Associated Diagnoses:   Diabetes mellitus type 1; Hyperlipidemia NOS; Overweight (BMI 25.0-29.9)   Author:   Rupal White      Visit Information   Visit type:  Diabetes Self Management Education .    Referral source:  Richar RIZZO, Kb.       Chief Complaint   Uncontrolled DM Type 1 (ROLANDO), Hyperlipidemia, Overweight       History of Present Illness   Discussed nutrition, diabetes, and lifestyle management with pt.  Pt is here today very interested in learning more about an insulin pump.     Nutrition:  After last consult pt became much more aware of carbohydrate intake and has been following a carb controlled plan.  Pt continues to eat a very high carb diet ~300 gm / day.  breakfast ~95gm CHO, noon meal protein smoothie with fruit, evening balanced protein, vegetable, starch; evening snack larger portion of popcorn, sweets etc.    Physical Activity:  1 hour daily variety (swim, walk, weights etc), goes to [place] over the winter months and walks 3 miles daily   Stress:   moderate  Sleep: good  Support: wife  Insulin:  Lantus 20u HS, Novolog 1u:7gm CHO with correction factor of 40 pts.  Avg Novolog u at meals 8-12u/ meal down from 15-18u/ meal  BG Testing: BG range 44 - 475 within the past two weeks, numerous high readings 300's, pt is not using correction factor or I:C ratio   Routine diabetes care:  up to date with eye, dental, and foot exams - no concerns  Estimated Average Glucose (eAG) 215 mg/dL with A1C of 9.1%       Health Status   Allergies:    Nonallergic Reactions (Selected)  Severity Not Documented  Atorvastatin (Joint pain)   Medications:  (Selected)   Prescriptions  Prescribed  B-D PEN NDL SHRT [id number]() CHRISTINA: B-D PEN NDL SHRT [id number]() CHRISTINA, See Instructions, Instructions: INJECT FOUR TIMES DAILY AS DIRECTED, Supply, # 400 EA, 3 Refill(s), Pharmacy: [facility] [id number], keep on hold and pt will notify when needed, INJECT FOUR TIMES DAILY AS DIRECTED...  CONTOUR TEST STRIPS 100'S: CONTOUR TEST STRIPS 100'S, See Instructions, Instructions: TEST FOUR TIMES DAILY AS DIRECTED, Supply, # 400 EA, 3 Refill(s), Pharmacy: [facility] [id number], keep on hold and pt will notify when needed, TEST FOUR TIMES DAILY AS DIRECTED  Contour test strips and lancets: Contour test strips and lancets, See Instructions, Instructions: testing 4 times daily, # 400 EA, 11 Refill(s), Type: Soft Stop, Pharmacy: [facility] [id number]  Glucose tabs: Glucose tabs, See Instructions, Instructions: use as needed for hypoglycemia, # 100 EA, 3 Refill(s), Type: Maintenance, Pharmacy: [facility]  Lantus Solostar Pen 100 units/mL subcutaneous solution: ( 20 unit(s) ), subcutaneous, hs, # 18 mL, 3 Refill(s), Type: Maintenance, Pharmacy: [facility], keep on file and pt will notify when needed, 20 unit(s) subcutaneous hs  NovoLOG FlexPen 100 units/mL subcutaneous solution: ( 50 unit(s) ), subcutaneous, daily, # 45 mL, 3 Refill(s), Pharmacy: [facility], keep on hold and pt will notify when needed, 50 unit(s) subcutaneous daily  NovoLOG FlexPen 100 units/mL subcutaneous solution: ( 50 unit(s) ), subcutaneous, tidac, # 3 mL, 1 Refill(s), Type: Maintenance, samples given to patient (Rx)  ULTRA-FINE III SHORT PEN NEEDL MG INJECTABLE: ULTRA-FINE III SHORT PEN NEEDL MG INJECTABLE, See Instructions, Instructions: injects 4 times daily, # 400 EA, 11 Refill(s), Pharmacy: [facility] [id number]  aspirin 81 mg oral tablet: 1 tab(s) ( 81 mg ), PO, Daily, # 30 tab(s), 0 Refill(s), Type: Maintenance, OTC (Rx)  clobetasol 0.05% topical cream: 1 varsha, TOP, BID, Instructions: apply a thin film; apply to affected rash, # 15 g, 1 Refill(s), Type: Maintenance, Pharmacy: [facility] [id number], 1 varsha top bid,Instr:apply a thin film; apply to affected rash  levothyroxine 200 mcg  "(0.2 mg) oral tablet: 1 tab(s) ( 200 mcg ), po, daily, # 90 tab(s), 3 Refill(s), Pharmacy: [facility] [id number], keep on hold and pt will notify when needed, 1 tab(s) po daily  pravastatin 20 mg oral tablet: 1 tab(s) ( 20 mg ), PO, hs, # 90 tab(s), 3 Refill(s), Type: Maintenance, Pharmacy: [facility] [id number], keep on file and pt will notify when needed, 1 tab(s) po hs  Documented Medications  Documented  Cinnamon: ( 1,000 mg ), po, daily, 0 Refill(s), Type: Maintenance  Fish Oil: 0  Flax Seed Oil: 0  Metamucil: See Instructions, Instructions: 1 tbsp daily, 0 Refill(s), Type: Maintenance  Multiple Vitamins oral capsule: 1 cap(s), PO, Daily, 0 Refill(s)  Vitamin C: ( 1,000 mg ), daily, 0 Refill(s)   Problem list:    All Problems  Diabetes mellitus type 1 / ICD-9-.01 / Confirmed  Intolerance of drug / SNOMED CT 83295598 / Confirmed  Essential tremor / ICD-9-.1 / Confirmed  Hyperlipidemia / SNOMED CT 18243148 / Confirmed  Hypothyroid / ICD-9-.9 / Confirmed      Histories   Past Medical History:    Active  Hyperlipidemia (74540516)  Hypothyroid (244.9)  Comments:  2010 CST 1:40 PM CST - Kassi Link CMA  2007  Diabetes mellitus type 1 (250.01)  Comments:  2010 CST 1:41 PM GARCIA - Kassi Link CMA  2007  Essential tremor (333.1)   Family History:    Diabetes mellitus type I  Father ()  Bowel obstruction  Father ()  [place] - Cancer of uterus  Mother ()     Procedure history:    Screening for malignant neoplasm of colon (SNOMED CT 4762090824) on 2015 at 70 Years.  Comments:  2016 8:15 AM - Adali Pantoja is negative  Sialogram (SNOMED CT 371322648) on 2006 at 60 Years.  Comments:  3/29/2013 10:58 AM - Stuttgen , Yoanna  \"Stone.  Swollen gland.\"  removal of stone, left mandibular gland in the month of 2005 at 60 Years.  Sigmoidoscopy (SNOMED CT 35284475) performed by Sky Bui MD on 10/17/2002 at 57 Years.   Social History:  "       Alcohol Assessment: Low Risk            1-2 times per month, 1 drinks/episode average.      Tobacco Assessment: Past      Substance Abuse Assessment: Denies Substance Abuse      Employment and Education Assessment            Retired, Work/School description: /, [employer].  Previous               employment/school: [employer].      Home and Environment Assessment            Marital status: .  Spouse/Partner name: Vicenta.      Exercise and Physical Activity Assessment: Regular exercise            Exercise frequency: Daily.  Exercise type: Aerobics, Walking, Weight lifting.        Physical Examination   Measurements from flowsheet : Measurements   4/18/2017 4:28 PM CDT Height Measured - Standard 68.5 in    Weight Measured - Standard 168.75 lb    BSA 1.92 m2    Body Mass Index 25.28 kg/m2         Health Maintenance      Recommendations     Pending (in the next year)        OverDue           Tetanus Vaccine due  07/26/12  and every 10  year(s)           DM - Communication with Managing Provider due  05/29/16  and every 1  year(s)           DM - Foot Exam due  05/29/16  and every 1  year(s)           Influenza Vaccine due  11/24/16  and every 1  year(s)           DM - HgbA1c due  03/02/17  and every 3  month(s)        Due            Fall Risk Screen (Male) due  04/19/17  and every 1  year(s)           Lung Cancer Screen (Male) due  04/19/17  and every 1  year(s)        Refused            Zoster/Shingles Vaccine due  04/19/17  One-time only        Due In Future            DM - Microalbumin not due until  12/02/17  and every 1  year(s)           Alcohol Misuse Screen (Male) not due until  12/16/17  and every 1  year(s)           Depression Screen (Male) not due until  12/16/17  and every 1  year(s)           High Blood Pressure Screen (Male) not due until  12/16/17  and every 1  year(s)           Tobacco Use Screen (Male) not due until  12/16/17   and every 1  year(s)           DM - Eye Exam not due until  12/23/17  and every 1  year(s)           Body Mass Index Check (Male) not due until  04/18/18  and every 1  year(s)     Satisfied (in the past 1 year)        Satisfied            Alcohol Misuse Screen (Male) on  12/16/16.           Body Mass Index Check (Male) on  04/18/17.           Body Mass Index Check (Male) on  08/23/16.           Body Mass Index Check (Male) on  06/15/16.           DM - Eye Exam on  12/23/16.           DM - HgbA1c on  12/02/16.           DM - HgbA1c on  06/02/16.           DM - Microalbumin on  12/02/16.           DM - Microalbumin on  12/02/16.           Depression Screen (Male) on  12/16/16.           Depression Screen (Male) on  12/16/16.           Depression Screen (Male) on  12/16/16.           High Blood Pressure Screen (Male) on  12/16/16.           High Blood Pressure Screen (Male) on  12/16/16.           High Blood Pressure Screen (Male) on  06/15/16.           Lipid Disorders Screen (Male) on  06/02/16.           Lipid Disorders Screen (Male) on  06/02/16.           Lipid Disorders Screen (Male) on  06/02/16.           Lipid Disorders Screen (Male) on  06/02/16.           Tobacco Use Screen (Male) on  12/16/16.           Tobacco Use Screen (Male) on  06/15/16.          Review / Management   Results review      Impression and Plan   Diagnosis     Diabetes mellitus type 1 (TDF18-IG E10.9).     Hyperlipidemia NOS (JEX65-TJ E11.69).     Overweight (BMI 25.0-29.9) (OFH59-DG E66.3).       Counseled: Patient, [id number] Self Care Behaviors   Today the patient was provided with a review and further instruction on the progression of diabetes mellitus, prevention of complications, insulin management, BG testing, and lifestyle guidelines.  Discussed insulin pump management   Healthy Eating - Discussed importance of accurate carbohydrate counting with insulin to carb ratio and for the insulin pump, reading food labels, appropriate portion  sizes, well balanced meals, diabetic plate method as a general rule.  Follow Therapeutic Lifestyle Changes (TLC) nutrition plan for heart healthy eating.  Weight management and mindful eating   Being Active - Education on how exercise helps with :    - lowering BG by increasing the muscles ability to take up and use glucose   - weight loss   - healthier heart (improve lipid profile)   - improve sleep, mood, energy   - decrease stress    Monitoring - Reviewed recommended testing schedule and blood glucose target levels.    Taking Medication - will continue with correction factor and insulin to carb ratio  Problem Solving - Pt is shown today the basic mechanisms of the pump filling the reservoir, inserting infusion set and bolusing with carb counting and bolus wizzard.  Pt felt good with handling and button pushing. Pt would be a good canidate for the pump.  Assisted pt with [employer] pump paperwork.  Pt understands his medical support team assistance, resources provided (written and apps, internet); good control of stress  Healthy Coping - support of friends, family, and medical support team   Reducing Risks - Recommend [facility], adequate sleep, importance of controlling BG to prevent developing further complications related to uncontrolled DM including nephropathy, neuropathy, retinopathy, and cardiovascular disease.  Discussed importance of proper skin, dental, foot and eye care      Goals:   1.  Practice healthy stress management and get good quality sleep with the goal of 7-8 hours per night.    2. Recommend 30 minutes of physical activity 5 or more days per week.    3.  Eat in a healthy way, per diabetic plate method.  Nutrition reference: Eat 3 meals a day; snacks are optional.  A meal is three or more food groups; make it colorful for better nutrition.  Incorporate TLC dietary heart healthy guidelines.    4.  Total Carbohydrate intake 30 grams for meals, snacks (optional) < 15 grams  5.  Pt  to monitor BG 3-6x/day.  BG goals: Fasting and before meals 80 - 120 mg/dL, 2 hrs after the start of a meal 80 - 150 mg/dL.    6.  Read handouts provided.  c-peptide and fasting glucose orders in chart  7.  Follow up with insulin pump start       Professional Services   Time spent with pt 60 min   cc Dr. Kb Mcqueen

## 2022-02-15 NOTE — TELEPHONE ENCOUNTER
---------------------  From: Malika Joya CMA   To: ROSALIO ANDRADE    Sent: 6/7/2019 3:48:03 PM CDT  Subject: General Message-luc Gayle,    Looks like Lucie left you a message on 6/5.  We have providers that do them here in [place] or [place].  What location would you prefer?    Malika

## 2022-02-15 NOTE — TELEPHONE ENCOUNTER
---------------------  From: Anisha Bell RN (Phone Messages Pool ([id number]_[facility]))   To: Rupal White;     Sent: 9/21/2021 3:15:28 PM CDT  Subject: CONSUMER MESSAGE  FW: Diabetes           ---------------------  From: ROSALIO MALLOY  To: RUST  Sent: 09/21/2021 03:06 p.m. CDT  Subject: Diabetes  Hi Rupal White,    I have been having more lows the last several days and I think I need a pump adjustment. May I see you before I leave for [place] on Monday, September 27?    Thanks,  Rosalio Malloy.---------------------  From: Rupal Whiet   To: Phone Messages Pool ([id number] [facility] [place]);     Sent: 9/21/2021 3:37:30 PM CDT  Subject: RE: CONSUMER MESSAGE  FW: Diabetes     Tomorrow 9/22/2021 I just had a cancellation and put you in the slot at 10:30.  Let me know if you can't make it - otherwise you are on the schedule.      Rupal Mari---------------------  From: Rupal White   To: ROSALIO MALLOY    Sent: 9/21/2021 3:39:50 PM CDT  Subject: FW: CONSUMER MESSAGE  FW: Diabetes

## 2022-02-15 NOTE — PROGRESS NOTES
Patient:   ROSALIO ANDRADE            MRN: [id number]            FIN: [id number]               Age:   76 years     Sex:  Male     :  1945   Associated Diagnoses:   Diabetes mellitus type 1; Hyperlipidemia NOS; Overweight (BMI 25.0-29.9)   Author:   Rupal White      Visit Information   Visit type:  Diabetes Self Management Education .    Referral source:  Richar RIZZO, Kb.       Chief Complaint   Uncontrolled DM Type 1 (ROLANDO), Hyperlipidemia, Overweight       History of Present Illness   Discussed nutrition, diabetes, and lifestyle management with pt.  Pt started Medtronic 630G pump on 17. Pt started Dexcom G6 CGMS 2020    a1c 8.5% = 197 eAG; above pt's personal target of <8% and higher than ADA goal of <7%.    Pt really likes the Dexcom G6, benefits from the low alarms and states he can treat a low when the alarm goes off   Nutrition:  does not follow a low carb plan consuming 50 - 70gm carbs per meal, wide variety of foods with adequate fruit and vegetables   Physical Activity: significantly decreased, left shoulder rotator cuff repair due to a fall, surgery completed 5/3/2021, recovery with range of motion is not going as well as anticipated and may need additional surgery.    Still trying to take a daily walk   Stress:   moderate, anticipate going to [place] for the winter months   Sleep: good  Support: wife  Insulin:  Novolog via pump    BG Testing: ~ 4+x/ day if no sensor available otherwise uses sensor   Routine diabetes care:  eye exam due 2021, dental upcoming appointment 21, and foot exams UTD- no concerns    ICR  12:00      6  adding 3:00am  5  15:00     6.5 changing to 6     Sensitivity  0:00      37  adding 20:00  40    Target   120 - 120     Basal:   20.488 decreased slightly to 20.425  MN   0.600 0.575  3am   0.675      6:30am  0.95   13:00 0.95   18:00 0.95    22:00  0.80      Insulin action  3.5 hours  Max Bolus    17u  Max Basal   1u/ hr     30 day Average sensor glucose 184  mg/dL (98.3% CGM active)     Time in target range 70 - 180  56.3% (goal 70%+)  High 181 - 250    42.7%  Very High >250    20.9%  Low 54 - 69     1.0%  Very low <54     0.0%    Basal 41%  Bolus 59%    Total daily dose 49.4u +/- 7.8u         Health Status   Allergies:    Allergic Reactions (Selected)  No known allergies   Medications:  (Selected)   Prescriptions  Prescribed  CONTOUR NEXT TEST STRIP: See Instructions, Instructions: TEST 4 TIMES A DAY, # 400 unknown unit, 3 Refill(s), Type: Maintenance, Pharmacy: [id number], TEST 4 TIMES A DAY  CONTOUR NEXT TEST STRIPS: CONTOUR NEXT TEST STRIPS, See Instructions, Instructions: pt testing 4 times daily- pt has a pump and needing to test more often due to fluctuating BS's - Dx - Z96.41 and E10.9, Supply, # 400 EA, 3 Refill(s), Type: Maintenance, Pharmacy: [facility],...  Dexcom G6 : Dexcom G6 , See Instructions, Instructions: use to see glucose readings, Supply, # 1 EA, 1 Refill(s), Type: Maintenance  Dexcom G6 sensors: Dexcom G6 sensors, See Instructions, Instructions: change sensor every 10 days  E10.9, Supply, # 9 EA, 11 Refill(s), Type: Maintenance  Dexcom G6 transmitter: Dexcom G6 transmitter, See Instructions, Instructions: change every 3 months, Supply, # 1 EA, 3 Refill(s), Type: Maintenance  Glucagon Emergency Kit for Low Blood Sugar 1 mg injection: See Instructions, Instructions: 1mg injection if pt unable to safely consume glucose, # 1 EA, 0 Refill(s), Type: Maintenance, Pharmacy: [facility], keep on hold, 1mg injection if pt unable to safely consume glucose  Glucose tabs: Glucose tabs, See Instructions, Instructions: use as needed for hypoglycemia, # 100 EA, 3 Refill(s), Type: Maintenance, Pharmacy: [facility]  NovoLOG 100 units/mL injectable solution: See Instructions, Instructions: INJECT UP TO 70 UNITS DAILY VIA PUMP-  Dx E10.9, # 90 mL, 3 Refill(s), Type: Maintenance, Pharmacy: [facility], INJECT UP TO 70 UNITS  DAILY VIA PUMP-  Dx E10.9, 67, in, 12/01/20 8:34:00 CST, Height Measured, 191.4, lb...  ULTRA-FINE III SHORT PEN NEEDL MG INJECTABLE: ULTRA-FINE III SHORT PEN NEEDL MG INJECTABLE, See Instructions, Instructions: injects 4 times daily, # 400 EA, 11 Refill(s), Pharmacy: [id number]  aspirin 81 mg oral tablet: 1 tab(s) ( 81 mg ), PO, Daily, # 30 tab(s), 0 Refill(s), Type: Maintenance, OTC (Rx)  levothyroxine 175 mcg (0.175 mg) oral tablet: = 1 tab(s) ( 175 mcg ), Oral, daily, # 90 tab(s), 3 Refill(s), Type: Maintenance, Pharmacy: [facility], 1 tab(s) Oral daily, 67, in, 12/01/20 8:34:00 CST, Height Measured, 191.4, lb, 12/01/20 8:34:00 CST, Weight Measured  pravastatin 20 mg oral tablet: See Instructions, Instructions: TAKE 1 TABLET BY MOUTH ONCE DAILY AT BEDTIME, # 90 unknown unit, 3 Refill(s), Pharmacy: [facility], TAKE 1 TABLET BY MOUTH ONCE DAILY AT BEDTIME, 67, in, 12/01/20 8:34:00 CST, Height Measured, 191.4, lb, 12/01/20 8:34...  Documented Medications  Documented  Metamucil: Oral, 0 Refill(s), Type: Maintenance,    Medications          *denotes recorded medication          ULTRA-FINE III SHORT PEN NEEDL MG INJECTABLE: See Instructions, injects 4 times daily, 400 EA.          CONTOUR NEXT TEST STRIP: See Instructions, TEST 4 TIMES A DAY, 400 unknown unit, 3 Refill(s).          CONTOUR NEXT TEST STRIPS: See Instructions, pt testing 4 times daily- pt has a pump and needing to test more often due to fluctuating BS's - Dx - Z96.41 and E10.9, 400 EA, 3 Refill(s).          Glucose tabs: See Instructions, use as needed for hypoglycemia, 100 EA.          Dexcom G6 : See Instructions, use to see glucose readings, 1 EA, 1 Refill(s).          Dexcom G6 sensors: See Instructions, change sensor every 10 days  E10.9, 9 EA, 11 Refill(s).          Dexcom G6 transmitter: See Instructions, change every 3 months, 1 EA, 3 Refill(s).          aspirin 81 mg oral tablet: 81 mg, 1 tab(s), PO, Daily, 30  tab(s).          Glucagon Emergency Kit for Low Blood Sugar 1 mg injection: See Instructions, 1mg injection if pt unable to safely consume glucose, 1 EA, 0 Refill(s).          NovoLOG 100 units/mL injectable solution: See Instructions, INJECT UP TO 70 UNITS DAILY VIA PUMP-  Dx E10.9, 90 mL, 3 Refill(s).          levothyroxine 175 mcg (0.175 mg) oral tablet: 175 mcg, 1 tab(s), Oral, daily, 90 tab(s), 3 Refill(s).          pravastatin 20 mg oral tablet: See Instructions, TAKE 1 TABLET BY MOUTH ONCE DAILY AT BEDTIME, 90 unknown unit, 3 Refill(s).          *Metamucil: Oral, 0 Refill(s).       Problem list:    All Problems  Intolerance of drug / SNOMED CT 50712470 / Confirmed  Insulin pump in place / SNOMED CT 8879088298 / Confirmed  Hypothyroid / SNOMED CT 42229618 / Confirmed  Hyperlipidemia / SNOMED CT 98475732 / Confirmed  Essential tremor / SNOMED CT 5822125803 / Confirmed  Diabetes mellitus type 1 / SNOMED CT 406427096 / Confirmed  Canceled: Long term (current) use of insulin / SNOMED CT 326102393      Histories   Past Medical History:    Active  Hyperlipidemia (46658455)  Hypothyroid (51262320)  Comments:  2010 CST 1:40 PM Kassi Gonzalez CMA  2007  Diabetes mellitus type 1 (675947076)  Comments:  2010 CST 1:41 PM Kassi Gonzalez CMA  2007  Essential tremor (4553259844)   Family History:    Diabetes mellitus type I  Father ()  Bowel obstruction  Father ()  [place] - Cancer of uterus  Mother ()     Procedure history:    Rotator cuff repair (SNOMED CT 023520050) performed by John Paul DO on 5/3/2021 at 76 Years.  Comments:  2021 12:22 PM Marianna Ryan  Left.  Colonoscopy (SNOMED CT 972886306) performed by Mikhail Dawson MD on 2019 at 74 Years.  Comments:  2020 8:52 AM CDT - Marianna Villanueva  Indication: Positive Cologuard.             Sedation: MAC.    2019 9:23 AM CDT - [place]stad CMA, Malika  Tubular adenoma - negative for high grade dysplasia -  "Repeat in 3years  Screening for colon cancer (SNOMED CT 3920937594) performed by Richar RIZZO, Kb on 5/20/2019 at 74 Years.  Comments:  6/3/2019 4:46 PM CDT - Yoanna Danielson MA result:  positive  Recommendation:  needs colonoscopy  Screening for malignant neoplasm of colon (SNOMED CT 7898510888) on 12/7/2015 at 70 Years.  Comments:  1/30/2016 8:15 AM CST - Adali Pantoja is negative  Sialogram (SNOMED CT 741652393) on 2/1/2006 at 60 Years.  Comments:  3/29/2013 10:58 AM CDT - Yoanna Porras  \"Stone.  Swollen gland.\"  removal of stone, left mandibular gland in the month of 12/2005 at 60 Years.  Sigmoidoscopy (SNOMED CT 43556809) performed by Sky Bui MD on 10/17/2002 at 57 Years.  Insulin pump, device (SNOMED CT 2322674967).   Social History:        Electronic Cigarette/Vaping Assessment            Electronic Cigarette Use: Never.      Alcohol Assessment            1-2 times per month, 1 drinks/episode average.      Tobacco Assessment            Former smoker, quit more than 30 days ago, Cigarettes      Employment and Education Assessment            Retired, Work/School description: /, [employer].  Previous               employment/school: [employer].      Home and Environment Assessment            Marital status: .  Spouse/Partner name: Vicenta.      Exercise and Physical Activity Assessment            Exercise frequency: Daily.  Exercise type: Aerobics, Walking, Weight lifting.        Physical Examination   Measurements from flowsheet : Measurements   8/10/2021 1:07 PM CDT Height Measured - Standard 67 in    Height/Length Estimated 73 cm    Weight Measured - Standard 183.6 lb    BSA 1.98 m2    Body Mass Index 28.75 kg/m2  HI         Review / Management   Results review      Impression and Plan   Diagnosis     Diabetes mellitus type 1 (BEG45-AA E10.9).     Hyperlipidemia NOS (WTE42-RE E11.69).     Overweight (BMI " 25.0-29.9) (CPR46-AW E66.3).       Counseled: Patient, Further instruction on AADE7 Self Care Behaviors  Discussed living well with diabetes, further information about diabetes disease process, reviewed chronic condition and appropriate treatment and self management  Healthy Eating - Large focus on accuracy of carbohydrate counting and decreasing carb load at meals/ dispersing them throughout the day - pt admits he likes to eat large portions.  Reviewed one way of trying to reduce glycemic variability through following a carbohydrate controlled meal plan - pt not on board with this yet.  Pt given meal planning ideas to incorporate dietary recommendations,  Education review on decreasing cardiovascular risk with following Therapeutic Lifestyle Changes (TLC) nutrition plan for heart healthy eating and healthy eating.    Being Active - Education on how exercise helps with : will resume as able    - lowering BG by increasing the muscles ability to take up and use glucose   - weight loss   - healthier heart (improve lipid profile)   - improve sleep, mood, energy   - decrease stress      Monitoring - Continue with Dexcom G6 and use BG testing as needed  Patient continues to meet criteria for CGM coverage;   - patient has type one diabetes mellitus; and  - patient has been using CGM daily; and  - patient is insulin treated with a continues infusion of insulin via pump; and  - patient is frequently adjusting insulin on the basis of CGM readings  - within six months prior to ordering the CGM, the patient has had an in-person visit with the practitioner; and determined that criteria (1-4) above are met; and   - every six months following the initial prescription of the CGM, the treating practitioner has an in-person visit with the patient to assess adherence to their CGM regimen and diabetes treatment plan    Taking Medication - on NovoLog via pump   Problem Solving -  Pt's battery cover needs replacing along with reservoir  ring.  Pt to call [facility] to be shipped a replacement pump.  Pump settings adjusted based on sensor download  Reviewed Pump settings (basal/bolus), menu options and use, bolus wizard suspend, temp basal BG testing recommendations, hypo/hyperglycemia guidelines, infusion set changes and fill and DKA prevention.  Wear sensor and change every 10 days   medical support team assistance, resources provided (written); good control of stress, pump adjustments made today slightly but elevated reading and low readings likely related to user recommended routine changes.  Bolus early 20+ min prior to meals, test BG prior to ALL meals, bolus for ALL snacks >5gm CHO, use temp basal for exercise  Healthy Coping - support of friends, family, and medical support team   Reducing Risks - Detailed education on potential complications associated with uncontrolled diabetes and prevention recommendations.  Recommendations include: annual eye exam, good oral cares with brushing BID and flossing daily, routine dental apts. good foot care tips and shoe wear - discussed monofilament test yearly.  Importance of adequate sleep and good control of BG to prevent developing complications related to uncontrolled DM including nephropathy, neuropathy, retinopathy, and cardiovascular disease.  Weight loss goal of 7 % of current body weight pounds as a reasonable goal to help increase insulin sensitivity      Pt able to verbalize understanding of above education, handouts provided for additional resources.       Goals:   1.  Practice healthy stress management and get good quality sleep with the goal of 7-8 hours per night.    2.   Physical activity: Recommend 30 min of physical activity on 5 or more days/ week.    3.  Eat in a healthy way, per diabetic plate method.  Nutrition reference: Eat 3 meals a day; snacks are optional.  A meal is three or more food groups; make it colorful for better nutrition.    4. Count carbohydrates and use bolus wizard  for all meals and snacks bolus 15-20min prior to eating   5.  Use sensor and if not available BG testing 4+x/ day  Goal pre-meals 75 - 120; 2 hrs after meals 75 - 140   6.  Be aware of s/sx of hypo/ hyperglycemia and follow treatment protocol   7.  Always have back up pump supplies on hand and vial/ syringe with battery and glucose tablets   8. Follow up in 2 mo   9.  A1c <7%  Time in range 80 - 180 >70%      Professional Services   Time spent with pt 60 min   cc Dr. Kb Mcqueen

## 2022-03-04 DIAGNOSIS — E10.9 TYPE 1 DIABETES MELLITUS WITHOUT COMPLICATION (H): Primary | ICD-10-CM

## 2022-03-04 RX ORDER — PROCHLORPERAZINE 25 MG/1
SUPPOSITORY RECTAL
COMMUNITY
Start: 2021-05-17 | End: 2022-03-04

## 2022-03-04 RX ORDER — PROCHLORPERAZINE 25 MG/1
SUPPOSITORY RECTAL
Qty: 3 EACH | Refills: 1 | Status: SHIPPED | OUTPATIENT
Start: 2022-03-04 | End: 2022-10-26

## 2022-03-05 NOTE — TELEPHONE ENCOUNTER
Request from [facility] for refill of Dexcom G6 sensor, change every 10 days  E10.9    Received via [employer] in basket.

## 2022-03-07 ENCOUNTER — TELEPHONE (OUTPATIENT)
Dept: FAMILY MEDICINE | Facility: CLINIC | Age: 77
End: 2022-03-07

## 2022-03-07 DIAGNOSIS — E10.9 TYPE 1 DIABETES MELLITUS WITHOUT COMPLICATION (H): ICD-10-CM

## 2022-03-07 NOTE — TELEPHONE ENCOUNTER
Reason for Call:  Medication or medication refill:    Do you use a [facility]?  Name of the pharmacy and phone number for the current request:      Name of the medication requested: dexcom sensors    Other request:     Can we leave a detailed message on this number? YES    Phone number patient can be reached at: Home number on file [phone] (home)    Best Time: anytime    Call taken on 3/7/2022 at 9:36 AM by Sandy Barreto

## 2022-03-08 ENCOUNTER — MYC REFILL (OUTPATIENT)
Dept: FAMILY MEDICINE | Facility: CLINIC | Age: 77
End: 2022-03-08

## 2022-03-08 ENCOUNTER — TELEPHONE (OUTPATIENT)
Dept: FAMILY MEDICINE | Facility: CLINIC | Age: 77
End: 2022-03-08

## 2022-03-08 DIAGNOSIS — E10.9 TYPE 1 DIABETES MELLITUS WITHOUT COMPLICATION (H): ICD-10-CM

## 2022-03-08 NOTE — TELEPHONE ENCOUNTER
Reason for Call:  Medication or medication refill:    Do you use a [facility]?  Name of the pharmacy and phone number for the current request: [facility]      Name of the medication requested: Dexcom Sensors    Other request: Patient is needing more than a month supply. Patients wife is leaving for [place] tomorrow to bring him Dexcoms. Needing this sent ASAP    Can we leave a detailed message on this number? YES    Phone number patient can be reached at: Cell number on file:    Telephone Information:   Mobile [phone]       Best Time: ASAP    Call taken on 3/8/2022 at 12:44 PM by Babs Reina

## 2022-03-08 NOTE — TELEPHONE ENCOUNTER
Pt needing refill of his sensors - states he  Only rec'd #3 and typically gets #9.  New order request sent to  [facility]

## 2022-03-08 NOTE — TELEPHONE ENCOUNTER
Called [phone]    They received order for  on 3/4/22    Today sent RX for SENSOR.    DOMINIC Walsh  [facility]

## 2022-03-08 NOTE — TELEPHONE ENCOUNTER
[facility] stating this is needing to be ASAP as they are leaving town tomorrow. Please contact [facility] ASAP in regards to sensor.

## 2022-03-22 NOTE — TELEPHONE ENCOUNTER
Per [facility]    Needing refill of Dexcom G6 Sensors- Dexcom G4 sensors sent instead - please send new script - new order pended with Dx code attached

## 2022-04-03 ENCOUNTER — HEALTH MAINTENANCE LETTER (OUTPATIENT)
Age: 77
End: 2022-04-03

## 2022-05-06 ENCOUNTER — MEDICAL CORRESPONDENCE (OUTPATIENT)
Dept: HEALTH INFORMATION MANAGEMENT | Facility: CLINIC | Age: 77
End: 2022-05-06
Payer: COMMERCIAL

## 2022-05-10 ENCOUNTER — TRANSFERRED RECORDS (OUTPATIENT)
Dept: HEALTH INFORMATION MANAGEMENT | Facility: CLINIC | Age: 77
End: 2022-05-10
Payer: COMMERCIAL

## 2022-05-23 DIAGNOSIS — E10.9 TYPE 1 DIABETES MELLITUS WITHOUT COMPLICATION (H): Primary | ICD-10-CM

## 2022-05-24 RX ORDER — INSULIN ASPART 100 [IU]/ML
INJECTION, SOLUTION INTRAVENOUS; SUBCUTANEOUS
Qty: 90 ML | Refills: 3 | Status: SHIPPED | OUTPATIENT
Start: 2022-05-24 | End: 2022-10-24

## 2022-05-24 NOTE — TELEPHONE ENCOUNTER
Prescription approved per [facility] Refill Protocol.    Last Written Prescription Date: 11/23/21  Last Fill Quantity: 90ml,  # refills: 3   Last office visit with prescribing provider: 11/23/21 DM BRM   11/16/21 A1c, lipid panel, TSH completed

## 2022-06-01 ENCOUNTER — ALLIED HEALTH/NURSE VISIT (OUTPATIENT)
Dept: EDUCATION SERVICES | Facility: CLINIC | Age: 77
End: 2022-06-01
Payer: COMMERCIAL

## 2022-06-01 VITALS — BODY MASS INDEX: 30.43 KG/M2 | HEIGHT: 67 IN | WEIGHT: 193.9 LBS

## 2022-06-01 DIAGNOSIS — E10.65 UNCONTROLLED TYPE 1 DIABETES MELLITUS WITH HYPERGLYCEMIA (H): ICD-10-CM

## 2022-06-01 DIAGNOSIS — E10.9 TYPE 1 DIABETES MELLITUS (H): Primary | ICD-10-CM

## 2022-06-01 PROCEDURE — G0108 DIAB MANAGE TRN  PER INDIV: HCPCS | Performed by: DIETITIAN, REGISTERED

## 2022-06-01 RX ORDER — PROCHLORPERAZINE 25 MG/1
SUPPOSITORY RECTAL
COMMUNITY
Start: 2022-03-17 | End: 2022-07-01

## 2022-06-01 NOTE — LETTER
"    6/1/2022         RE: Irwin Malloy  [address] [address]        Dear Colleague,    Thank you for referring your patient, Irwin Malloy, to the [facility]. Please see a copy of my visit note below.            Diabetes Self-Management Education & Support    Presents for:  Tandem X2 with control IQ technology pump start, transitioning from medtronic pump and sensor    SUBJECTIVE/OBJECTIVE:  Diabetes education in the past 24mo: No  Diabetes type: Type 1  Disease course: Stable  How confident are you filling out medical forms by yourself:: Quite a bit  Cultural Influences/Ethnic Background:  Not  or       Diabetes Symptoms & Complications:  Fatigue: No  Neuropathy: No  Polydipsia: No  Polyphagia: No  Polyuria: No  Visual change: No  Slow healing wounds: No  Autonomic neuropathy: No  CVA: No  Heart disease: No  Nephropathy: No  Peripheral neuropathy: No  Peripheral Vascular Disease: No  Retinopathy: No  Sexual dysfunction: No    Patient Problem List and Family Medical History reviewed for relevant medical history, current medical status, and diabetes risk factors.    Vitals:  Ht 1.702 m (5' 7\")   Wt 88 kg (193 lb 14.4 oz)   BMI 30.37 kg/m    Estimated body mass index is 30.37 kg/m  as calculated from the following:    Height as of this encounter: 1.702 m (5' 7\").    Weight as of this encounter: 88 kg (193 lb 14.4 oz).   Last 3 BP:   BP Readings from Last 3 Encounters:   11/23/21 (!) 144/79   11/01/21 (!) 160/92   08/19/21 (!) 146/76       History   Smoking Status     Not on file   Smokeless Tobacco     Not on file       Labs:  No results found for: A1C  No results found for: GLC  No results found for: LDL  No results found for: HDL]  No results found for: GFRESTIMATED  No results found for: GFRESTBLACK  No results found for: CR  No results found for: MICROALBUMIN    Healthy Eating:  Cultural/Zoroastrianism diet restrictions?: No  Meal planning/habits: " Carb counting, Heart healthy, Smaller portions  How many times a week on average do you eat food made away from home (restaurant/take-out)?: 2  Meals include: Breakfast, Lunch, Dinner, Evening Snack  Beverages: Water, Coffee, Milk, Diet soda    Being Active:  Days per week of moderate to strenuous exercise (like a brisk walk): (P) 7  On average, minutes per day of exercise at this level: (P) 40  How intense was your typical exercise? : (P) Moderate (like brisk walking)  Exercise Minutes per Week: (P) 280  Barrier to exercise: None    Monitoring:  Blood Glucose Meter: ContourNext, CGM  Times checking blood sugar at home (number): 5+ with sensor   Times checking blood sugar at home (per): Day  Blood glucose trend: Fluctuating            Taking Medications:  Diabetes Medication(s)     Insulin       insulin aspart (NOVOLOG VIAL) 100 UNITS/ML vial    INJECT UP TO 70 UNITS DAILY VIA PUMP- DX E10.9          Current Treatments: Insulin Pump  Dose schedule: (P) Pre-breakfast, Pre-lunch, Pre-dinner  Given by: (P) Patient  Injection/Infusion sites: (P) Abdomen    Problem Solving:  Glucose tabs, juice, rapid acting carbs on hand at all times       Reducing Risks:  CAD Risks: Diabetes Mellitus, Hypertension  Has dilated eye exam at least once a year?: Yes  Sees dentist every 6 months?: Yes  Feet checked by healthcare provider in the last year?: Yes    Healthy Coping:  Informal Support system:: [place] based, Family, Friends, Neighbors, Spouse  Patient Activation Measure Survey Score:  No flowsheet data found.    Diabetes knowledge and skills assessment:   Patient is knowledgeable in diabetes management concepts related to: Being Active, Problem Solving, Reducing Risks and Healthy Coping    Patient needs further education on the following diabetes management concepts: Healthy Eating and Insulin Pump Concepts Problem solving with insulin pump therapy (BG monitoring; hypoglycemia signs/symptoms, treatment (glucagon) and prevention;  hyperglycemia signs/symptoms, treatment and prevention; ketones, DKA signs/symptoms and prevention)  Hands on practice with basic pump button use    Based on learning assessment above, most appropriate setting for further diabetes education would be: Individual setting.      INTERVENTIONS:    Education provided today on:  AADE Self-Care Behaviors:  Healthy Eating: carbohydrate counting, weight reduction, heart healthy diet, portion control, plate planning method and label reading    Insulin Pump Training:   T:slim X2 Insulin Delivery System    Pump overview:  touch screen and general navigation, rechargeable battery    Home screen/ menu:  Status, battery life, CGM icons, units remaining, time/date, IOB (insulin on board),   Options - profiles, settings, suspend/resume insulin, history, temp basal, alerts   Bolus - food and correction calculator, extended bolus, reverse correction    Personal profile:  Timed settings: basal rate, correction factor, I:C ratio, BG target, edit, duplicate or review  Bolus settings: duration of insulin action, max bolus  Enter carbs into bolus calculator:  Yes     Dexcom CGM:  Entering transmitter ID and sensor code  Urgent Low alert: Fixed at 55 mg/dl  Control IQ feature ON    Infusion set:  Loading cartridge- minimum and maximum amounts, site selection and prep, tubing and canula fills, change set every 2-3 days    Safety:  troubleshooting, low reservoir, alerts and alarms, importance of back-up plan, hypoglycemia, sick day management, hyperglycemia and DKA prevention    T:Connect: uploading pump: Yes     Additional topics: 24/7 Customer Care helpline [phone], removal for xray, CT, and MRI, back up plan, when and how to order pump supplies, when to call a healthcare provider.     Education materials provided to patient:  T:Slim Diabetes Care packet , Carb controlled meal plan    ASSESSMENT:  Tandem control IQ insulin pump with Dexcom CGMS successfully started today.  Patient  does have some shaking in his hands and states this is worse in the a.m. compared to afternoon we reviewed viewed some problem solving tactics today to help with insulin fill to cartridge.    Pt verbalized understanding of concepts discussed and recommendations provided today. Patient was able to start insulin pump without difficulty. No               PLAN  See Patient Instructions for co-developed, patient-stated behavior change goals.  AVS printed and provided to patient today. See Follow-Up section for recommended follow-up.      Time Spent: 90 minutes  Encounter Type: Individual    Any diabetes medication dose changes were made via the CDE Protocol and Collaborative Practice Agreement with the patient's referring provider. A copy of this encounter was shared with the provider.

## 2022-06-01 NOTE — PATIENT INSTRUCTIONS
Goals:  Practice healthy stress management and mindful eating - think are you physically hungry or are you bored, stressed, emotional etc, make of list of things to do besides eat.    Try to get good quality sleep with a goal of 7-8 hours per night.  Stay physically active daily.  Recommend working up to a total of 30 minutes on 5 days/ week.  Recommend a fitness tracker.     Eat in a healthy way- eliminate trans fats, limit saturated fats and added sugars; follow the plate method - picture above.  Keep a food record (MyFitnessPal, Loseit).    A meal is 3 or more food groups; make it colorful for better nutrition.    Total Carbohydrates (in grams) = Breakfast  30    Lunch  30    Supper  30    If desired snacks 15                                     Call Tandem for pump questions     Call Dexcom for sensor issues    Call Keshia for settings to be adjusted

## 2022-06-01 NOTE — PROGRESS NOTES
"Diabetes Self-Management Education & Support    Presents for:  Tandem X2 with control IQ technology pump start, transitioning from medtronic pump and sensor    SUBJECTIVE/OBJECTIVE:  Diabetes education in the past 24mo: No  Diabetes type: Type 1  Disease course: Stable  How confident are you filling out medical forms by yourself:: Quite a bit  Cultural Influences/Ethnic Background:  Not  or       Diabetes Symptoms & Complications:  Fatigue: No  Neuropathy: No  Polydipsia: No  Polyphagia: No  Polyuria: No  Visual change: No  Slow healing wounds: No  Autonomic neuropathy: No  CVA: No  Heart disease: No  Nephropathy: No  Peripheral neuropathy: No  Peripheral Vascular Disease: No  Retinopathy: No  Sexual dysfunction: No    Patient Problem List and Family Medical History reviewed for relevant medical history, current medical status, and diabetes risk factors.    Vitals:  Ht 1.702 m (5' 7\")   Wt 88 kg (193 lb 14.4 oz)   BMI 30.37 kg/m    Estimated body mass index is 30.37 kg/m  as calculated from the following:    Height as of this encounter: 1.702 m (5' 7\").    Weight as of this encounter: 88 kg (193 lb 14.4 oz).   Last 3 BP:   BP Readings from Last 3 Encounters:   11/23/21 (!) 144/79   11/01/21 (!) 160/92   08/19/21 (!) 146/76       History   Smoking Status     Not on file   Smokeless Tobacco     Not on file       Labs:  No results found for: A1C  No results found for: GLC  No results found for: LDL  No results found for: HDL]  No results found for: GFRESTIMATED  No results found for: GFRESTBLACK  No results found for: CR  No results found for: MICROALBUMIN    Healthy Eating:  Cultural/Zoroastrianism diet restrictions?: No  Meal planning/habits: Carb counting, Heart healthy, Smaller portions  How many times a week on average do you eat food made away from home (restaurant/take-out)?: 2  Meals include: Breakfast, Lunch, Dinner, Evening Snack  Beverages: Water, Coffee, Milk, Diet soda    Being Active:  Days per " week of moderate to strenuous exercise (like a brisk walk): (P) 7  On average, minutes per day of exercise at this level: (P) 40  How intense was your typical exercise? : (P) Moderate (like brisk walking)  Exercise Minutes per Week: (P) 280  Barrier to exercise: None    Monitoring:  Blood Glucose Meter: ContourNext, CGM  Times checking blood sugar at home (number): 5+ with sensor   Times checking blood sugar at home (per): Day  Blood glucose trend: Fluctuating            Taking Medications:  Diabetes Medication(s)     Insulin       insulin aspart (NOVOLOG VIAL) 100 UNITS/ML vial    INJECT UP TO 70 UNITS DAILY VIA PUMP- DX E10.9          Current Treatments: Insulin Pump  Dose schedule: (P) Pre-breakfast, Pre-lunch, Pre-dinner  Given by: (P) Patient  Injection/Infusion sites: (P) Abdomen    Problem Solving:  Glucose tabs, juice, rapid acting carbs on hand at all times       Reducing Risks:  CAD Risks: Diabetes Mellitus, Hypertension  Has dilated eye exam at least once a year?: Yes  Sees dentist every 6 months?: Yes  Feet checked by healthcare provider in the last year?: Yes    Healthy Coping:  Informal Support system:: Abbey based, Family, Friends, Neighbors, Spouse  Patient Activation Measure Survey Score:  No flowsheet data found.    Diabetes knowledge and skills assessment:   Patient is knowledgeable in diabetes management concepts related to: Being Active, Problem Solving, Reducing Risks and Healthy Coping    Patient needs further education on the following diabetes management concepts: Healthy Eating and Insulin Pump Concepts Problem solving with insulin pump therapy (BG monitoring; hypoglycemia signs/symptoms, treatment (glucagon) and prevention; hyperglycemia signs/symptoms, treatment and prevention; ketones, DKA signs/symptoms and prevention)  Hands on practice with basic pump button use    Based on learning assessment above, most appropriate setting for further diabetes education would be: Individual  setting.      INTERVENTIONS:    Education provided today on:  AADE Self-Care Behaviors:  Healthy Eating: carbohydrate counting, weight reduction, heart healthy diet, portion control, plate planning method and label reading    Insulin Pump Training:   T:slim X2 Insulin Delivery System    Pump overview:  touch screen and general navigation, rechargeable battery    Home screen/ menu:  Status, battery life, CGM icons, units remaining, time/date, IOB (insulin on board),   Options - profiles, settings, suspend/resume insulin, history, temp basal, alerts   Bolus - food and correction calculator, extended bolus, reverse correction    Personal profile:  Timed settings: basal rate, correction factor, I:C ratio, BG target, edit, duplicate or review  Bolus settings: duration of insulin action, max bolus  Enter carbs into bolus calculator:  Yes     Dexcom CGM:  Entering transmitter ID and sensor code  Urgent Low alert: Fixed at 55 mg/dl  Control IQ feature ON    Infusion set:  Loading cartridge- minimum and maximum amounts, site selection and prep, tubing and canula fills, change set every 2-3 days    Safety:  troubleshooting, low reservoir, alerts and alarms, importance of back-up plan, hypoglycemia, sick day management, hyperglycemia and DKA prevention    T:Connect: uploading pump: Yes     Additional topics: 24/7 Customer Care helpline [phone], removal for xray, CT, and MRI, back up plan, when and how to order pump supplies, when to call a healthcare provider.     Education materials provided to patient:  T:Slim Diabetes Care packet , Carb controlled meal plan    ASSESSMENT:  Tandem control IQ insulin pump with Dexcom CGMS successfully started today.  Patient does have some shaking in his hands and states this is worse in the a.m. compared to afternoon we reviewed viewed some problem solving tactics today to help with insulin fill to cartridge.    Pt verbalized understanding of concepts discussed and recommendations  provided today. Patient was able to start insulin pump without difficulty. No               PLAN  See Patient Instructions for co-developed, patient-stated behavior change goals.  AVS printed and provided to patient today. See Follow-Up section for recommended follow-up.      Time Spent: 90 minutes  Encounter Type: Individual    Any diabetes medication dose changes were made via the CDE Protocol and Collaborative Practice Agreement with the patient's referring provider. A copy of this encounter was shared with the provider.

## 2022-06-08 DIAGNOSIS — E10.9 TYPE 1 DIABETES MELLITUS WITHOUT COMPLICATION (H): ICD-10-CM

## 2022-06-08 NOTE — TELEPHONE ENCOUNTER
Routing refill request to provider for review/approval because:  Drug not active on patient's medication list    Patient is requesting Contour next trest strips.    Please advise.

## 2022-06-15 ENCOUNTER — MYC MEDICAL ADVICE (OUTPATIENT)
Dept: FAMILY MEDICINE | Facility: CLINIC | Age: 77
End: 2022-06-15
Payer: COMMERCIAL

## 2022-06-21 ENCOUNTER — DOCUMENTATION ONLY (OUTPATIENT)
Dept: LAB | Facility: CLINIC | Age: 77
End: 2022-06-21

## 2022-06-21 DIAGNOSIS — E06.3 HYPOTHYROIDISM DUE TO HASHIMOTO'S THYROIDITIS: Primary | ICD-10-CM

## 2022-06-21 DIAGNOSIS — E10.65 TYPE 1 DIABETES MELLITUS WITH HYPERGLYCEMIA (H): ICD-10-CM

## 2022-06-21 PROBLEM — M75.02 ADHESIVE CAPSULITIS OF LEFT SHOULDER: Status: ACTIVE | Noted: 2021-08-23

## 2022-06-21 PROBLEM — G25.0 ESSENTIAL TREMOR: Status: ACTIVE | Noted: 2022-06-21

## 2022-06-21 PROBLEM — S46.012A TRAUMATIC COMPLETE TEAR OF LEFT ROTATOR CUFF: Status: ACTIVE | Noted: 2021-08-23

## 2022-06-21 PROBLEM — E03.9 HYPOTHYROIDISM: Status: ACTIVE | Noted: 2022-06-21

## 2022-06-21 PROBLEM — E78.5 HYPERLIPIDEMIA: Status: ACTIVE | Noted: 2022-06-21

## 2022-06-21 RX ORDER — LOSARTAN POTASSIUM 50 MG/1
50 TABLET ORAL DAILY
COMMUNITY
Start: 2021-11-23 | End: 2022-10-24

## 2022-06-21 RX ORDER — PRAVASTATIN SODIUM 20 MG
1 TABLET ORAL AT BEDTIME
COMMUNITY
Start: 2021-11-23 | End: 2022-10-24

## 2022-06-21 RX ORDER — LEVOTHYROXINE SODIUM 175 UG/1
1 TABLET ORAL DAILY
COMMUNITY
Start: 2022-04-08 | End: 2022-10-24

## 2022-06-24 ENCOUNTER — LAB (OUTPATIENT)
Dept: LAB | Facility: CLINIC | Age: 77
End: 2022-06-24
Payer: COMMERCIAL

## 2022-06-24 DIAGNOSIS — E06.3 HYPOTHYROIDISM DUE TO HASHIMOTO'S THYROIDITIS: ICD-10-CM

## 2022-06-24 DIAGNOSIS — E10.65 TYPE 1 DIABETES MELLITUS WITH HYPERGLYCEMIA (H): ICD-10-CM

## 2022-06-24 LAB
ANION GAP SERPL CALCULATED.3IONS-SCNC: 9 MMOL/L (ref 7–15)
BUN SERPL-MCNC: 14.7 MG/DL (ref 8–23)
CALCIUM SERPL-MCNC: 9.1 MG/DL (ref 8.8–10.2)
CHLORIDE SERPL-SCNC: 102 MMOL/L (ref 98–107)
CHOLEST SERPL-MCNC: 174 MG/DL
CREAT SERPL-MCNC: 0.93 MG/DL (ref 0.67–1.17)
CREAT UR-MCNC: 189 MG/DL
DEPRECATED HCO3 PLAS-SCNC: 25 MMOL/L (ref 22–29)
GFR SERPL CREATININE-BSD FRML MDRD: 85 ML/MIN/1.73M2
GLUCOSE SERPL-MCNC: 202 MG/DL (ref 70–99)
HBA1C MFR BLD: 7.4 % (ref 0–5.6)
HDLC SERPL-MCNC: 61 MG/DL
LDLC SERPL CALC-MCNC: 99 MG/DL
MICROALBUMIN UR-MCNC: <12 MG/L
MICROALBUMIN/CREAT UR: NORMAL MG/G{CREAT}
NONHDLC SERPL-MCNC: 113 MG/DL
POTASSIUM SERPL-SCNC: 4.4 MMOL/L (ref 3.4–5.3)
SODIUM SERPL-SCNC: 136 MMOL/L (ref 136–145)
TRIGL SERPL-MCNC: 69 MG/DL
TSH SERPL DL<=0.005 MIU/L-ACNC: 0.35 UIU/ML (ref 0.3–4.2)

## 2022-06-24 PROCEDURE — 83036 HEMOGLOBIN GLYCOSYLATED A1C: CPT

## 2022-06-24 PROCEDURE — 36415 COLL VENOUS BLD VENIPUNCTURE: CPT

## 2022-06-24 PROCEDURE — 80061 LIPID PANEL: CPT

## 2022-06-24 PROCEDURE — 82043 UR ALBUMIN QUANTITATIVE: CPT

## 2022-06-24 PROCEDURE — 84443 ASSAY THYROID STIM HORMONE: CPT

## 2022-06-24 PROCEDURE — 80048 BASIC METABOLIC PNL TOTAL CA: CPT

## 2022-06-30 PROBLEM — E06.3 HYPOTHYROIDISM DUE TO HASHIMOTO'S THYROIDITIS: Status: ACTIVE | Noted: 2022-06-21

## 2022-06-30 PROBLEM — Z00.00 HEALTH CARE MAINTENANCE: Status: ACTIVE | Noted: 2022-06-30

## 2022-06-30 PROBLEM — E78.00 PURE HYPERCHOLESTEROLEMIA: Status: ACTIVE | Noted: 2022-06-21

## 2022-06-30 RX ORDER — PROCHLORPERAZINE 25 MG/1
SUPPOSITORY RECTAL
COMMUNITY
Start: 2021-05-17 | End: 2022-10-07

## 2022-06-30 RX ORDER — PROCHLORPERAZINE 25 MG/1
SUPPOSITORY RECTAL
COMMUNITY
Start: 2021-11-04

## 2022-07-01 ENCOUNTER — OFFICE VISIT (OUTPATIENT)
Dept: FAMILY MEDICINE | Facility: CLINIC | Age: 77
End: 2022-07-01
Payer: COMMERCIAL

## 2022-07-01 VITALS
OXYGEN SATURATION: 98 % | TEMPERATURE: 96.7 F | SYSTOLIC BLOOD PRESSURE: 132 MMHG | DIASTOLIC BLOOD PRESSURE: 77 MMHG | BODY MASS INDEX: 29.88 KG/M2 | HEART RATE: 75 BPM | WEIGHT: 190.8 LBS

## 2022-07-01 DIAGNOSIS — E78.00 PURE HYPERCHOLESTEROLEMIA: ICD-10-CM

## 2022-07-01 DIAGNOSIS — Z23 COVID-19 VACCINE ADMINISTERED: ICD-10-CM

## 2022-07-01 DIAGNOSIS — E10.65 TYPE 1 DIABETES MELLITUS WITH HYPERGLYCEMIA (H): ICD-10-CM

## 2022-07-01 DIAGNOSIS — E06.3 HYPOTHYROIDISM DUE TO HASHIMOTO'S THYROIDITIS: ICD-10-CM

## 2022-07-01 DIAGNOSIS — Z00.00 HEALTH CARE MAINTENANCE: ICD-10-CM

## 2022-07-01 DIAGNOSIS — Z86.0100 HISTORY OF COLONIC POLYPS: Primary | ICD-10-CM

## 2022-07-01 PROCEDURE — 0064A COVID-19,PF,MODERNA (18+ YRS BOOSTER .25ML): CPT | Performed by: INTERNAL MEDICINE

## 2022-07-01 PROCEDURE — 91306 COVID-19,PF,MODERNA (18+ YRS BOOSTER .25ML): CPT | Performed by: INTERNAL MEDICINE

## 2022-07-01 PROCEDURE — 99214 OFFICE O/P EST MOD 30 MIN: CPT | Mod: 25 | Performed by: INTERNAL MEDICINE

## 2022-07-01 ASSESSMENT — LIFESTYLE VARIABLES
HOW MANY STANDARD DRINKS CONTAINING ALCOHOL DO YOU HAVE ON A TYPICAL DAY: 1 OR 2
HOW OFTEN DO YOU HAVE SIX OR MORE DRINKS ON ONE OCCASION: NEVER
SKIP TO QUESTIONS 9-10: 1
HOW OFTEN DO YOU HAVE A DRINK CONTAINING ALCOHOL: 2-4 TIMES A MONTH
AUDIT-C TOTAL SCORE: 2

## 2022-07-01 NOTE — PROGRESS NOTES
"  Assessment & Plan   Problem List Items Addressed This Visit        Endocrine    Type 1 diabetes mellitus with hyperglycemia (H)     type I DM, controlled, late onset diabetes in adulthood (ROLANDO)  hypoglycemic medications:  insulin therapy:  Medtronic pump (initiated in '17),  started on Dexcom6 CGM (9/2020), May, 2022 - changed to Tandem G6 sensor with auto-basal features  Reviewed pump and glycemic trend settings -persistent late day hypoglycemic trends typically after 10 PM  Reviewed current settings with basal rate of 0.7 beginning at 5 AM, basal rate 0.575 in place at midnight  He is a sensitivity of 1-50 and insulin carb ratio of 1:6.5  Only change made today was to change basal rate to 0.575 beginning at 10 PM (up from 12 AM)     last HbA1c: 7.4%   microvascular complications: none  macrovascular complications: none  ASA/SIMON/statin:  ASA 81mg daily, pravastatin 20mg qhs           Hypothyroidism due to Hashimoto's thyroiditis    Pure hypercholesterolemia       Other    Health care maintenance      -Cologuard positive(5/2019); colonoscopy (6/2019); multiple adenomatous polyps - repeat study now; colonoscopy order placed through [facility]   - PSA 0.3 (11/2015) - declines regular future screening   - intentional tremor (worse in AM) - suspect essential tremor; likely worsened by caffeine.  Low suspicions for parkinsons   - completed COVID vaccination + booster (2nd today)   - fall, 2021 - shingles episode (had not been vaccinated)  - reminded on future need for TDap             Other Visit Diagnoses     History of colonic polyps    -  Primary    Relevant Orders    Colonscopy Screening  Referral    COVID-19 vaccine administered        Relevant Orders    COVID-19,PF,MODERNA (18+ YRS BOOSTER .25ML) (Completed)              BMI:   Estimated body mass index is 29.88 kg/m  as calculated from the following:    Height as of 6/1/22: 1.702 m (5' 7\").    Weight as of this encounter: 86.5 kg (190 lb 12.8 oz). "           Return in about 6 months (around 1/1/2023) for Follow up, with me.    Jose Mcqueen MD  [facility]    Arabella Gayle is a 77 year old presenting for the following health issues:  He has been doing well.  Feels like his left shoulder is now about 80% functionality.  He has been happy with overall results and rehab.  Back to more regular activities and swimming.  Did have shingles episode this past fall.  Describes 2 weeks of her redness right-sided torso flank pains.  Since subsided though does have significant cutaneous scars from the event.  Recently updated his insulin pump to a tandem device with auto basal capabilities with his Dexcom G6.  Having regular late evening lows typically between 10 and midnight.  No issues with going low during the day.  He has been happy with his overall improvement in glycemic control.  Planning on leaving [place] [employer] [place] [place] a week expedition this coming week  Diabetes      History of Present Illness       Diabetes:   He presents for follow up of diabetes.  He is checking home blood glucose with a continuous glucose monitor.  He checks blood glucose before meals, after meals, before and after meals and at bedtime.  Blood glucose is sometimes over 200 and sometimes under 70. When his blood glucose is low, the patient is asymptomatic for confusion, blurred vision, lethargy and reports not feeling dizzy, shaky, or weak.  He is concerned about low blood sugar, several less than 70 in the past few weeks.  He is not experiencing numbness or burning in feet, excessive thirst, blurry vision, weight changes or redness, sores or blisters on feet. The patient has had a diabetic eye exam in the last 12 months. Eye exam performed on 8/30/2021. Location of last eye exam [facility] [place].        He eats 2-3 servings of fruits and vegetables daily.He consumes 0 sweetened beverage(s) daily.He exercises with enough effort to increase his heart  rate 20 to 29 minutes per day.  He exercises with enough effort to increase his heart rate 4 days per week.   He is taking medications regularly.       Review of Systems   Constitutional, HEENT, cardiovascular, pulmonary, gi and gu systems are negative, except as otherwise noted.      Objective    /77   Pulse 75   Temp (!) 96.7  F (35.9  C) (Tympanic)   Wt 86.5 kg (190 lb 12.8 oz)   SpO2 98%   BMI 29.88 kg/m    Body mass index is 29.88 kg/m .  Physical Exam   GENERAL: healthy, alert and no distress  NECK: no adenopathy, no asymmetry, masses, or scars and thyroid normal to palpation  RESP: lungs clear to auscultation - no rales, rhonchi or wheezes  CV: regular rate and rhythm, normal S1 S2, no S3 or S4, no murmur, click or rub, no peripheral edema and peripheral pulses strong  ABDOMEN: soft, nontender, no hepatosplenomegaly, no masses and bowel sounds normal  MS: no gross musculoskeletal defects noted, no edema  Onychomycosis present with normal bilateral monofilament testing    Lab on 06/24/2022   Component Date Value Ref Range Status     Albumin Urine mg/L 06/24/2022 <12.0  mg/L Final    The reference ranges have not been established in urine albumin. The results should be integrated into the clinical context for interpretation.     Albumin Urine mg/g Cr 06/24/2022    Final    Unable to calculate, urine albumin and/or urine creatinine is outside detectable limits.  Microalbuminuria is defined as an albumin:creatinine ratio of 17 to 299 for males and 25 to 299 for females. A ratio of albumin:creatinine of 300 or higher is indicative of overt proteinuria.  Due to biologic variability, positive results should be confirmed by a second, first-morning random or 24-hour timed urine specimen. If there is discrepancy, a third specimen is recommended. When 2 out of 3 results are in the microalbuminuria range, this is evidence for incipient nephropathy and warrants increased efforts at glucose control, blood  pressure control, and institution of therapy with an angiotensin-converting-enzyme (ACE) inhibitor (if the patient can tolerate it).       Creatinine Urine mg/dL 06/24/2022 189.0  mg/dL Final    The reference ranges have not been established in urine creatinine. The results should be integrated into the clinical context for interpretation.     Creatinine 06/24/2022 0.93  0.67 - 1.17 mg/dL Final     Sodium 06/24/2022 136  136 - 145 mmol/L Final     Potassium 06/24/2022 4.4  3.4 - 5.3 mmol/L Final     Urea Nitrogen 06/24/2022 14.7  8.0 - 23.0 mg/dL Final     Chloride 06/24/2022 102  98 - 107 mmol/L Final     Carbon Dioxide (CO2) 06/24/2022 25  22 - 29 mmol/L Final     Anion Gap 06/24/2022 9  7 - 15 mmol/L Final     Glucose 06/24/2022 202 (A) 70 - 99 mg/dL Final     GFR Estimate 06/24/2022 85  >60 mL/min/1.73m2 Final    Effective December 21, 2021 eGFRcr in adults is calculated using the 2021 CKD-EPI creatinine equation which includes age and gender (Juan Miguel et al., NEJ, DOI: 10.1056/WMIVbu9000448)     Calcium 06/24/2022 9.1  8.8 - 10.2 mg/dL Final     Hemoglobin A1C 06/24/2022 7.4 (A) 0.0 - 5.6 % Final    Normal <5.7%   Prediabetes 5.7-6.4%    Diabetes 6.5% or higher     Note: Adopted from ADA consensus guidelines.     Cholesterol 06/24/2022 174  <200 mg/dL Final     Triglycerides 06/24/2022 69  <150 mg/dL Final     Direct Measure HDL 06/24/2022 61  >=40 mg/dL Final     LDL Cholesterol Calculated 06/24/2022 99  <=100 mg/dL Final     Non HDL Cholesterol 06/24/2022 113  <130 mg/dL Final     TSH 06/24/2022 0.35  0.30 - 4.20 uIU/mL Final                   .  ..

## 2022-07-01 NOTE — ASSESSMENT & PLAN NOTE
-Cologuard positive(5/2019); colonoscopy (6/2019); multiple adenomatous polyps - repeat study now; colonoscopy order placed through [facility]   - PSA 0.3 (11/2015) - declines regular future screening   - intentional tremor (worse in AM) - suspect essential tremor; likely worsened by caffeine.  Low suspicions for parkinsons   - completed COVID vaccination + booster (2nd today)   - fall, 2021 - shingles episode (had not been vaccinated)  - reminded on future need for TDap

## 2022-07-01 NOTE — ASSESSMENT & PLAN NOTE
type I DM, controlled, late onset diabetes in adulthood (ROLANDO)  hypoglycemic medications:  insulin therapy:  Medtronic pump (initiated in '17),  started on Dexcom6 CGM (9/2020), May, 2022 - changed to Tandem G6 sensor with auto-basal features  Reviewed pump and glycemic trend settings -persistent late day hypoglycemic trends typically after 10 PM  Reviewed current settings with basal rate of 0.7 beginning at 5 AM, basal rate 0.575 in place at midnight  He is a sensitivity of 1-50 and insulin carb ratio of 1:6.5  Only change made today was to change basal rate to 0.575 beginning at 10 PM (up from 12 AM)     last HbA1c: 7.4%   microvascular complications: none  macrovascular complications: none  ASA/SIMON/statin:  ASA 81mg daily, pravastatin 20mg qhs

## 2022-08-08 ENCOUNTER — ALLIED HEALTH/NURSE VISIT (OUTPATIENT)
Dept: EDUCATION SERVICES | Facility: CLINIC | Age: 77
End: 2022-08-08
Payer: COMMERCIAL

## 2022-08-08 VITALS — BODY MASS INDEX: 30.02 KG/M2 | HEIGHT: 67 IN | WEIGHT: 191.3 LBS

## 2022-08-08 DIAGNOSIS — E10.9 TYPE 1 DIABETES MELLITUS (H): Primary | ICD-10-CM

## 2022-08-08 PROCEDURE — G0108 DIAB MANAGE TRN  PER INDIV: HCPCS | Performed by: DIETITIAN, REGISTERED

## 2022-08-08 NOTE — PROGRESS NOTES
Diabetes Self-Management Education & Support    Presents for: Insulin Pump and CGM Review  Patient presents today with Diabetes type 1     Patient transited from Medtronic pump to the tandem X2 with control IQ technology on 6/1/2022.  Overall the pump has improved diabetes control but patient dislikes alarms which were changed today (no alarms now for hyperglycemia).  Also patient has increased complaints of low glucose readings and with the new pump and sensor combination patient has increased awareness of these lows.  Patient is in control IQ and therefore the main changes that can be made to patient's pump is insulin to carb ratio and correction factor.  Adjustments made today per patient request.  Hemoglobin A1c with ongoing improvement now at 7.4%.  Patient does not follow a carbohydrate controlled meal plan and therefore noted ongoing significant spikes especially after breakfast adjusted insulin to carb ratio for tighter control and also ongoing education on bolusing at least 20 or more minutes prior to eating        SUBJECTIVE/OBJECTIVE:  Presents for: Insulin Pump and CGM Review  Accompanied by: Self  Diabetes education in the past 24mo: Yes  Focus of Visit: Insulin Pump  Diabetes type: Type 1  Disease course: Stable  How confident are you filling out medical forms by yourself:: Extremely  Transportation concerns: No  Difficulty affording diabetes medication?: No  Difficulty affording diabetes testing supplies?: No  Cultural Influences/Ethnic Background:  Not  or       Diabetes Symptoms & Complications:  Fatigue: No  Neuropathy: No  Polydipsia: No  Polyphagia: No  Polyuria: No  Visual change: No  Slow healing wounds: No  Autonomic neuropathy: No  CVA: No  Heart disease: No  Nephropathy: No  Peripheral neuropathy: No  Peripheral Vascular Disease: No  Retinopathy: No  Sexual dysfunction: No    Patient Problem List and Family Medical History reviewed for relevant medical history, current medical  "status, and diabetes risk factors.    Vitals:  Ht 1.702 m (5' 7\")   Wt 86.8 kg (191 lb 4.8 oz)   BMI 29.96 kg/m    Estimated body mass index is 29.96 kg/m  as calculated from the following:    Height as of this encounter: 1.702 m (5' 7\").    Weight as of this encounter: 86.8 kg (191 lb 4.8 oz).   Last 3 BP:   BP Readings from Last 3 Encounters:   07/01/22 132/77   11/23/21 (!) 144/79   11/01/21 (!) 160/92       History   Smoking Status     Former Smoker   Smokeless Tobacco     Never Used       Labs:  Lab Results   Component Value Date    A1C 7.4 06/24/2022     Lab Results   Component Value Date     06/24/2022     08/19/2021     Lab Results   Component Value Date    LDL 99 06/24/2022     Direct Measure HDL   Date Value Ref Range Status   06/24/2022 61 >=40 mg/dL Final   ]  GFR Estimate   Date Value Ref Range Status   06/24/2022 85 >60 mL/min/1.73m2 Final     Comment:     Effective December 21, 2021 eGFRcr in adults is calculated using the 2021 CKD-EPI creatinine equation which includes age and gender (Juan Miguel et al., NEJM, DOI: 10.1056/TXOMni2495736)     No results found for: GFRESTBLACK  Lab Results   Component Value Date    CR 0.93 06/24/2022     No results found for: MICROALBUMIN    Healthy Eating:  Cultural/Restoration diet restrictions?: No  Meal planning/habits: Carb counting, Heart healthy, Smaller portions  How many times a week on average do you eat food made away from home (restaurant/take-out)?: 2  Meals include: Breakfast, Lunch, Dinner, Evening Snack  Beverages: Water, Coffee, Milk, Diet soda    Being Active:  Days per week of moderate to strenuous exercise (like a brisk walk): 7  On average, minutes per day of exercise at this level: 40  How intense was your typical exercise? : Moderate (like brisk walking)  Exercise Minutes per Week: 280  Barrier to exercise: None    Monitoring:  Blood Glucose Meter: [employer]our[employer], CGM  Times checking blood sugar at home (number): 5+  Times checking blood " sugar at home (per): Day  Blood glucose trend: Fluctuating        Patient continues to meet criteria for CGM coverage;   - patient has type one diabetes mellitus; and  - patient has been using CGM daily; and  - patient is insulin treated with insulin completing multiple bolus s daily 3 or more times/ day via insulin pump  - patient is frequently adjusting insulin based on blood glucose readings  - within six months prior to ordering the CGM, the patient has had an in-person visit with the practitioner; and determined that criteria (1-4) above are met; and   - every six months following the initial prescription of the CGM, the treating practitioner has an in-person visit with the patient to assess adherence to their CGM regimen and diabetes treatment plan      Taking Medications:  Diabetes Medication(s)     Insulin       insulin aspart (NOVOLOG VIAL) 100 UNITS/ML vial    INJECT UP TO 70 UNITS DAILY VIA PUMP- DX E10.9          Current Treatments: Insulin Pump  Dose schedule: Pre-breakfast, Pre-lunch, Pre-dinner  Given by: Patient  Injection/Infusion sites: Abdomen    Problem Solving:  Patient has glucose tablets on hand at all times         changed to              Reducing Risks:  CAD Risks: Diabetes Mellitus, Hypertension  Has dilated eye exam at least once a year?: Yes  Sees dentist every 6 months?: Yes  Feet checked by healthcare provider in the last year?: Yes    Healthy Coping:  Informal Support system:: Abbey based, Family, Friends, Neighbors, Spouse  Patient Activation Measure Survey Score:  No flowsheet data found.    Diabetes knowledge and skills assessment:   Patient is knowledgeable in diabetes management concepts related to: Being Active, Monitoring, Problem Solving, Reducing Risks, Healthy Coping and Insulin Pump Concepts Balancing glucose and insulin  Carbohydrate counting  Calculating boluses  Problem solving with insulin pump therapy (BG monitoring; hypoglycemia signs/symptoms, treatment (glucagon) and  prevention; hyperglycemia signs/symptoms, treatment and prevention; ketones, DKA signs/symptoms and prevention)  Hands on practice with basic pump button use    Patient needs further education on the following diabetes management concepts: Healthy Eating, Taking Medication, Problem Solving and Insulin Pump Concepts Balancing glucose and insulin  Problem solving with insulin pump therapy (BG monitoring; hypoglycemia signs/symptoms, treatment (glucagon) and prevention; hyperglycemia signs/symptoms, treatment and prevention; ketones, DKA signs/symptoms and prevention)    Based on learning assessment above, most appropriate setting for further diabetes education would be: Individual setting.      INTERVENTIONS:    Education provided today on:  AADE Self-Care Behaviors:  Healthy Eating: carbohydrate counting, heart healthy diet, portion control, plate planning method and label reading  Monitoring: Using Dexcom G6 sensor and BG testing as a backup  Problem Solving: high blood glucose - causes, signs/symptoms, treatment and prevention, low blood glucose - causes, signs/symptoms, treatment and prevention, carrying a carbohydrate source at all times, safe travel and when to call health care provider    Education specific to insulin pump provided today on:   importance of bolusing before meals, treating hypoglycemia correctly (Rule of 15) and multiple daily injection back-up plan in case of pump failure    Opportunities for ongoing education and support in diabetes-self management were discussed.    Pt verbalized understanding of concepts discussed and recommendations provided today.       Education Materials Provided:  Goals for Your Diabetes Care and My Plate Planner    ASSESSMENT  Improvement in Hgb A1c   Glucose Patterns & Trends:  Hyperglycemia - postprandial specifically breakfast  Hypoglycemia -nocturnal.  Patient which he tries to prevent with frequent glucose tab intake and also afternoon    Changes made on basal,  correction factor and carb ratio to help prevent hypoglycemia patient will follow-up frequently for ongoing adjustment.  Patient may tend to run higher with the changes made today as patient is very frustrated with hypoglycemia.      PLAN  See Patient Instructions for co-developed, patient-stated behavior change goals.  AVS printed and provided to patient today. See Follow-Up section for recommended follow-up.      Time Spent: 30 minutes  Encounter Type: Individual    Any diabetes medication dose changes were made via the CDE Protocol and Collaborative Practice Agreement with the patient's referring provider. A copy of this encounter was shared with the provider.

## 2022-08-08 NOTE — LETTER
8/8/2022         RE: Irwin Malloy  [address] [address]        Dear Colleague,    Thank you for referring your patient, Irwin Malloy, to the [facility]. Please see a copy of my visit note below.    Diabetes Self-Management Education & Support    Presents for: Insulin Pump and CGM Review  Patient presents today with Diabetes type 1     Patient transited from Medtronic pump to the tandem X2 with control IQ technology on 6/1/2022.  Overall the pump has improved diabetes control but patient dislikes alarms which were changed today (no alarms now for hyperglycemia).  Also patient has increased complaints of low glucose readings and with the new pump and sensor combination patient has increased awareness of these lows.  Patient is in control IQ and therefore the main changes that can be made to patient's pump is insulin to carb ratio and correction factor.  Adjustments made today per patient request.  Hemoglobin A1c with ongoing improvement now at 7.4%.  Patient does not follow a carbohydrate controlled meal plan and therefore noted ongoing significant spikes especially after breakfast adjusted insulin to carb ratio for tighter control and also ongoing education on bolusing at least 20 or more minutes prior to eating        SUBJECTIVE/OBJECTIVE:  Presents for: Insulin Pump and CGM Review  Accompanied by: Self  Diabetes education in the past 24mo: Yes  Focus of Visit: Insulin Pump  Diabetes type: Type 1  Disease course: Stable  How confident are you filling out medical forms by yourself:: Extremely  Transportation concerns: No  Difficulty affording diabetes medication?: No  Difficulty affording diabetes testing supplies?: No  Cultural Influences/Ethnic Background:  Not  or       Diabetes Symptoms & Complications:  Fatigue: No  Neuropathy: No  Polydipsia: No  Polyphagia: No  Polyuria: No  Visual change: No  Slow healing wounds: No  Autonomic  "neuropathy: No  CVA: No  Heart disease: No  Nephropathy: No  Peripheral neuropathy: No  Peripheral Vascular Disease: No  Retinopathy: No  Sexual dysfunction: No    Patient Problem List and Family Medical History reviewed for relevant medical history, current medical status, and diabetes risk factors.    Vitals:  Ht 1.702 m (5' 7\")   Wt 86.8 kg (191 lb 4.8 oz)   BMI 29.96 kg/m    Estimated body mass index is 29.96 kg/m  as calculated from the following:    Height as of this encounter: 1.702 m (5' 7\").    Weight as of this encounter: 86.8 kg (191 lb 4.8 oz).   Last 3 BP:   BP Readings from Last 3 Encounters:   07/01/22 132/77   11/23/21 (!) 144/79   11/01/21 (!) 160/92       History   Smoking Status     Former Smoker   Smokeless Tobacco     Never Used       Labs:  Lab Results   Component Value Date    A1C 7.4 06/24/2022     Lab Results   Component Value Date     06/24/2022     08/19/2021     Lab Results   Component Value Date    LDL 99 06/24/2022     Direct Measure HDL   Date Value Ref Range Status   06/24/2022 61 >=40 mg/dL Final   ]  GFR Estimate   Date Value Ref Range Status   06/24/2022 85 >60 mL/min/1.73m2 Final     Comment:     Effective December 21, 2021 eGFRcr in adults is calculated using the 2021 CKD-EPI creatinine equation which includes age and gender (Juan Miguel fuentes al., NEJ, DOI: 10.1056/YJVAiz4447076)     No results found for: GFRESTBLACK  Lab Results   Component Value Date    CR 0.93 06/24/2022     No results found for: MICROALBUMIN    Healthy Eating:  Cultural/Taoist diet restrictions?: No  Meal planning/habits: Carb counting, Heart healthy, Smaller portions  How many times a week on average do you eat food made away from home (restaurant/take-out)?: 2  Meals include: Breakfast, Lunch, Dinner, Evening Snack  Beverages: Water, Coffee, Milk, Diet soda    Being Active:  Days per week of moderate to strenuous exercise (like a brisk walk): 7  On average, minutes per day of exercise at this " level: 40  How intense was your typical exercise? : Moderate (like brisk walking)  Exercise Minutes per Week: 280  Barrier to exercise: None    Monitoring:  Blood Glucose Meter: ContourNext, CGM  Times checking blood sugar at home (number): 5+  Times checking blood sugar at home (per): Day  Blood glucose trend: Fluctuating        Patient continues to meet criteria for CGM coverage;   - patient has type one diabetes mellitus; and  - patient has been using CGM daily; and  - patient is insulin treated with insulin completing multiple bolus s daily 3 or more times/ day via insulin pump  - patient is frequently adjusting insulin based on blood glucose readings  - within six months prior to ordering the CGM, the patient has had an in-person visit with the practitioner; and determined that criteria (1-4) above are met; and   - every six months following the initial prescription of the CGM, the treating practitioner has an in-person visit with the patient to assess adherence to their CGM regimen and diabetes treatment plan      Taking Medications:  Diabetes Medication(s)     Insulin       insulin aspart (NOVOLOG VIAL) 100 UNITS/ML vial    INJECT UP TO 70 UNITS DAILY VIA PUMP- DX E10.9          Current Treatments: Insulin Pump  Dose schedule: Pre-breakfast, Pre-lunch, Pre-dinner  Given by: Patient  Injection/Infusion sites: Abdomen    Problem Solving:  Patient has glucose tablets on hand at all times         changed to              Reducing Risks:  CAD Risks: Diabetes Mellitus, Hypertension  Has dilated eye exam at least once a year?: Yes  Sees dentist every 6 months?: Yes  Feet checked by healthcare provider in the last year?: Yes    Healthy Coping:  Informal Support system:: Abbey based, Family, Friends, Neighbors, Spouse  Patient Activation Measure Survey Score:  No flowsheet data found.    Diabetes knowledge and skills assessment:   Patient is knowledgeable in diabetes management concepts related to: Being Active,  Monitoring, Problem Solving, Reducing Risks, Healthy Coping and Insulin Pump Concepts Balancing glucose and insulin  Carbohydrate counting  Calculating boluses  Problem solving with insulin pump therapy (BG monitoring; hypoglycemia signs/symptoms, treatment (glucagon) and prevention; hyperglycemia signs/symptoms, treatment and prevention; ketones, DKA signs/symptoms and prevention)  Hands on practice with basic pump button use    Patient needs further education on the following diabetes management concepts: Healthy Eating, Taking Medication, Problem Solving and Insulin Pump Concepts Balancing glucose and insulin  Problem solving with insulin pump therapy (BG monitoring; hypoglycemia signs/symptoms, treatment (glucagon) and prevention; hyperglycemia signs/symptoms, treatment and prevention; ketones, DKA signs/symptoms and prevention)    Based on learning assessment above, most appropriate setting for further diabetes education would be: Individual setting.      INTERVENTIONS:    Education provided today on:  AADE Self-Care Behaviors:  Healthy Eating: carbohydrate counting, heart healthy diet, portion control, plate planning method and label reading  Monitoring: Using Dexcom G6 sensor and BG testing as a backup  Problem Solving: high blood glucose - causes, signs/symptoms, treatment and prevention, low blood glucose - causes, signs/symptoms, treatment and prevention, carrying a carbohydrate source at all times, safe travel and when to call health care provider    Education specific to insulin pump provided today on:   importance of bolusing before meals, treating hypoglycemia correctly (Rule of 15) and multiple daily injection back-up plan in case of pump failure    Opportunities for ongoing education and support in diabetes-self management were discussed.    Pt verbalized understanding of concepts discussed and recommendations provided today.       Education Materials Provided:  Goals for Your Diabetes Care and My  Plate Planner    ASSESSMENT  Improvement in Hgb A1c   Glucose Patterns & Trends:  Hyperglycemia - postprandial specifically breakfast  Hypoglycemia -nocturnal.  Patient which he tries to prevent with frequent glucose tab intake and also afternoon    Changes made on basal, correction factor and carb ratio to help prevent hypoglycemia patient will follow-up frequently for ongoing adjustment.  Patient may tend to run higher with the changes made today as patient is very frustrated with hypoglycemia.      PLAN  See Patient Instructions for co-developed, patient-stated behavior change goals.  AVS printed and provided to patient today. See Follow-Up section for recommended follow-up.      Time Spent: 30 minutes  Encounter Type: Individual    Any diabetes medication dose changes were made via the CDE Protocol and Collaborative Practice Agreement with the patient's referring provider. A copy of this encounter was shared with the provider.

## 2022-08-08 NOTE — PATIENT INSTRUCTIONS
Goals:  Practice healthy stress management and mindful eating - think are you physically hungry or are you bored, stressed, emotional etc, make of list of things to do besides eat.    Try to get good quality sleep with a goal of 7-8 hours per night.  Stay physically active daily.  Recommend working up to a total of 30 minutes on 5 days/ week.  Recommend a fitness tracker.     Eat in a healthy way- eliminate trans fats, limit saturated fats and added sugars; follow the plate method - picture above.  Keep a food record (MyFitnessPal, Loseit).    A meal is 3 or more food groups; make it colorful for better nutrition.    Total Carbohydrates (in grams) = Breakfast  30    Lunch  30    Supper  30    If desired snacks 15

## 2022-08-15 ENCOUNTER — ALLIED HEALTH/NURSE VISIT (OUTPATIENT)
Dept: EDUCATION SERVICES | Facility: CLINIC | Age: 77
End: 2022-08-15
Payer: COMMERCIAL

## 2022-08-15 VITALS — BODY MASS INDEX: 30.18 KG/M2 | WEIGHT: 192.3 LBS | HEIGHT: 67 IN

## 2022-08-15 DIAGNOSIS — E10.65 TYPE 1 DIABETES MELLITUS WITH HYPERGLYCEMIA (H): Primary | ICD-10-CM

## 2022-08-15 PROCEDURE — 97803 MED NUTRITION INDIV SUBSEQ: CPT | Performed by: DIETITIAN, REGISTERED

## 2022-08-15 NOTE — LETTER
8/15/2022         RE: Irwin Malloy  [address] [facility] [address]        Dear Colleague,    Thank you for referring your patient, Irwin Malloy, to the [facility]. Please see a copy of my visit note below.    Medical Nutrition Therapy for Diabetes  Visit Type:Reassessment and intervention    Irwin Malloy presents today for MNT and education related to type 1 diabetes.   He is accompanied by self.     ASSESSMENT:   Patient comments/concerns relating to nutrition: work on carb counting, weight control, balancing insulin for improved control     Patient brought with him today a new tandem X2 insulin pump as myself and the tandem care team believes his pump is not functioning correctly.    Patient has had episodes of hypoglycemia down in the 40s where control IQ should have prevented this significant of a low blood sugar.  Patient's new pump and settings were started today during office visit.  Patient also instructed on precise insulin to carbohydrate counting and reviewed the importance of bolusing prior to meals to help prevent postmeal hyperglycemia.    NUTRITION HISTORY:  Pt enjoys eating sweets and other higher carb starches (pasta/ potato etc)  Does very well with adequate fruit/ vegetables/ whole grains     Misses meals? No  Eats out:  1 meals/per week     Previous diet education:  Yes     Food allergies/intolerances: none    EXERCISE:.  30+ min cardio/ weight/ swims when he can    SOCIO/ECONOMIC:   Lives with: spouse    BLOOD GLUCOSE MONITORING:        Patient continues to meet criteria for CGM coverage;   - patient has type one diabetes mellitus; and  - patient has been using CGM daily; and  - patient is insulin treated with insulin completing multiple bolus s daily 3 or more times/ day via insulin pump  - patient is frequently adjusting insulin based on blood glucose readings  - within six months prior to ordering the CGM, the patient has had an  "in-person visit with the practitioner; and determined that criteria (1-4) above are met; and   - every six months following the initial prescription of the CGM, the treating practitioner has an in-person visit with the patient to assess adherence to their CGM regimen and diabetes treatment plan    BG values are: Not in goal  Patient's most recent   Lab Results   Component Value Date    A1C 7.4 06/24/2022    is not meeting goal of <7.0    MEDICATIONS:  Current Outpatient Medications   Medication     aspirin (ASA) 81 MG EC tablet     blood glucose (NO BRAND SPECIFIED) test strip     Continuous Blood Gluc  (DEXCOM G6 ) MAGUE     Continuous Blood Gluc Sensor (DEXCOM G6 SENSOR) MISC     Continuous Blood Gluc Transmit (DEXCOM G6 TRANSMITTER) MISC     insulin aspart (NOVOLOG VIAL) 100 UNITS/ML vial     levothyroxine (SYNTHROID/LEVOTHROID) 175 MCG tablet     losartan (COZAAR) 50 MG tablet     pravastatin (PRAVACHOL) 20 MG tablet     No current facility-administered medications for this visit.       LABS:  Lab Results   Component Value Date    A1C 7.4 06/24/2022     Lab Results   Component Value Date     06/24/2022     08/19/2021     Lab Results   Component Value Date    LDL 99 06/24/2022     Direct Measure HDL   Date Value Ref Range Status   06/24/2022 61 >=40 mg/dL Final   ]  GFR Estimate   Date Value Ref Range Status   06/24/2022 85 >60 mL/min/1.73m2 Final     Comment:     Effective December 21, 2021 eGFRcr in adults is calculated using the 2021 CKD-EPI creatinine equation which includes age and gender (Juan Miguel fuentes al., NEJ, DOI: 10.1056/OFVEca3194478)     No results found for: GFRESTBLACK  Lab Results   Component Value Date    CR 0.93 06/24/2022     No results found for: MICROALBUMIN    ANTHROPOMETRICS:  Vitals: Ht 1.702 m (5' 7\")   Wt 87.2 kg (192 lb 4.8 oz)   BMI 30.12 kg/m    Body mass index is 30.12 kg/m .      Wt Readings from Last 5 Encounters:   08/15/22 87.2 kg (192 lb 4.8 oz) "   08/08/22 86.8 kg (191 lb 4.8 oz)   07/01/22 86.5 kg (190 lb 12.8 oz)   06/01/22 88 kg (193 lb 14.4 oz)   12/21/21 87.1 kg (192 lb)         ESTIMATED KCAL REQUIREMENTS:  2100 kcal per day    NUTRITION DIAGNOSIS: Altered nutrition-related laboratory values related to dx of diabetes as evidenced by a1c 7.4%    NUTRITION INTERVENTION:  Education given to support: general nutrition guidelines, weight reduction, carb counting, exercise, dining out/special occasions, portion control and heart healthy diet      Changed am and noon I:C ratio        PATIENT'S BEHAVIOR CHANGE GOALS:   See Patient Instructions for patient stated behavior change goals. AVS was printed and given to patient at today's appointment.    MONITOR / EVALUATE:  RD will monitor/evaluate:  Blood Glucose / A1c  Pertinent Labs  Weight change    FOLLOW-UP:  4 mo    Time spent in minutes: 60  Encounter: Individual

## 2022-08-16 NOTE — PROGRESS NOTES
Medical Nutrition Therapy for Diabetes  Visit Type:Reassessment and intervention    Irwin Malloy presents today for MNT and education related to type 1 diabetes.   He is accompanied by self.     ASSESSMENT:   Patient comments/concerns relating to nutrition: work on carb counting, weight control, balancing insulin for improved control     Patient brought with him today a new tandem X2 insulin pump as myself and the tandem care team believes his pump is not functioning correctly.    Patient has had episodes of hypoglycemia down in the 40s where control IQ should have prevented this significant of a low blood sugar.  Patient's new pump and settings were started today during office visit.  Patient also instructed on precise insulin to carbohydrate counting and reviewed the importance of bolusing prior to meals to help prevent postmeal hyperglycemia.    NUTRITION HISTORY:  Pt enjoys eating sweets and other higher carb starches (pasta/ potato etc)  Does very well with adequate fruit/ vegetables/ whole grains     Misses meals? No  Eats out:  1 meals/per week     Previous diet education:  Yes     Food allergies/intolerances: none    EXERCISE:.  30+ min cardio/ weight/ swims when he can    SOCIO/ECONOMIC:   Lives with: spouse    BLOOD GLUCOSE MONITORING:        Patient continues to meet criteria for CGM coverage;   - patient has type one diabetes mellitus; and  - patient has been using CGM daily; and  - patient is insulin treated with insulin completing multiple bolus s daily 3 or more times/ day via insulin pump  - patient is frequently adjusting insulin based on blood glucose readings  - within six months prior to ordering the CGM, the patient has had an in-person visit with the practitioner; and determined that criteria (1-4) above are met; and   - every six months following the initial prescription of the CGM, the treating practitioner has an in-person visit with the patient to assess adherence to their CGM regimen  "and diabetes treatment plan    BG values are: Not in goal  Patient's most recent   Lab Results   Component Value Date    A1C 7.4 06/24/2022    is not meeting goal of <7.0    MEDICATIONS:  Current Outpatient Medications   Medication     aspirin (ASA) 81 MG EC tablet     blood glucose (NO BRAND SPECIFIED) test strip     Continuous Blood Gluc  (DEXCOM G6 ) MAGUE     Continuous Blood Gluc Sensor (DEXCOM G6 SENSOR) MISC     Continuous Blood Gluc Transmit (DEXCOM G6 TRANSMITTER) MISC     insulin aspart (NOVOLOG VIAL) 100 UNITS/ML vial     levothyroxine (SYNTHROID/LEVOTHROID) 175 MCG tablet     losartan (COZAAR) 50 MG tablet     pravastatin (PRAVACHOL) 20 MG tablet     No current facility-administered medications for this visit.       LABS:  Lab Results   Component Value Date    A1C 7.4 06/24/2022     Lab Results   Component Value Date     06/24/2022     08/19/2021     Lab Results   Component Value Date    LDL 99 06/24/2022     Direct Measure HDL   Date Value Ref Range Status   06/24/2022 61 >=40 mg/dL Final   ]  GFR Estimate   Date Value Ref Range Status   06/24/2022 85 >60 mL/min/1.73m2 Final     Comment:     Effective December 21, 2021 eGFRcr in adults is calculated using the 2021 CKD-EPI creatinine equation which includes age and gender (Juan Miguel et al., NE, DOI: 10.1056/MDQKma3480394)     No results found for: GFRESTBLACK  Lab Results   Component Value Date    CR 0.93 06/24/2022     No results found for: MICROALBUMIN    ANTHROPOMETRICS:  Vitals: Ht 1.702 m (5' 7\")   Wt 87.2 kg (192 lb 4.8 oz)   BMI 30.12 kg/m    Body mass index is 30.12 kg/m .      Wt Readings from Last 5 Encounters:   08/15/22 87.2 kg (192 lb 4.8 oz)   08/08/22 86.8 kg (191 lb 4.8 oz)   07/01/22 86.5 kg (190 lb 12.8 oz)   06/01/22 88 kg (193 lb 14.4 oz)   12/21/21 87.1 kg (192 lb)         ESTIMATED KCAL REQUIREMENTS:  2100 kcal per day    NUTRITION DIAGNOSIS: Altered nutrition-related laboratory values related to dx of " diabetes as evidenced by a1c 7.4%    NUTRITION INTERVENTION:  Education given to support: general nutrition guidelines, weight reduction, carb counting, exercise, dining out/special occasions, portion control and heart healthy diet      Changed am and noon I:C ratio        PATIENT'S BEHAVIOR CHANGE GOALS:   See Patient Instructions for patient stated behavior change goals. AVS was printed and given to patient at today's appointment.    MONITOR / EVALUATE:  RD will monitor/evaluate:  Blood Glucose / A1c  Pertinent Labs  Weight change    FOLLOW-UP:  4 mo    Time spent in minutes: 60  Encounter: Individual

## 2022-09-04 ENCOUNTER — HEALTH MAINTENANCE LETTER (OUTPATIENT)
Age: 77
End: 2022-09-04

## 2022-09-08 ENCOUNTER — TRANSFERRED RECORDS (OUTPATIENT)
Dept: HEALTH INFORMATION MANAGEMENT | Facility: CLINIC | Age: 77
End: 2022-09-08

## 2022-09-08 LAB — RETINOPATHY: POSITIVE

## 2022-10-05 DIAGNOSIS — E10.65 TYPE 1 DIABETES MELLITUS WITH HYPERGLYCEMIA (H): Primary | ICD-10-CM

## 2022-10-07 RX ORDER — PROCHLORPERAZINE 25 MG/1
SUPPOSITORY RECTAL
Qty: 3 EACH | Refills: 5 | Status: SHIPPED | OUTPATIENT
Start: 2022-10-07 | End: 2022-10-24

## 2022-10-07 NOTE — TELEPHONE ENCOUNTER
Routing refill request to provider for review/approval because:  LOV 7/21/2022    TREVOR non protocol     DOMINIC Moore  [facility]

## 2022-10-07 NOTE — TELEPHONE ENCOUNTER
Routing refill request to provider for review/approval because:  Drug not on the [facility] refill protocol     LOV 7/01/2022     DOMINIC Moore  [facility]

## 2022-10-24 DIAGNOSIS — E10.65 TYPE 1 DIABETES MELLITUS WITH HYPERGLYCEMIA (H): ICD-10-CM

## 2022-10-24 DIAGNOSIS — E06.3 HYPOTHYROIDISM DUE TO HASHIMOTO'S THYROIDITIS: Primary | ICD-10-CM

## 2022-10-24 DIAGNOSIS — E78.00 PURE HYPERCHOLESTEROLEMIA: ICD-10-CM

## 2022-10-24 DIAGNOSIS — E10.9 TYPE 1 DIABETES MELLITUS WITHOUT COMPLICATION (H): ICD-10-CM

## 2022-10-24 NOTE — TELEPHONE ENCOUNTER
Medication Question or Refill    Contacts       Type Contact Phone/Fax    10/24/2022 02:15 PM CDT Phone (Incoming) Irwin Malloy (Self) [phone] (M)      Patient calling for the following refills.  Patient has an appointment with PCP on 11/8/22. Patient states he is leaving 11/9/22 for FLA. He is returning on 12/1/22.   Patient would like Malika to call and assist with questions.    What medication are you calling about (include dose and sig)?:   1. levothyroxine (SYNTHROID/LEVOTHROID) 175 MCG tablet [[id number]]     2. pravastatin (PRAVACHOL) 20 MG tablet [[id number]]     3. losartan (COZAAR) 50 MG tablet [[id number]]     4.insulin aspart (NOVOLOG VIAL) 100 UNITS/ML vial [[id number]]     5.Continuous Blood Gluc Transmit (DEXCOM G6 TRANSMITTER) MISC [[id number]]     6. Continuous Blood Gluc Sensor (DEXCOM G6 SENSOR) MISC [[id number]    Controlled Substance Agreement on file:   CSA -- Patient Level:    CSA: None found at the patient level.       Who prescribed the medication?: Dr Kb Mcqueen    Do you need a refill? Yes:     When did you use the medication last? today    Patient offered an appointment?  Patient has an appointment 11/8/22 but needs refills prior to appointment    Do you have any questions or concerns?  Yes:  Questions about lab appointment on 11/1/22.  Requesting DOMINIC Daly to call.    Preferred Pharmacy:   [address] [address]  Phone: [phone] Fax: [phone]      Could we send this information to you in [employer] or would you prefer to receive a phone call?:   Patient would prefer a phone call   Okay to leave a detailed message?: Yes at Cell number on file:    Telephone Information:   Mobile [phone]

## 2022-10-26 RX ORDER — PROCHLORPERAZINE 25 MG/1
SUPPOSITORY RECTAL
Qty: 3 EACH | Refills: 1 | Status: SHIPPED | OUTPATIENT
Start: 2022-10-26 | End: 2023-08-08

## 2022-10-26 RX ORDER — LOSARTAN POTASSIUM 50 MG/1
50 TABLET ORAL DAILY
Qty: 30 TABLET | Refills: 0 | Status: SHIPPED | OUTPATIENT
Start: 2022-10-26 | End: 2022-11-08

## 2022-10-26 RX ORDER — PRAVASTATIN SODIUM 20 MG
20 TABLET ORAL AT BEDTIME
Qty: 30 TABLET | Refills: 0 | Status: SHIPPED | OUTPATIENT
Start: 2022-10-26 | End: 2022-11-08

## 2022-10-26 RX ORDER — PROCHLORPERAZINE 25 MG/1
SUPPOSITORY RECTAL
Qty: 3 EACH | Refills: 5 | Status: SHIPPED | OUTPATIENT
Start: 2022-10-26 | End: 2023-04-04

## 2022-10-26 RX ORDER — INSULIN ASPART 100 [IU]/ML
INJECTION, SOLUTION INTRAVENOUS; SUBCUTANEOUS
Qty: 90 ML | Refills: 0 | Status: SHIPPED | OUTPATIENT
Start: 2022-10-26 | End: 2023-06-16

## 2022-10-26 RX ORDER — LEVOTHYROXINE SODIUM 175 UG/1
175 TABLET ORAL DAILY
Qty: 30 TABLET | Refills: 0 | Status: SHIPPED | OUTPATIENT
Start: 2022-10-26 | End: 2022-11-08

## 2022-10-26 NOTE — TELEPHONE ENCOUNTER
30 day supply sent to cover until next visit.      Last office visit: 7/1/2022   Future Office Visit:   Next 5 appointments (look out 90 days)    Nov 08, 2022  7:00 AM  (Arrive by 6:45 AM)  Provider Visit with Jose Mcqueen MD  [facility] ([facility] ) [address] [facility] [address] [phone]

## 2022-11-01 ENCOUNTER — LAB (OUTPATIENT)
Dept: LAB | Facility: CLINIC | Age: 77
End: 2022-11-01
Payer: COMMERCIAL

## 2022-11-01 DIAGNOSIS — Z11.59 NEED FOR HEPATITIS C SCREENING TEST: ICD-10-CM

## 2022-11-01 DIAGNOSIS — E10.65 TYPE 1 DIABETES MELLITUS WITH HYPERGLYCEMIA (H): ICD-10-CM

## 2022-11-01 LAB
HBA1C MFR BLD: 7.6 % (ref 0–5.6)
HCV AB SERPL QL IA: NONREACTIVE

## 2022-11-01 PROCEDURE — 36415 COLL VENOUS BLD VENIPUNCTURE: CPT

## 2022-11-01 PROCEDURE — 86803 HEPATITIS C AB TEST: CPT

## 2022-11-01 PROCEDURE — 83036 HEMOGLOBIN GLYCOSYLATED A1C: CPT

## 2022-11-08 ENCOUNTER — OFFICE VISIT (OUTPATIENT)
Dept: FAMILY MEDICINE | Facility: CLINIC | Age: 77
End: 2022-11-08
Payer: COMMERCIAL

## 2022-11-08 VITALS
HEART RATE: 66 BPM | DIASTOLIC BLOOD PRESSURE: 78 MMHG | RESPIRATION RATE: 18 BRPM | SYSTOLIC BLOOD PRESSURE: 138 MMHG | OXYGEN SATURATION: 98 % | BODY MASS INDEX: 30.42 KG/M2 | HEIGHT: 67 IN | TEMPERATURE: 97.5 F | WEIGHT: 193.8 LBS

## 2022-11-08 DIAGNOSIS — E06.3 HYPOTHYROIDISM DUE TO HASHIMOTO'S THYROIDITIS: ICD-10-CM

## 2022-11-08 DIAGNOSIS — K21.9 GASTROESOPHAGEAL REFLUX DISEASE WITHOUT ESOPHAGITIS: Primary | ICD-10-CM

## 2022-11-08 DIAGNOSIS — S46.012D TRAUMATIC COMPLETE TEAR OF LEFT ROTATOR CUFF, SUBSEQUENT ENCOUNTER: ICD-10-CM

## 2022-11-08 DIAGNOSIS — E78.00 PURE HYPERCHOLESTEROLEMIA: ICD-10-CM

## 2022-11-08 DIAGNOSIS — E10.65 TYPE 1 DIABETES MELLITUS WITH HYPERGLYCEMIA (H): ICD-10-CM

## 2022-11-08 PROBLEM — E10.39 TYPE 1 DIABETES MELLITUS WITH OPHTHALMIC COMPLICATION (H): Status: ACTIVE | Noted: 2022-03-04

## 2022-11-08 PROCEDURE — 99214 OFFICE O/P EST MOD 30 MIN: CPT | Performed by: INTERNAL MEDICINE

## 2022-11-08 RX ORDER — LEVOTHYROXINE SODIUM 175 UG/1
175 TABLET ORAL DAILY
Qty: 90 TABLET | Refills: 3 | Status: SHIPPED | OUTPATIENT
Start: 2022-11-08 | End: 2023-11-08

## 2022-11-08 RX ORDER — OMEPRAZOLE 20 MG/1
20 TABLET, DELAYED RELEASE ORAL DAILY
COMMUNITY
End: 2022-11-08

## 2022-11-08 RX ORDER — LOSARTAN POTASSIUM 50 MG/1
50 TABLET ORAL DAILY
Qty: 90 TABLET | Refills: 3 | Status: SHIPPED | OUTPATIENT
Start: 2022-11-08 | End: 2023-11-08

## 2022-11-08 RX ORDER — PRAVASTATIN SODIUM 20 MG
20 TABLET ORAL AT BEDTIME
Qty: 90 TABLET | Refills: 3 | Status: SHIPPED | OUTPATIENT
Start: 2022-11-08 | End: 2023-11-08

## 2022-11-08 RX ORDER — OMEPRAZOLE 20 MG/1
20 TABLET, DELAYED RELEASE ORAL DAILY
Qty: 90 TABLET | Refills: 3 | Status: SHIPPED | OUTPATIENT
Start: 2022-11-08 | End: 2023-11-08

## 2022-11-08 NOTE — PROGRESS NOTES
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)     type I DM, controlled, late onset diabetes in adulthood (ROLANDO)  hypoglycemic medications:  insulin therapy:  Tandem pump (initiated in '22; Medtronic prior),  started on Dexcom6 CGM (9/2020), May, 2022 - changed to Tandem G6 sensor with auto-basal features  Reviewed pump and glycemic trend settings -resolution of previous overnight hypoglycemia     last HbA1c: 7.6%   microvascular complications: +retinopathy  macrovascular complications: none  ASA/SIMON/statin:  ASA 81mg daily, pravastatin 20mg qhs         Relevant Medications    levothyroxine (SYNTHROID/LEVOTHROID) 175 MCG tablet    Hypothyroidism due to Hashimoto's thyroiditis    Relevant Medications    levothyroxine (SYNTHROID/LEVOTHROID) 175 MCG tablet    Pure hypercholesterolemia    Relevant Medications    losartan (COZAAR) 50 MG tablet    pravastatin (PRAVACHOL) 20 MG tablet       Musculoskeletal and Integumentary    Traumatic complete tear of left rotator cuff     left shoulder arthroscopic repair of rotator cuff tear, on 5/3/2021 with Dr. Paul  - completed rehab  - significant functional limitations - can no longer swim        Other Visit Diagnoses     Gastroesophageal reflux disease without esophagitis    -  Primary    Relevant Medications    omeprazole (PRILOSEC OTC) 20 MG EC tablet              BMI:   Estimated body mass index is 30.35 kg/m  as calculated from the following:    Height as of this encounter: 1.702 m (5' 7\").    Weight as of this encounter: 87.9 kg (193 lb 12.8 oz).   Weight management plan: Discussed healthy diet and exercise guidelines        Return in about 6 months (around 5/8/2023) for Follow up, with me.    Jose Mcqueen MD  [facility]    Arabella Gayle is a 77 year old, presenting for the following health issues: Presents for general follow-up.  Planning to " "leave for [place] later this week.  Switched over to tandem pump along with his Dexcom G6.  Issues with frequent overnight hypoglycemia which now seems to be well sorted out.  Not swimming any longer.  Resigned to the fact that his shoulder will have significant range of motion limitations.  Diabetes      History of Present Illness       Diabetes:   He presents for follow up of diabetes.  He is checking home blood glucose with a continuous glucose monitor.  He checks blood glucose before meals, after meals, before and after meals and at bedtime.  Blood glucose is sometimes over 200 and sometimes under 70. He is aware of hypoglycemia symptoms including dizziness. He has no concerns regarding his diabetes at this time.  He is not experiencing numbness or burning in feet, excessive thirst, blurry vision, weight changes or redness, sores or blisters on feet.         He eats 4 or more servings of fruits and vegetables daily.He consumes 0 sweetened beverage(s) daily.He exercises with enough effort to increase his heart rate 30 to 60 minutes per day.  He exercises with enough effort to increase his heart rate 6 days per week.   He is taking medications regularly.         Review of Systems   Constitutional, HEENT, cardiovascular, pulmonary, gi and gu systems are negative, except as otherwise noted.      Objective    /78   Pulse 66   Temp 97.5  F (36.4  C)   Resp 18   Ht 1.702 m (5' 7\")   Wt 87.9 kg (193 lb 12.8 oz)   SpO2 98%   BMI 30.35 kg/m    Body mass index is 30.35 kg/m .  Physical Exam   GENERAL: healthy, alert and no distress  NECK: no adenopathy, no asymmetry, masses, or scars and thyroid normal to palpation  RESP: lungs clear to auscultation - no rales, rhonchi or wheezes  CV: regular rate and rhythm, normal S1 S2, no S3 or S4, no murmur, click or rub, no peripheral edema and peripheral pulses strong  ABDOMEN: soft, nontender, no hepatosplenomegaly, no masses and bowel sounds normal  MS: no gross " musculoskeletal defects noted, no edema    Lab on 11/01/2022   Component Date Value Ref Range Status     Hepatitis C Antibody 11/01/2022 Nonreactive  Nonreactive Final     Hemoglobin A1C 11/01/2022 7.6 (H)  0.0 - 5.6 % Final    Normal <5.7%   Prediabetes 5.7-6.4%    Diabetes 6.5% or higher     Note: Adopted from ADA consensus guidelines.       Current Outpatient Medications   Medication     aspirin (ASA) 81 MG EC tablet     blood glucose (NO BRAND SPECIFIED) test strip     Continuous Blood Gluc  (DEXCOM G6 ) MAGUE     Continuous Blood Gluc Sensor (DEXCOM G6 SENSOR) MISC     Continuous Blood Gluc Transmit (DEXCOM G6 TRANSMITTER) MISC     insulin aspart (NOVOLOG VIAL) 100 UNITS/ML vial     levothyroxine (SYNTHROID/LEVOTHROID) 175 MCG tablet     losartan (COZAAR) 50 MG tablet     omeprazole (PRILOSEC OTC) 20 MG EC tablet     pravastatin (PRAVACHOL) 20 MG tablet     No current facility-administered medications for this visit.

## 2022-11-08 NOTE — ASSESSMENT & PLAN NOTE
type I DM, controlled, late onset diabetes in adulthood (ROLANDO)  hypoglycemic medications:  insulin therapy:  Tandem pump (initiated in '22; Medtronic prior),  started on Dexcom6 CGM (9/2020), May, 2022 - changed to Tandem G6 sensor with auto-basal features  Reviewed pump and glycemic trend settings -resolution of previous overnight hypoglycemia     last HbA1c: 7.6%   microvascular complications: +retinopathy  macrovascular complications: none  ASA/SIMON/statin:  ASA 81mg daily, pravastatin 20mg qhs

## 2022-11-08 NOTE — ASSESSMENT & PLAN NOTE
left shoulder arthroscopic repair of rotator cuff tear, on 5/3/2021 with Dr. Paul  - completed rehab  - significant functional limitations - can no longer swim

## 2022-11-18 ENCOUNTER — TELEPHONE (OUTPATIENT)
Dept: FAMILY MEDICINE | Facility: CLINIC | Age: 77
End: 2022-11-18

## 2023-03-20 ENCOUNTER — TELEPHONE (OUTPATIENT)
Dept: FAMILY MEDICINE | Facility: CLINIC | Age: 78
End: 2023-03-20
Payer: COMMERCIAL

## 2023-03-20 DIAGNOSIS — E10.65 TYPE 1 DIABETES MELLITUS WITH HYPERGLYCEMIA (H): ICD-10-CM

## 2023-03-20 NOTE — TELEPHONE ENCOUNTER
Patient needs new prescription for Dexcom G7 sensor    Can you call that to [facility]    Call patient if questions/to advise when done  [phone]  Ok to leave message

## 2023-03-21 NOTE — TELEPHONE ENCOUNTER
Yes, but Dexcom G7 can not yet be integrated with his pump yet (not approved and pump soft wear will likely need to be updated).  I called pt and had to leave a message.  I did not order the G7.

## 2023-03-22 NOTE — TELEPHONE ENCOUNTER
Called and discussed with patient.  Patient will sign up through the tandem website to get an email once the Dexcom G7 is a to be integrated with patient's tandem t:slim insulin pump

## 2023-03-22 NOTE — TELEPHONE ENCOUNTER
Patient call wondering why this was not taken care of. Writer explained that Dr. Mcqueen was out of clinic for the past 2 1/2 weeks and  relayed to patient that Keshia White called and left the below message which was relayed to him by writer.    He is requesting a call back by Rupal as he has some questions he would like answered.

## 2023-04-04 RX ORDER — PROCHLORPERAZINE 25 MG/1
SUPPOSITORY RECTAL
Qty: 3 EACH | Refills: 5 | Status: SHIPPED | OUTPATIENT
Start: 2023-04-04 | End: 2023-06-28

## 2023-04-04 NOTE — TELEPHONE ENCOUNTER
[facility] currently out of stock on Dexcom G6 sensors changed prescription to [facility] per patient request.

## 2023-04-29 ENCOUNTER — HEALTH MAINTENANCE LETTER (OUTPATIENT)
Age: 78
End: 2023-04-29

## 2023-05-16 ENCOUNTER — LAB (OUTPATIENT)
Dept: LAB | Facility: CLINIC | Age: 78
End: 2023-05-16
Payer: COMMERCIAL

## 2023-05-16 DIAGNOSIS — E10.39 TYPE 1 DIABETES MELLITUS WITH OPHTHALMIC COMPLICATION (H): Primary | ICD-10-CM

## 2023-05-16 LAB — HBA1C MFR BLD: 7.6 % (ref 0–5.6)

## 2023-05-16 PROCEDURE — 36415 COLL VENOUS BLD VENIPUNCTURE: CPT

## 2023-05-16 PROCEDURE — 83036 HEMOGLOBIN GLYCOSYLATED A1C: CPT

## 2023-05-23 ENCOUNTER — OFFICE VISIT (OUTPATIENT)
Dept: FAMILY MEDICINE | Facility: CLINIC | Age: 78
End: 2023-05-23
Payer: COMMERCIAL

## 2023-05-23 VITALS
WEIGHT: 194 LBS | BODY MASS INDEX: 30.45 KG/M2 | TEMPERATURE: 97.1 F | HEIGHT: 67 IN | DIASTOLIC BLOOD PRESSURE: 79 MMHG | RESPIRATION RATE: 17 BRPM | SYSTOLIC BLOOD PRESSURE: 163 MMHG | HEART RATE: 67 BPM

## 2023-05-23 DIAGNOSIS — M70.61 TROCHANTERIC BURSITIS OF RIGHT HIP: ICD-10-CM

## 2023-05-23 DIAGNOSIS — E78.00 PURE HYPERCHOLESTEROLEMIA: ICD-10-CM

## 2023-05-23 DIAGNOSIS — Z00.00 HEALTH CARE MAINTENANCE: ICD-10-CM

## 2023-05-23 DIAGNOSIS — E06.3 HYPOTHYROIDISM DUE TO HASHIMOTO'S THYROIDITIS: Primary | ICD-10-CM

## 2023-05-23 DIAGNOSIS — E10.65 TYPE 1 DIABETES MELLITUS WITH HYPERGLYCEMIA (H): ICD-10-CM

## 2023-05-23 DIAGNOSIS — E10.3293 TYPE 1 DIABETES MELLITUS WITH MILD NONPROLIFERATIVE RETINOPATHY OF BOTH EYES WITHOUT MACULAR EDEMA (H): ICD-10-CM

## 2023-05-23 PROCEDURE — 99214 OFFICE O/P EST MOD 30 MIN: CPT | Performed by: INTERNAL MEDICINE

## 2023-05-23 NOTE — PATIENT INSTRUCTIONS
'hip' pains sound more like trochanteric bursitis.  Follow stretching routines we outlined.  If persisting/worsening - consider seeing ortho clinic for potential injection evaluation.

## 2023-05-23 NOTE — ASSESSMENT & PLAN NOTE
CRC screening: -Cologuard positive(5/2019); colonoscopy (6/2019); multiple adenomatous polyps    - last colonoscopy 9/2022: multiple tubular adenomas removed; would plan to repeat in '25   - PSA 0.3 (11/2015) - declines regular future screening   - intentional tremor (worse in AM) - suspect essential tremor; likely worsened by caffeine.  Low suspicions for parkinsons   - COVID vaccination reviewed   - fall, 2021 - shingles episode (had not been vaccinated)  - reminded on future need for TDap

## 2023-05-23 NOTE — PROGRESS NOTES
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)     type I DM, controlled, late onset diabetes in adulthood (ROLANDO)  hypoglycemic medications:  insulin therapy:  Tandem pump (initiated in '22; Medtronic prior),  started on Dexcom6 CGM (9/2020), May, 2022 - changed to Tandem G6 sensor with auto-basal features  Reviewed pump and glycemic trend settings -resolution of previous overnight hypoglycemia     last HbA1c: 7.6%   microvascular complications: +retinopathy  macrovascular complications: none  ASA/SIMON/statin:  ASA 81mg daily, pravastatin 20mg qhs         Hypothyroidism due to Hashimoto's thyroiditis - Primary     Stable levothyroxine dosing  - last TSH, 6/2022         Pure hypercholesterolemia       Other    Health care maintenance     CRC screening: -Cologuard positive(5/2019); colonoscopy (6/2019); multiple adenomatous polyps    - last colonoscopy 9/2022: multiple tubular adenomas removed; would plan to repeat in '25   - PSA 0.3 (11/2015) - declines regular future screening   - intentional tremor (worse in AM) - suspect essential tremor; likely worsened by caffeine.  Low suspicions for parkinsons   - COVID vaccination reviewed   - fall, 2021 - shingles episode (had not been vaccinated)  - reminded on future need for TDap        Other Visit Diagnoses     Type 1 diabetes mellitus with hyperglycemia (H)        Trochanteric bursitis of right hip        Offered stretching exercises; IT band stretching.  If persists, would direct to Ortho for injection consideration              BMI:   Estimated body mass index is 30.38 kg/m  as calculated from the following:    Height as of this encounter: 1.702 m (5' 7\").    Weight as of this encounter: 88 kg (194 lb).   Weight management plan: Discussed healthy diet and exercise guidelines        Jose Mcqueen MD  [facility] " "[place]    Subjective   Irwin is a 78 year old, presenting for the following health issues: Irwin presents for regular follow-up.  Return from [place] in April.  Remains active primarily now with walking.  Unfortunately swimming hindered by previous shoulder injury.  Doing well with sensor and pump; denies any hypoglycemic frequency.  Waiting for availability of G7 sensor when compatible with his current tandem pump.  still has trouble maintaining time in range and later evening hours.  Would ideally like to lose weight, though knows his calorie consumption is an issue for that.  Diabetes        5/23/2023     8:23 AM   Additional Questions   Roomed by Jyoti EDWARDS     History of Present Illness       Diabetes:   He presents for follow up of diabetes.  He is checking home blood glucose with a continuous glucose monitor.  He checks blood glucose before meals, after meals, before and after meals and at bedtime.  Blood glucose is sometimes over 200 and sometimes under 70. He is aware of hypoglycemia symptoms including dizziness. He has no concerns regarding his diabetes at this time.  He is not experiencing numbness or burning in feet, excessive thirst, blurry vision, weight changes or redness, sores or blisters on feet.         He eats 4 or more servings of fruits and vegetables daily.He consumes 0 sweetened beverage(s) daily.He exercises with enough effort to increase his heart rate 30 to 60 minutes per day.  He exercises with enough effort to increase his heart rate 6 days per week.   He is taking medications regularly.         Review of Systems   Constitutional, HEENT, cardiovascular, pulmonary, gi and gu systems are negative, except as otherwise noted.      Objective    BP (!) 146/78   Pulse 67   Temp 97.1  F (36.2  C)   Resp 17   Ht 1.702 m (5' 7\")   Wt 88 kg (194 lb)   BMI 30.38 kg/m    Body mass index is 30.38 kg/m .  Physical Exam   GENERAL: healthy, alert and no distress  NECK: no adenopathy, no " asymmetry, masses, or scars and thyroid normal to palpation  RESP: lungs clear to auscultation - no rales, rhonchi or wheezes  CV: regular rate and rhythm, normal S1 S2, no S3 or S4, no murmur, click or rub, no peripheral edema and peripheral pulses strong  ABDOMEN: soft, nontender, no hepatosplenomegaly, no masses and bowel sounds normal  MS: no gross musculoskeletal defects noted, no edema    Lab on 05/16/2023   Component Date Value Ref Range Status     Hemoglobin A1C 05/16/2023 7.6 (H)  0.0 - 5.6 % Final    Normal <5.7%   Prediabetes 5.7-6.4%    Diabetes 6.5% or higher     Note: Adopted from ADA consensus guidelines.

## 2023-06-16 DIAGNOSIS — E10.9 TYPE 1 DIABETES MELLITUS WITHOUT COMPLICATION (H): ICD-10-CM

## 2023-06-16 RX ORDER — INSULIN ASPART 100 [IU]/ML
INJECTION, SOLUTION INTRAVENOUS; SUBCUTANEOUS
Qty: 90 ML | Refills: 0 | Status: SHIPPED | OUTPATIENT
Start: 2023-06-16 | End: 2023-12-08

## 2023-06-16 NOTE — TELEPHONE ENCOUNTER
Prescription approved per [facility] Refill Protocol.    Last Written Prescription Date: 10/26/22  Last Fill Quantity: 90,  # refills: 0   Last office visit: 5/23/2023

## 2023-06-27 DIAGNOSIS — E10.65 TYPE 1 DIABETES MELLITUS WITH HYPERGLYCEMIA (H): ICD-10-CM

## 2023-06-28 RX ORDER — PROCHLORPERAZINE 25 MG/1
SUPPOSITORY RECTAL
Qty: 3 EACH | Refills: 5 | Status: SHIPPED | OUTPATIENT
Start: 2023-06-28 | End: 2024-03-26

## 2023-06-28 NOTE — TELEPHONE ENCOUNTER
Routing refill request to provider for review/approval because:  LOV 5/23/2023       DOMINIC Moore  [facility]

## 2023-07-26 ENCOUNTER — OFFICE VISIT (OUTPATIENT)
Dept: FAMILY MEDICINE | Facility: CLINIC | Age: 78
End: 2023-07-26
Payer: COMMERCIAL

## 2023-07-26 VITALS
WEIGHT: 194.6 LBS | RESPIRATION RATE: 16 BRPM | SYSTOLIC BLOOD PRESSURE: 122 MMHG | BODY MASS INDEX: 30.54 KG/M2 | HEIGHT: 67 IN | TEMPERATURE: 97.7 F | OXYGEN SATURATION: 96 % | DIASTOLIC BLOOD PRESSURE: 68 MMHG | HEART RATE: 85 BPM

## 2023-07-26 DIAGNOSIS — A69.20 LYME DISEASE: Primary | ICD-10-CM

## 2023-07-26 PROCEDURE — 36415 COLL VENOUS BLD VENIPUNCTURE: CPT

## 2023-07-26 PROCEDURE — 86618 LYME DISEASE ANTIBODY: CPT

## 2023-07-26 PROCEDURE — 99213 OFFICE O/P EST LOW 20 MIN: CPT

## 2023-07-26 RX ORDER — DOXYCYCLINE 100 MG/1
100 CAPSULE ORAL 2 TIMES DAILY
Qty: 14 CAPSULE | Refills: 0 | Status: SHIPPED | OUTPATIENT
Start: 2023-07-26 | End: 2023-08-02

## 2023-07-26 NOTE — PROGRESS NOTES
"  Assessment & Plan     Lyme disease  Patient spent 1 week in [place] and since his return has had aching in joints. No fevers. Had two separate bites on posterior neck from unknown insect. Will treat empirically  - Lyme Disease Total Abs Bld with Reflex to Confirm CLIA; Future  - doxycycline hyclate (VIBRAMYCIN) 100 MG capsule; Take 1 capsule (100 mg) by mouth 2 times daily for 7 days  - Lyme Disease Total Abs Bld with Reflex to Confirm CLIA                 SOLOMON Comer CNP  [facility]    Arabella Gayle is a 78 year old, presenting for the following health issues:  Joint Pain (Lyme's disease concern. Joint pain x 2 weeks )      7/26/2023     3:22 PM   Additional Questions   Roomed by AJ English       Was in the [facility] 2 weeks ago for 1 week. Has had joint pain since that time. Had lyme disease 20 years ago.     History of Present Illness       Reason for visit:  Lyme Disease    He eats 2-3 servings of fruits and vegetables daily.He consumes 0 sweetened beverage(s) daily.He exercises with enough effort to increase his heart rate 30 to 60 minutes per day.  He exercises with enough effort to increase his heart rate 5 days per week.   He is taking medications regularly.               Review of Systems   Constitutional, HEENT, cardiovascular, pulmonary, gi and gu systems are negative, except as otherwise noted.      Objective    BP (!) 147/78 (BP Location: Right arm, Patient Position: Sitting, Cuff Size: Adult Regular)   Pulse 85   Temp 97.7  F (36.5  C) (Tympanic)   Resp 16   Ht 1.702 m (5' 7\")   Wt 88.3 kg (194 lb 9.6 oz)   SpO2 96%   BMI 30.48 kg/m    Body mass index is 30.48 kg/m .  Physical Exam  Constitutional:       Appearance: Normal appearance.   HENT:      Head: Normocephalic.      Mouth/Throat:      Mouth: Mucous membranes are moist.      Pharynx: Oropharynx is clear.   Eyes:      Extraocular Movements: Extraocular movements intact.      " Conjunctiva/sclera: Conjunctivae normal.   Pulmonary:      Effort: Pulmonary effort is normal.   Musculoskeletal:         General: Normal range of motion.      Cervical back: Normal range of motion.   Skin:     General: Skin is warm and dry.      Capillary Refill: Capillary refill takes less than 2 seconds.   Neurological:      Mental Status: He is alert and oriented to person, place, and time.

## 2023-07-27 LAB — B BURGDOR IGG+IGM SER QL: 0.21

## 2023-08-08 DIAGNOSIS — E10.9 TYPE 1 DIABETES MELLITUS WITHOUT COMPLICATION (H): ICD-10-CM

## 2023-08-08 RX ORDER — PROCHLORPERAZINE 25 MG/1
SUPPOSITORY RECTAL
Qty: 9 EACH | Refills: 5 | Status: SHIPPED | OUTPATIENT
Start: 2023-08-08

## 2023-08-08 NOTE — TELEPHONE ENCOUNTER
"Routing refill request to provider for review/approval because:  A break in medication    Last Written Prescription Date:  10/26/22  Last Fill Quantity: 3,  # refills: 1   Last office visit provider:   7/26/23 with [facility]    Requested Prescriptions   Pending Prescriptions Disp Refills    Continuous Blood Gluc Transmit (DEXCOM G6 TRANSMITTER) MISC [Pharmacy Med Name: DEXCOM G6 TRANSMITTER TRANSMIT MISC]  1     Sig: CHANGE SENSOR EVERY 10 DAYS       Diabetic Supplies Protocol Passed - 8/8/2023  2:10 PM        Passed - Medication is active on med list        Passed - Patient is 18 years of age or older        Passed - Recent (6 mo) or future (30 days) visit within the authorizing provider's specialty     Patient had office visit in the last 6 months or has a visit in the next 30 days with authorizing provider.  See \"Patient Info\" tab in inbasket, or \"Choose Columns\" in Meds & Orders section of the refill encounter.                 VIVIANA SPIVEY RN 08/08/23 2:10 PM  "

## 2023-09-28 ENCOUNTER — TRANSFERRED RECORDS (OUTPATIENT)
Dept: HEALTH INFORMATION MANAGEMENT | Facility: CLINIC | Age: 78
End: 2023-09-28
Payer: COMMERCIAL

## 2023-09-28 LAB — RETINOPATHY: POSITIVE

## 2023-10-01 ENCOUNTER — HEALTH MAINTENANCE LETTER (OUTPATIENT)
Age: 78
End: 2023-10-01

## 2023-11-06 ENCOUNTER — LAB (OUTPATIENT)
Dept: LAB | Facility: CLINIC | Age: 78
End: 2023-11-06
Payer: COMMERCIAL

## 2023-11-06 DIAGNOSIS — E06.3 HYPOTHYROIDISM DUE TO HASHIMOTO'S THYROIDITIS: ICD-10-CM

## 2023-11-06 DIAGNOSIS — E10.3293 TYPE 1 DIABETES MELLITUS WITH MILD NONPROLIFERATIVE RETINOPATHY OF BOTH EYES WITHOUT MACULAR EDEMA (H): Primary | ICD-10-CM

## 2023-11-06 LAB
ANION GAP SERPL CALCULATED.3IONS-SCNC: 10 MMOL/L (ref 7–15)
BUN SERPL-MCNC: 12.5 MG/DL (ref 8–23)
CALCIUM SERPL-MCNC: 9.4 MG/DL (ref 8.8–10.2)
CHLORIDE SERPL-SCNC: 102 MMOL/L (ref 98–107)
CHOLEST SERPL-MCNC: 194 MG/DL
CREAT SERPL-MCNC: 0.94 MG/DL (ref 0.67–1.17)
CREAT UR-MCNC: 107 MG/DL
DEPRECATED HCO3 PLAS-SCNC: 25 MMOL/L (ref 22–29)
EGFRCR SERPLBLD CKD-EPI 2021: 83 ML/MIN/1.73M2
GLUCOSE SERPL-MCNC: 171 MG/DL (ref 70–99)
HBA1C MFR BLD: 7.9 % (ref 0–5.6)
HDLC SERPL-MCNC: 78 MG/DL
LDLC SERPL CALC-MCNC: 104 MG/DL
MICROALBUMIN UR-MCNC: 44.8 MG/L
MICROALBUMIN/CREAT UR: 41.87 MG/G CR (ref 0–17)
NONHDLC SERPL-MCNC: 116 MG/DL
POTASSIUM SERPL-SCNC: 5 MMOL/L (ref 3.4–5.3)
SODIUM SERPL-SCNC: 137 MMOL/L (ref 135–145)
TRIGL SERPL-MCNC: 60 MG/DL
TSH SERPL DL<=0.005 MIU/L-ACNC: 3.11 UIU/ML (ref 0.3–4.2)

## 2023-11-06 PROCEDURE — 82570 ASSAY OF URINE CREATININE: CPT

## 2023-11-06 PROCEDURE — 80048 BASIC METABOLIC PNL TOTAL CA: CPT

## 2023-11-06 PROCEDURE — 83036 HEMOGLOBIN GLYCOSYLATED A1C: CPT

## 2023-11-06 PROCEDURE — 36415 COLL VENOUS BLD VENIPUNCTURE: CPT

## 2023-11-06 PROCEDURE — 80061 LIPID PANEL: CPT

## 2023-11-06 PROCEDURE — 82043 UR ALBUMIN QUANTITATIVE: CPT

## 2023-11-06 PROCEDURE — 84443 ASSAY THYROID STIM HORMONE: CPT

## 2023-11-07 DIAGNOSIS — E78.00 PURE HYPERCHOLESTEROLEMIA: ICD-10-CM

## 2023-11-08 ENCOUNTER — OFFICE VISIT (OUTPATIENT)
Dept: FAMILY MEDICINE | Facility: CLINIC | Age: 78
End: 2023-11-08
Payer: COMMERCIAL

## 2023-11-08 VITALS
HEIGHT: 67 IN | OXYGEN SATURATION: 97 % | SYSTOLIC BLOOD PRESSURE: 136 MMHG | DIASTOLIC BLOOD PRESSURE: 83 MMHG | BODY MASS INDEX: 30.13 KG/M2 | RESPIRATION RATE: 16 BRPM | TEMPERATURE: 97.6 F | WEIGHT: 192 LBS | HEART RATE: 72 BPM

## 2023-11-08 DIAGNOSIS — E78.00 PURE HYPERCHOLESTEROLEMIA: ICD-10-CM

## 2023-11-08 DIAGNOSIS — K21.9 GASTROESOPHAGEAL REFLUX DISEASE WITHOUT ESOPHAGITIS: ICD-10-CM

## 2023-11-08 DIAGNOSIS — Z00.00 HEALTH CARE MAINTENANCE: ICD-10-CM

## 2023-11-08 DIAGNOSIS — E10.3293 TYPE 1 DIABETES MELLITUS WITH MILD NONPROLIFERATIVE RETINOPATHY OF BOTH EYES WITHOUT MACULAR EDEMA (H): Primary | ICD-10-CM

## 2023-11-08 DIAGNOSIS — Z29.11 NEED FOR VACCINATION AGAINST RESPIRATORY SYNCYTIAL VIRUS: ICD-10-CM

## 2023-11-08 DIAGNOSIS — E06.3 HYPOTHYROIDISM DUE TO HASHIMOTO'S THYROIDITIS: ICD-10-CM

## 2023-11-08 PROCEDURE — G0008 ADMIN INFLUENZA VIRUS VAC: HCPCS | Performed by: INTERNAL MEDICINE

## 2023-11-08 PROCEDURE — 99213 OFFICE O/P EST LOW 20 MIN: CPT | Mod: 25 | Performed by: INTERNAL MEDICINE

## 2023-11-08 PROCEDURE — 90662 IIV NO PRSV INCREASED AG IM: CPT | Performed by: INTERNAL MEDICINE

## 2023-11-08 RX ORDER — LOSARTAN POTASSIUM 50 MG/1
50 TABLET ORAL DAILY
Qty: 90 TABLET | Refills: 3 | Status: SHIPPED | OUTPATIENT
Start: 2023-11-08 | End: 2024-04-26

## 2023-11-08 RX ORDER — PRAVASTATIN SODIUM 20 MG
20 TABLET ORAL AT BEDTIME
Qty: 90 TABLET | Refills: 3 | Status: SHIPPED | OUTPATIENT
Start: 2023-11-08 | End: 2024-04-26

## 2023-11-08 RX ORDER — LEVOTHYROXINE SODIUM 175 UG/1
175 TABLET ORAL DAILY
Qty: 90 TABLET | Refills: 3 | Status: SHIPPED | OUTPATIENT
Start: 2023-11-08 | End: 2024-04-26

## 2023-11-08 RX ORDER — OMEPRAZOLE 20 MG/1
20 TABLET, DELAYED RELEASE ORAL DAILY
Qty: 90 TABLET | Refills: 3 | Status: SHIPPED | OUTPATIENT
Start: 2023-11-08 | End: 2024-04-26

## 2023-11-08 RX ORDER — LOSARTAN POTASSIUM 50 MG/1
TABLET ORAL
Qty: 90 TABLET | Refills: 3 | OUTPATIENT
Start: 2023-11-08

## 2023-11-08 RX ORDER — RESPIRATORY SYNCYTIAL VIRUS VACCINE 120MCG/0.5
0.5 KIT INTRAMUSCULAR ONCE
Qty: 1 EACH | Refills: 0 | Status: CANCELLED | OUTPATIENT
Start: 2023-11-08 | End: 2023-11-08

## 2023-11-08 NOTE — ASSESSMENT & PLAN NOTE
type I DM, controlled, late onset diabetes in adulthood (ROLANDO)  hypoglycemic medications:  insulin therapy:  Tandem pump (initiated in '22; [employer]tr[employer] prior),  started on Dexcom6 CGM (9/2020), May, 2022 - changed to Tandem G6 sensor with auto-basal features  Reviewed pump and glycemic trend settings -resolution of previous overnight hypoglycemia     last HbA1c: 7.9%   microvascular complications: +retinopathy, + BHARATH x1  macrovascular complications: none  ASA/SIMON/statin:  ASA 81mg daily, losartan 50mg daily, pravastatin 20mg at bedtime    Patient has met criteria for Continuous Glucose Monitoring (CGM) coverage:   - Patient has type one diabetes mellitus; and   - patient has been using a blood glucose monitor and performing four or more blood glucose test per day; and   - patient is insulin treated with multiple daily injections; and   - patient is requiring frequent insulin adjustments on the basis of blood glucose readings.   - Within six months prior to ordering the CGM, the patient has had an in-person visit with the practitioner; and determined that criteria (1-4) above are met; and   - every six months following the initial prescription of the CGM, the treating practitioner has an in-person visit with the patient to assess adherence to their CGM regimen and diabetes treatment plan.

## 2023-11-08 NOTE — ASSESSMENT & PLAN NOTE
CRC screening: -Cologuard positive(5/2019); colonoscopy (6/2019); multiple adenomatous polyps    - last colonoscopy 9/2022: multiple tubular adenomas removed; would plan to repeat in '25   - PSA 0.3 (11/2015) - declines regular future screening   - intentional tremor (worse in AM) - suspect essential tremor; likely worsened by caffeine.  Low suspicions for parkinsons   - fall, 2021 - shingles episode (had not been vaccinated)  - reminded on future need for Tdap  Adult vaccinations discussed and updated accordingly   none

## 2023-11-08 NOTE — PROGRESS NOTES
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 (H) - Primary     type I DM, controlled, late onset diabetes in adulthood (ROLANDO)  hypoglycemic medications:  insulin therapy:  Tandem pump (initiated in '22; [employer] prior),  started on Dexcom6 CGM (9/2020), May, 2022 - changed to Tandem G6 sensor with auto-basal features  Reviewed pump and glycemic trend settings -resolution of previous overnight hypoglycemia     last HbA1c: 7.9%   microvascular complications: +retinopathy, + BHARATH x1  macrovascular complications: none  ASA/SIMON/statin:  ASA 81mg daily, losartan 50mg daily, pravastatin 20mg at bedtime    Patient has met criteria for Continuous Glucose Monitoring (CGM) coverage:   - Patient has type one diabetes mellitus; and   - patient has been using a blood glucose monitor and performing four or more blood glucose test per day; and   - patient is insulin treated with multiple daily injections; and   - patient is requiring frequent insulin adjustments on the basis of blood glucose readings.   - Within six months prior to ordering the CGM, the patient has had an in-person visit with the practitioner; and determined that criteria (1-4) above are met; and   - every six months following the initial prescription of the CGM, the treating practitioner has an in-person visit with the patient to assess adherence to their CGM regimen and diabetes treatment plan.              Relevant Medications    levothyroxine (SYNTHROID/LEVOTHROID) 175 MCG tablet    Hypothyroidism due to Hashimoto's thyroiditis    Relevant Medications    levothyroxine (SYNTHROID/LEVOTHROID) 175 MCG tablet    Pure hypercholesterolemia    Relevant Medications    losartan (COZAAR) 50 MG tablet    pravastatin (PRAVACHOL) 20 MG tablet       Other    Health care maintenance     CRC screening: -Malcolm positive(5/2019);  colonoscopy (6/2019); multiple adenomatous polyps    - last colonoscopy 9/2022: multiple tubular adenomas removed; would plan to repeat in '25   - PSA 0.3 (11/2015) - declines regular future screening   - intentional tremor (worse in AM) - suspect essential tremor; likely worsened by caffeine.  Low suspicions for parkinsons   - fall, 2021 - shingles episode (had not been vaccinated)  - reminded on future need for Tdap  Adult vaccinations discussed and updated accordingly          Other Visit Diagnoses       Need for vaccination against respiratory syncytial virus        Gastroesophageal reflux disease without esophagitis        Relevant Medications    omeprazole (PRILOSEC OTC) 20 MG EC tablet                    FUTURE APPOINTMENTS:       - Follow-up visit in 6 months    Jose Mcqueen MD  [facility]    Arabella Gayle is a 78 year old, presenting for the following health issues: Returns for regular diabetic follow-up.  In between his stays in [place], will be returning after the holidays.  Stable overall diabetic course.  Doing well with insulin pump sensor; denies any hypoglycemic episodes.  Bothered by some degree of weight gains within the last 2 years.  [place] this summer.  Some Lyme's disease concerns though negative testing no significant symptom change with doxycycline use.  Reviewed recent labs.  Shingles episode this past calendar year  Diabetes and Hypertension      11/8/2023     8:33 AM   Additional Questions   Roomed by srud   Accompanied by n/a       History of Present Illness       Diabetes:   He presents for follow up of diabetes.   He is checking home blood glucose with a continuous glucose monitor.   He checks blood glucose before meals, after meals and at bedtime.  Blood glucose is sometimes over 200 and sometimes under 70. He is aware of hypoglycemia symptoms including dizziness.    He has no concerns regarding his diabetes at this time.   He  "is not experiencing numbness or burning in feet, excessive thirst, blurry vision, weight changes or redness, sores or blisters on feet. The patient has had a diabetic eye exam in the last 12 months. Eye exam performed on Sept 2023. Location of last eye exam [facility].        Hyperlipidemia:  He presents for follow up of hyperlipidemia.   He is taking medication to lower cholesterol. He is not having myalgia or other side effects to statin medications.    Hypertension: He presents for follow up of hypertension.  He does not check blood pressure  regularly outside of the clinic. Outpatient blood pressures have not been over 140/90. He does not follow a low salt diet.     Hypothyroidism:     Since last visit, patient describes the following symptoms::  None    Weight gain::  No weight gain    Weight loss::  No weight loss    He eats 2-3 servings of fruits and vegetables daily.He consumes 0 sweetened beverage(s) daily.He exercises with enough effort to increase his heart rate 30 to 60 minutes per day.  He exercises with enough effort to increase his heart rate 7 days per week.   He is taking medications regularly.         Review of Systems   Constitutional, HEENT, cardiovascular, pulmonary, gi and gu systems are negative, except as otherwise noted.      Objective    /83 (BP Location: Right arm, Patient Position: Sitting)   Pulse 72   Temp 97.6  F (36.4  C) (Tympanic)   Resp 16   Ht 1.702 m (5' 7\")   Wt 87.1 kg (192 lb)   SpO2 97%   BMI 30.07 kg/m    Body mass index is 30.07 kg/m .  Physical Exam   GENERAL: healthy, alert and no distress  NECK: no adenopathy, no asymmetry, masses, or scars and thyroid normal to palpation  RESP: lungs clear to auscultation - no rales, rhonchi or wheezes  CV: regular rate and rhythm, normal S1 S2, no S3 or S4, no murmur, click or rub, no peripheral edema and peripheral pulses strong  ABDOMEN: soft, nontender, no hepatosplenomegaly, no masses and bowel sounds " normal  MS: no gross musculoskeletal defects noted, no edema    Lab on 11/06/2023   Component Date Value Ref Range Status    Sodium 11/06/2023 137  135 - 145 mmol/L Final    Reference intervals for this test were updated on 09/26/2023 to more accurately reflect our healthy population. There may be differences in the flagging of prior results with similar values performed with this method. Interpretation of those prior results can be made in the context of the updated reference intervals.     Potassium 11/06/2023 5.0  3.4 - 5.3 mmol/L Final    Chloride 11/06/2023 102  98 - 107 mmol/L Final    Carbon Dioxide (CO2) 11/06/2023 25  22 - 29 mmol/L Final    Anion Gap 11/06/2023 10  7 - 15 mmol/L Final    Urea Nitrogen 11/06/2023 12.5  8.0 - 23.0 mg/dL Final    Creatinine 11/06/2023 0.94  0.67 - 1.17 mg/dL Final    GFR Estimate 11/06/2023 83  >60 mL/min/1.73m2 Final    Calcium 11/06/2023 9.4  8.8 - 10.2 mg/dL Final    Glucose 11/06/2023 171 (H)  70 - 99 mg/dL Final    Cholesterol 11/06/2023 194  <200 mg/dL Final    Triglycerides 11/06/2023 60  <150 mg/dL Final    Direct Measure HDL 11/06/2023 78  >=40 mg/dL Final    LDL Cholesterol Calculated 11/06/2023 104 (H)  <=100 mg/dL Final    Non HDL Cholesterol 11/06/2023 116  <130 mg/dL Final    Creatinine Urine mg/dL 11/06/2023 107.0  mg/dL Final    The reference ranges have not been established in urine creatinine. The results should be integrated into the clinical context for interpretation.    Albumin Urine mg/L 11/06/2023 44.8  mg/L Final    The reference ranges have not been established in urine albumin. The results should be integrated into the clinical context for interpretation.    Albumin Urine mg/g Cr 11/06/2023 41.87 (H)  0.00 - 17.00 mg/g Cr Final    Microalbuminuria is defined as an albumin:creatinine ratio of 17 to 299 for males and 25 to 299 for females. A ratio of albumin:creatinine of 300 or higher is indicative of overt proteinuria.  Due to biologic variability,  positive results should be confirmed by a second, first-morning random or 24-hour timed urine specimen. If there is discrepancy, a third specimen is recommended. When 2 out of 3 results are in the microalbuminuria range, this is evidence for incipient nephropathy and warrants increased efforts at glucose control, blood pressure control, and institution of therapy with an angiotensin-converting-enzyme (ACE) inhibitor (if the patient can tolerate it).      TSH 11/06/2023 3.11  0.30 - 4.20 uIU/mL Final    Hemoglobin A1C 11/06/2023 7.9 (H)  0.0 - 5.6 % Final    Normal <5.7%   Prediabetes 5.7-6.4%    Diabetes 6.5% or higher     Note: Adopted from ADA consensus guidelines.

## 2023-11-15 ENCOUNTER — OFFICE VISIT (OUTPATIENT)
Dept: EDUCATION SERVICES | Facility: CLINIC | Age: 78
End: 2023-11-15
Payer: COMMERCIAL

## 2023-11-15 DIAGNOSIS — E10.9 TYPE 1 DIABETES MELLITUS (H): Primary | ICD-10-CM

## 2023-11-15 PROCEDURE — G0108 DIAB MANAGE TRN  PER INDIV: HCPCS | Performed by: DIETITIAN, REGISTERED

## 2023-11-15 NOTE — LETTER
11/15/2023         RE: Irwin Malloy  [address] [id number]        Dear Colleague,    Thank you for referring your patient, Irwin Malloy, to the [facility]. Please see a copy of my visit note below.    Diabetes Self-Management Education & Support    Presents for: Insulin Pump and CGM Review for Type 1 Diabetes    Type of Service: In Person Visit      ASSESSMENT:  Pt using Tandem X2 with control IQ technology.  Pt will be changing from Dexcom G6 to Dexcom G7 when integrated with Tandem pump.    [place].    Pt has an improved understanding of bolusing prior to meals and has learned ~30 min prior to meals helps significantly decrease post prandial hyperglycemia     Patient's most recent   Lab Results   Component Value Date    A1C 7.9 11/06/2023     is meeting goal of <8.0    Diabetes knowledge and skills assessment:   Patient is knowledgeable in diabetes management concepts related to: Healthy Eating, Being Active, Monitoring, Taking Medication, Problem Solving, Reducing Risks, Healthy Coping, and Insulin Pump Concepts Balancing glucose and insulin  Carbohydrate counting  Calculating boluses  Problem solving with insulin pump therapy (BG monitoring; hypoglycemia signs/symptoms, treatment (glucagon) and prevention; hyperglycemia signs/symptoms, treatment and prevention; ketones, DKA signs/symptoms and prevention)  Hands on practice with basic pump button use    Continue education with the following diabetes management concepts: Healthy Eating, Taking Medication, Reducing Risks, and Insulin Pump Concepts adjusting settings     Based on learning assessment above, most appropriate setting for further diabetes education would be: Individual setting.      PLAN  Continue to bolus ~30 min prior to meals if glucose is >120mg/dL    Adjust pump setting and continue to adjust as needed to maintain glucose control 70 - 180 goal >70% of  "the time    Topics to cover at upcoming visits: Taking Medication, Problem Solving, and Insulin Pump Concepts adjusting settings as needed    Follow-up: 4 months    See Care Plan for co-developed, patient-state behavior change goals.  AVS provided for patient today.    Education Materials Provided:  Goals for Your Diabetes Care and Carbohydrate Counting      SUBJECTIVE/OBJECTIVE:  Presents for: Insulin Pump and CGM Review  Accompanied by: Self  Diabetes education in the past 24mo: Yes  Focus of Visit: Insulin Pump  Diabetes type: Type 1  Disease course: Stable  How confident are you filling out medical forms by yourself:: Extremely  Transportation concerns: No  Difficulty affording diabetes medication?: No  Difficulty affording diabetes testing supplies?: No  Cultural Influences/Ethnic Background:  Not  or     Diabetes Symptoms & Complications:  Fatigue: No  Neuropathy: No  Polydipsia: No  Polyphagia: No  Polyuria: No  Visual change: No  Slow healing wounds: No  Autonomic neuropathy: No  CVA: No  Heart disease: No  Nephropathy: No  Peripheral neuropathy: No  Peripheral Vascular Disease: No  Retinopathy: No  Sexual dysfunction: No    Patient Problem List and Family Medical History reviewed for relevant medical history, current medical status, and diabetes risk factors.    Vitals:  Ht 1.702 m (5' 7\")   Wt 87.5 kg (193 lb)   BMI 30.23 kg/m    Estimated body mass index is 30.23 kg/m  as calculated from the following:    Height as of this encounter: 1.702 m (5' 7\").    Weight as of this encounter: 87.5 kg (193 lb).   Last 3 BP:   BP Readings from Last 3 Encounters:   11/08/23 136/83   07/26/23 122/68   05/23/23 (!) 163/79       History   Smoking Status     Former     Types: Cigarettes     Quit date: 1/1/2008   Smokeless Tobacco     Never       Labs:  Lab Results   Component Value Date    A1C 7.9 11/06/2023     Lab Results   Component Value Date     11/06/2023     08/19/2021     Lab Results " "  Component Value Date     11/06/2023     Direct Measure HDL   Date Value Ref Range Status   11/06/2023 78 >=40 mg/dL Final   ]  GFR Estimate   Date Value Ref Range Status   11/06/2023 83 >60 mL/min/1.73m2 Final     No results found for: \"GFRESTBLACK\"  Lab Results   Component Value Date    CR 0.94 11/06/2023     No results found for: \"MICROALBUMIN\"    Healthy Eating:  Cultural/Sabianist diet restrictions?: No  Meal planning/habits: Carb counting, Heart healthy, Smaller portions  How many times a week on average do you eat food made away from home (restaurant/take-out)?: 2  Meals include: Breakfast, Lunch, Dinner, Evening Snack  Beverages: Water, Coffee, Milk, Diet soda    Being Active:  Days per week of moderate to strenuous exercise (like a brisk walk): 7  On average, minutes per day of exercise at this level: 40  How intense was your typical exercise? : Moderate (like brisk walking)  Exercise Minutes per Week: 280  Barrier to exercise: None    Monitoring:  Blood Glucose Meter: ContourNext, CGM  Times checking blood sugar at home (number): 5+  Times checking blood sugar at home (per): Day  Blood glucose trend: Fluctuating                Basal Rate  11:00 am changed from 0.500 to 0.45  8:00pm time change to 7:00pm    Taking Medications:  Diabetes Medication(s)       Insulin       insulin aspart (NOVOLOG VIAL) 100 UNITS/ML vial    INJECT UP TO 70 UNITS DAILY VIA PUMP- DX E10.9 STRENGTH: 100 UNITS            Current Treatments: Insulin Pump  Dose schedule: Pre-breakfast, Pre-lunch, Pre-dinner  Given by: Patient  Injection/Infusion sites: Abdomen    Problem Solving:                 Reducing Risks:  CAD Risks: Diabetes Mellitus, Hypertension  Has dilated eye exam at least once a year?: Yes  Sees dentist every 6 months?: Yes  Feet checked by healthcare provider in the last year?: Yes    Healthy Coping:  Informal Support system:: [place] based, Family, Friends, Neighbors, Spouse  Patient Activation Measure Survey " Score:       No data to display                  Care Plan and Education Provided:  Care Plan: Diabetes   Updates made by Rupal White RD since 11/19/2023 12:00 AM        Problem: HbA1C Not In Goal         Goal: Establish Regular Follow-Ups with PCP         Task: Discuss with PCP the recommended timing for patient's next follow up visit(s)    Responsible User: Rupal White RD        Task: Discuss schedule for PCP visits with patient Completed 11/19/2023   Responsible User: Rupal White RD        Goal: Get HbA1C Level in Goal         Task: Educate patient on diabetes education self-management topics Completed 11/19/2023   Responsible User: Rupal White RD        Task: Educate patient on benefits of regular glucose monitoring    Responsible User: Rupal White RD        Task: Refer patient to appropriate extended care team member, as needed (Medication Therapy Management, Behavioral Health, Physical Therapy, etc.)    Responsible User: Rupal White RD        Task: Discuss diabetes treatment plan with patient Completed 11/19/2023   Responsible User: Rupal White RD        Problem: Diabetes Self-Management Education Needed to Optimize Self-Care Behaviors         Goal: Understand diabetes pathophysiology and disease progression         Task: Provide education on diabetes pathophysiology and disease progression specfic to patient's diabetes type    Responsible User: Rupal White RD        Goal: Healthy Eating - follow a healthy eating pattern for diabetes         Task: Provide education on portion control and consistency in amount, composition and timing of food intake    Responsible User: Rupal White RD        Task: Provide education on managing carbohydrate intake (carbohydrate counting, plate planning method, etc.) Completed 11/19/2023   Responsible User: Rupal White RD        Task: Provide education on weight management Completed 11/19/2023   Responsible User: Rupal White RD         Task: Provide education on heart healthy eating Completed 11/19/2023   Responsible User: Rupal White RD        Task: Provide education on eating out    Responsible User: Rupal White RD        Task: Develop individualized healthy eating plan with patient    Responsible User: Rupal White RD        Goal: Being Active - get regular physical activity, working up to at least 150 minutes per week         Task: Provide education on relationship of activity to glucose and precautions to take if at risk for low glucose Completed 11/19/2023   Responsible User: Rupal White RD        Task: Discuss barriers to physical activity with patient    Responsible User: Rupal White RD        Task: Develop physical activity plan with patient    Responsible User: Rupal White RD        Task: Explore community resources including walking groups, assistance programs, and home videos    Responsible User: Rupal White RD        Goal: Monitoring - monitor glucose and ketones as directed    This Visit's Progress: 100%   Note:    Pt to use Dexcom sensor and BG testing as a back up       Task: Provide education on blood glucose monitoring (purpose, proper technique, frequency, glucose targets, interpreting results, when to use glucose control solution, sharps disposal)    Responsible User: Rupal White RD        Task: Provide education on continuous glucose monitoring (sensor placement, use of varsha or /reader, understanding glucose trends, alerts and alarms, differences between sensor glucose and blood glucose) Completed 11/19/2023   Responsible User: Rupal White RD        Task: Provide education on ketone monitoring (when to monitor, frequency, etc.)    Responsible User: Rupal White RD        Goal: Taking Medication - patient is consistently taking medications as directed         Task: Provide education on action of prescribed medication, including when to take and possible side effects Completed  11/19/2023   Responsible User: Rupal White RD        Task: Provide education on insulin and injectable diabetes medications, including administration, storage, site selection and rotation for injection sites Completed 11/19/2023   Responsible User: Rupal White RD        Task: Discuss barriers to medication adherence with patient and provide management technique ideas as appropriate    Responsible User: Rupal White RD        Task: Provide education on frequency and refill details of medications    Responsible User: Rupal White RD        Goal: Problem Solving - know how to prevent and manage short-term diabetes complications         Task: Provide education on high blood glucose - causes, signs/symptoms, prevention and treatment Completed 11/19/2023   Responsible User: Rupal White RD        Task: Provide education on low blood glucose - causes, signs/symptoms, prevention, treatment, carrying a carbohydrate source at all times, and medical identification Completed 11/19/2023   Responsible User: Rupal White RD        Task: Provide education on safe travel with diabetes    Responsible User: Rupal White RD        Task: Provide education on how to care for diabetes on sick days Completed 11/19/2023   Responsible User: Rupal White RD        Task: Provide education on when to call a health care provider    Responsible User: Rupal White RD        Goal: Reducing Risks - know how to prevent and treat long-term diabetes complications         Task: Provide education on major complications of diabetes, prevention, early diagnostic measures and treatment of complications    Responsible User: Rupal White RD        Task: Provide education on recommended care for dental, eye and foot health Completed 11/19/2023   Responsible User: Rupal White RD        Task: Provide education on Hemoglobin A1c - goals and relationship to blood glucose levels Completed 11/19/2023   Responsible User: Christopher  WYATT Goldsmith        Task: Provide education on recommendations for heart health - lipid levels and goals, blood pressure and goals, and aspirin therapy, if indicated    Responsible User: Rupal White RD        Task: Provide education on tobacco cessation    Responsible User: Rupal White RD        Goal: Healthy Coping - use available resources to cope with the challenges of managing diabetes         Task: Discuss recognizing feelings about having diabetes    Responsible User: Rupal White RD        Task: Provide education on the benefits of making appropriate lifestyle changes Completed 11/19/2023   Responsible User: Rupal White RD        Task: Provide education on benefits of utilizing support systems    Responsible User: Rupal White RD        Task: Discuss methods for coping with stress    Responsible User: Rupal White RD        Task: Provide education on when to seek professional counseling    Responsible User: Rupal White RD            Time Spent: 60 minutes  Encounter Type: Individual    Any diabetes medication dose changes were made via the CDE Protocol per the patient's referring provider. A copy of this encounter was shared with the provider.

## 2023-11-19 VITALS — BODY MASS INDEX: 30.29 KG/M2 | WEIGHT: 193 LBS | HEIGHT: 67 IN

## 2023-11-20 NOTE — PROGRESS NOTES
Diabetes Self-Management Education & Support    Presents for: Insulin Pump and CGM Review for Type 1 Diabetes    Type of Service: In Person Visit      ASSESSMENT:  Pt using Tandem X2 with control IQ technology.  Pt will be changing from Dexcom G6 to Dexcom G7 when integrated with Tandem pump.    [place]    Pt has an improved understanding of bolusing prior to meals and has learned ~30 min prior to meals helps significantly decrease post prandial hyperglycemia     Patient's most recent   Lab Results   Component Value Date    A1C 7.9 11/06/2023     is meeting goal of <8.0    Diabetes knowledge and skills assessment:   Patient is knowledgeable in diabetes management concepts related to: Healthy Eating, Being Active, Monitoring, Taking Medication, Problem Solving, Reducing Risks, Healthy Coping, and Insulin Pump Concepts Balancing glucose and insulin  Carbohydrate counting  Calculating boluses  Problem solving with insulin pump therapy (BG monitoring; hypoglycemia signs/symptoms, treatment (glucagon) and prevention; hyperglycemia signs/symptoms, treatment and prevention; ketones, DKA signs/symptoms and prevention)  Hands on practice with basic pump button use    Continue education with the following diabetes management concepts: Healthy Eating, Taking Medication, Reducing Risks, and Insulin Pump Concepts adjusting settings     Based on learning assessment above, most appropriate setting for further diabetes education would be: Individual setting.      PLAN  Continue to bolus ~30 min prior to meals if glucose is >120mg/dL    Adjust pump setting and continue to adjust as needed to maintain glucose control 70 - 180 goal >70% of the time    Topics to cover at upcoming visits: Taking Medication, Problem Solving, and Insulin Pump Concepts adjusting settings as needed    Follow-up: 4 months    See Care Plan for co-developed, patient-state behavior change goals.  AVS provided for patient  "today.    Education Materials Provided:  Goals for Your Diabetes Care and Carbohydrate Counting      SUBJECTIVE/OBJECTIVE:  Presents for: Insulin Pump and CGM Review  Accompanied by: Self  Diabetes education in the past 24mo: Yes  Focus of Visit: Insulin Pump  Diabetes type: Type 1  Disease course: Stable  How confident are you filling out medical forms by yourself:: Extremely  Transportation concerns: No  Difficulty affording diabetes medication?: No  Difficulty affording diabetes testing supplies?: No  Cultural Influences/Ethnic Background:  Not  or     Diabetes Symptoms & Complications:  Fatigue: No  Neuropathy: No  Polydipsia: No  Polyphagia: No  Polyuria: No  Visual change: No  Slow healing wounds: No  Autonomic neuropathy: No  CVA: No  Heart disease: No  Nephropathy: No  Peripheral neuropathy: No  Peripheral Vascular Disease: No  Retinopathy: No  Sexual dysfunction: No    Patient Problem List and Family Medical History reviewed for relevant medical history, current medical status, and diabetes risk factors.    Vitals:  Ht 1.702 m (5' 7\")   Wt 87.5 kg (193 lb)   BMI 30.23 kg/m    Estimated body mass index is 30.23 kg/m  as calculated from the following:    Height as of this encounter: 1.702 m (5' 7\").    Weight as of this encounter: 87.5 kg (193 lb).   Last 3 BP:   BP Readings from Last 3 Encounters:   11/08/23 136/83   07/26/23 122/68   05/23/23 (!) 163/79       History   Smoking Status    Former    Types: Cigarettes    Quit date: 1/1/2008   Smokeless Tobacco    Never       Labs:  Lab Results   Component Value Date    A1C 7.9 11/06/2023     Lab Results   Component Value Date     11/06/2023     08/19/2021     Lab Results   Component Value Date     11/06/2023     Direct Measure HDL   Date Value Ref Range Status   11/06/2023 78 >=40 mg/dL Final   ]  GFR Estimate   Date Value Ref Range Status   11/06/2023 83 >60 mL/min/1.73m2 Final     No results found for: \"GFRESTBLACK\"  Lab " "Results   Component Value Date    CR 0.94 11/06/2023     No results found for: \"MICROALBUMIN\"    Healthy Eating:  Cultural/[place] diet restrictions?: No  Meal planning/habits: Carb counting, Heart healthy, Smaller portions  How many times a week on average do you eat food made away from home (restaurant/take-out)?: 2  Meals include: Breakfast, Lunch, Dinner, Evening Snack  Beverages: Water, Coffee, Milk, Diet soda    Being Active:  Days per week of moderate to strenuous exercise (like a brisk walk): 7  On average, minutes per day of exercise at this level: 40  How intense was your typical exercise? : Moderate (like brisk walking)  Exercise Minutes per Week: 280  Barrier to exercise: None    Monitoring:  Blood Glucose Meter: ContourCoreworxt, CGM  Times checking blood sugar at home (number): 5+  Times checking blood sugar at home (per): Day  Blood glucose trend: Fluctuating                Basal Rate  11:00 am changed from 0.500 to 0.45  8:00pm time change to 7:00pm    Taking Medications:  Diabetes Medication(s)       Insulin       insulin aspart (NOVOLOG VIAL) 100 UNITS/ML vial    INJECT UP TO 70 UNITS DAILY VIA PUMP- DX E10.9 STRENGTH: 100 UNITS            Current Treatments: Insulin Pump  Dose schedule: Pre-breakfast, Pre-lunch, Pre-dinner  Given by: Patient  Injection/Infusion sites: Abdomen    Problem Solving:                 Reducing Risks:  CAD Risks: Diabetes Mellitus, Hypertension  Has dilated eye exam at least once a year?: Yes  Sees dentist every 6 months?: Yes  Feet checked by healthcare provider in the last year?: Yes    Healthy Coping:  Informal Support system:: [place] based, Family, Friends, Neighbors, Spouse  Patient Activation Measure Survey Score:       No data to display                  Care Plan and Education Provided:  Care Plan: Diabetes   Updates made by Rupal White RD since 11/19/2023 12:00 AM        Problem: HbA1C Not In Goal         Goal: Establish Regular Follow-Ups with PCP         Task: " Discuss with PCP the recommended timing for patient's next follow up visit(s)    Responsible User: Rupal White RD        Task: Discuss schedule for PCP visits with patient Completed 11/19/2023   Responsible User: Rupal White RD        Goal: Get HbA1C Level in Goal         Task: Educate patient on diabetes education self-management topics Completed 11/19/2023   Responsible User: Rupal White RD        Task: Educate patient on benefits of regular glucose monitoring    Responsible User: Rupal White RD        Task: Refer patient to appropriate extended care team member, as needed (Medication Therapy Management, Behavioral Health, Physical Therapy, etc.)    Responsible User: Rupal White RD        Task: Discuss diabetes treatment plan with patient Completed 11/19/2023   Responsible User: Rupal White RD        Problem: Diabetes Self-Management Education Needed to Optimize Self-Care Behaviors         Goal: Understand diabetes pathophysiology and disease progression         Task: Provide education on diabetes pathophysiology and disease progression specfic to patient's diabetes type    Responsible User: Rupal White RD        Goal: Healthy Eating - follow a healthy eating pattern for diabetes         Task: Provide education on portion control and consistency in amount, composition and timing of food intake    Responsible User: Rupal White RD        Task: Provide education on managing carbohydrate intake (carbohydrate counting, plate planning method, etc.) Completed 11/19/2023   Responsible User: Rupal White RD        Task: Provide education on weight management Completed 11/19/2023   Responsible User: Rupal White RD        Task: Provide education on heart healthy eating Completed 11/19/2023   Responsible User: Rupal White RD        Task: Provide education on eating out    Responsible User: Rupal White RD        Task: Develop individualized healthy eating plan with patient     Responsible User: Rupal White RD        Goal: Being Active - get regular physical activity, working up to at least 150 minutes per week         Task: Provide education on relationship of activity to glucose and precautions to take if at risk for low glucose Completed 11/19/2023   Responsible User: Rupal White RD        Task: Discuss barriers to physical activity with patient    Responsible User: Rupal White RD        Task: Develop physical activity plan with patient    Responsible User: Rupal White RD        Task: Explore community resources including walking groups, assistance programs, and home videos    Responsible User: Rupal White RD        Goal: Monitoring - monitor glucose and ketones as directed    This Visit's Progress: 100%   Note:    Pt to use Dexcom sensor and BG testing as a back up       Task: Provide education on blood glucose monitoring (purpose, proper technique, frequency, glucose targets, interpreting results, when to use glucose control solution, sharps disposal)    Responsible User: Rupal White RD        Task: Provide education on continuous glucose monitoring (sensor placement, use of varsha or /reader, understanding glucose trends, alerts and alarms, differences between sensor glucose and blood glucose) Completed 11/19/2023   Responsible User: Rupal White RD        Task: Provide education on ketone monitoring (when to monitor, frequency, etc.)    Responsible User: Rupal White RD        Goal: Taking Medication - patient is consistently taking medications as directed         Task: Provide education on action of prescribed medication, including when to take and possible side effects Completed 11/19/2023   Responsible User: Rupal White RD        Task: Provide education on insulin and injectable diabetes medications, including administration, storage, site selection and rotation for injection sites Completed 11/19/2023   Responsible User: Rupal White  WYATT        Task: Discuss barriers to medication adherence with patient and provide management technique ideas as appropriate    Responsible User: Rupal White RD        Task: Provide education on frequency and refill details of medications    Responsible User: Rupal White RD        Goal: Problem Solving - know how to prevent and manage short-term diabetes complications         Task: Provide education on high blood glucose - causes, signs/symptoms, prevention and treatment Completed 11/19/2023   Responsible User: Rupal White RD        Task: Provide education on low blood glucose - causes, signs/symptoms, prevention, treatment, carrying a carbohydrate source at all times, and medical identification Completed 11/19/2023   Responsible User: Rupal White RD        Task: Provide education on safe travel with diabetes    Responsible User: Rupal White RD        Task: Provide education on how to care for diabetes on sick days Completed 11/19/2023   Responsible User: Rupal White RD        Task: Provide education on when to call a health care provider    Responsible User: Rupal White RD        Goal: Reducing Risks - know how to prevent and treat long-term diabetes complications         Task: Provide education on major complications of diabetes, prevention, early diagnostic measures and treatment of complications    Responsible User: Rupal White RD        Task: Provide education on recommended care for dental, eye and foot health Completed 11/19/2023   Responsible User: Rupal White RD        Task: Provide education on Hemoglobin A1c - goals and relationship to blood glucose levels Completed 11/19/2023   Responsible User: Rupal White RD        Task: Provide education on recommendations for heart health - lipid levels and goals, blood pressure and goals, and aspirin therapy, if indicated    Responsible User: Rupal White RD        Task: Provide education on tobacco cessation    Responsible  User: Rupal White RD        Goal: Healthy Coping - use available resources to cope with the challenges of managing diabetes         Task: Discuss recognizing feelings about having diabetes    Responsible User: Rupal White RD        Task: Provide education on the benefits of making appropriate lifestyle changes Completed 11/19/2023   Responsible User: Rupal White RD        Task: Provide education on benefits of utilizing support systems    Responsible User: Rupal White RD        Task: Discuss methods for coping with stress    Responsible User: Rupal White RD        Task: Provide education on when to seek professional counseling    Responsible User: Rupal White RD            Time Spent: 60 minutes  Encounter Type: Individual    Any diabetes medication dose changes were made via the CDE Protocol per the patient's referring provider. A copy of this encounter was shared with the provider.

## 2023-12-07 DIAGNOSIS — E10.9 TYPE 1 DIABETES MELLITUS WITHOUT COMPLICATION (H): ICD-10-CM

## 2023-12-08 RX ORDER — INSULIN ASPART 100 [IU]/ML
INJECTION, SOLUTION INTRAVENOUS; SUBCUTANEOUS
Qty: 90 ML | Refills: 1 | Status: SHIPPED | OUTPATIENT
Start: 2023-12-08 | End: 2024-04-26

## 2024-01-30 DIAGNOSIS — E10.9 TYPE 1 DIABETES MELLITUS WITHOUT COMPLICATION (H): ICD-10-CM

## 2024-02-17 ENCOUNTER — HEALTH MAINTENANCE LETTER (OUTPATIENT)
Age: 79
End: 2024-02-17

## 2024-03-26 DIAGNOSIS — E10.65 TYPE 1 DIABETES MELLITUS WITH HYPERGLYCEMIA (H): ICD-10-CM

## 2024-03-26 RX ORDER — PROCHLORPERAZINE 25 MG/1
SUPPOSITORY RECTAL
Qty: 3 EACH | Refills: 11 | Status: SHIPPED | OUTPATIENT
Start: 2024-03-26 | End: 2024-04-05

## 2024-04-05 DIAGNOSIS — E10.65 TYPE 1 DIABETES MELLITUS WITH HYPERGLYCEMIA (H): ICD-10-CM

## 2024-04-05 RX ORDER — PROCHLORPERAZINE 25 MG/1
SUPPOSITORY RECTAL
Qty: 3 EACH | Refills: 11 | Status: CANCELLED | OUTPATIENT
Start: 2024-04-05

## 2024-04-05 RX ORDER — PROCHLORPERAZINE 25 MG/1
SUPPOSITORY RECTAL
Qty: 3 EACH | Refills: 4 | Status: SHIPPED | OUTPATIENT
Start: 2024-04-05

## 2024-04-18 ENCOUNTER — OFFICE VISIT (OUTPATIENT)
Dept: EDUCATION SERVICES | Facility: CLINIC | Age: 79
End: 2024-04-18
Payer: COMMERCIAL

## 2024-04-18 DIAGNOSIS — E10.9 TYPE 1 DIABETES MELLITUS (H): Primary | ICD-10-CM

## 2024-04-18 PROCEDURE — G0108 DIAB MANAGE TRN  PER INDIV: HCPCS | Performed by: DIETITIAN, REGISTERED

## 2024-04-18 NOTE — LETTER
4/18/2024         RE: Irwin Malloy  [address] [facility] [id number]        Dear Colleague,    Thank you for referring your patient, Irwin Malloy, to the [facility]. Please see a copy of my visit note below.    No notes on file

## 2024-04-19 ENCOUNTER — TELEPHONE (OUTPATIENT)
Dept: FAMILY MEDICINE | Facility: CLINIC | Age: 79
End: 2024-04-19
Payer: COMMERCIAL

## 2024-04-19 DIAGNOSIS — E10.65 TYPE 1 DIABETES MELLITUS WITH HYPERGLYCEMIA (H): Primary | ICD-10-CM

## 2024-04-19 NOTE — TELEPHONE ENCOUNTER
Reason for Call:  Other call back    Detailed comments: Requesting orders and call back on clarifications on fasting for labs on 4/23 labs for upcoming visit 4/26 with pcp.    Phone Number Patient can be reached at: Cell number on file:    Telephone Information:   Mobile [phone]       Best Time: anytime    Can we leave a detailed message on this number? YES    Call taken on 4/19/2024 at 2:01 PM by Emely Silver

## 2024-04-19 NOTE — TELEPHONE ENCOUNTER
Spoke with patient - he is wondering if he is needing to have any lab work done.     Per chart review, he had A1c, TSH, Albumin, Lipid Panel, and BMP done in November.    Any other labs needed besides the A1c?

## 2024-04-21 VITALS — HEIGHT: 67 IN | BODY MASS INDEX: 30.95 KG/M2 | WEIGHT: 197.2 LBS

## 2024-04-21 NOTE — PROGRESS NOTES
Diabetes Self-Management Education & Support    Presents for: Insulin Pump and CGM Review for Type I Diabetes    Type of Service: In Person Visit      ASSESSMENT:  Pt enjoys traveling and unfortunately patient's wife sustained a significant fall breaking both ankles.  Patient is having to cook and prepare meals independently which is a big change for patient also pt is under high amounts of stress, currently patient's wife is in a rehab facility in another state.    Patient has not been paying as close attention to his diabetes resulting in higher sensor glucose average 194mg/dL.      Patient's most recent   Lab Results   Component Value Date    A1C 7.9 11/06/2023     is meeting goal of <8.0    Diabetes knowledge and skills assessment:   Patient is knowledgeable in diabetes management concepts related to: Healthy Eating, Being Active, Monitoring, Taking Medication, Problem Solving, Reducing Risks, Healthy Coping, and Insulin Pump Concepts Balancing glucose and insulin  Carbohydrate counting  Calculating boluses  Problem solving with insulin pump therapy (BG monitoring; hypoglycemia signs/symptoms, treatment (glucagon) and prevention; hyperglycemia signs/symptoms, treatment and prevention; ketones, DKA signs/symptoms and prevention)  Hands on practice with basic pump button use     Continue education with the following diabetes management concepts: Healthy Eating, Taking Medication, Reducing Risks, and Insulin Pump Concepts adjusting settings      Based on learning assessment above, most appropriate setting for further diabetes education would be: Individual setting.      PLAN  Continue to bolus ~30 min prior to meals if glucose is >120mg/dL     Adjust pump setting and continue to adjust as needed to maintain glucose control 70 - 180 goal >70% of the time     Topics to cover at upcoming visits: Taking Medication, Problem Solving, and Insulin Pump Concepts adjusting settings as needed     Follow-up: 4 months     See  "Care Plan for co-developed, patient-state behavior change goals.  AVS provided for patient today.     Education Materials Provided:  Goals for Your Diabetes Care and Carbohydrate Counting     SUBJECTIVE/OBJECTIVE:  Presents for: Insulin Pump and CGM Review  Accompanied by: Self  Diabetes education in the past 24mo: Yes  Diabetes type: Type 1  Disease course: Stable  How confident are you filling out medical forms by yourself:: Extremely  Transportation concerns: No  Difficulty affording diabetes medication?: No  Difficulty affording diabetes testing supplies?: No  Other concerns:: None  Cultural Influences/Ethnic Background:  Not  or     Diabetes Symptoms & Complications:  Weight trend: Stable  Symptom course: Stable  Disease course: Stable  Complications assessed today?: Yes  Autonomic neuropathy: No  CVA: No  Heart disease: No  Nephropathy: No  Peripheral neuropathy: No  Peripheral Vascular Disease: No  Retinopathy: Yes    Patient Problem List and Family Medical History reviewed for relevant medical history, current medical status, and diabetes risk factors.    Vitals:  Ht 1.702 m (5' 7\")   Wt 89.4 kg (197 lb 3.2 oz)   BMI 30.89 kg/m    Estimated body mass index is 30.89 kg/m  as calculated from the following:    Height as of this encounter: 1.702 m (5' 7\").    Weight as of this encounter: 89.4 kg (197 lb 3.2 oz).   Last 3 BP:   BP Readings from Last 3 Encounters:   11/08/23 136/83   07/26/23 122/68   05/23/23 (!) 163/79       History   Smoking Status    Former    Types: Cigarettes    Quit date: 1/1/2008   Smokeless Tobacco    Never       Labs:  Lab Results   Component Value Date    A1C 7.9 11/06/2023     Lab Results   Component Value Date     11/06/2023     08/19/2021     Lab Results   Component Value Date     11/06/2023     Direct Measure HDL   Date Value Ref Range Status   11/06/2023 78 >=40 mg/dL Final   ]  GFR Estimate   Date Value Ref Range Status   11/06/2023 83 >60 " "mL/min/1.73m2 Final     No results found for: \"GFRESTBLACK\"  Lab Results   Component Value Date    CR 0.94 11/06/2023     No results found for: \"MICROALBUMIN\"    Healthy Eating:  Healthy Eating Assessed Today: Yes  Cultural/Oriental orthodox diet restrictions?: No  Do you have any food allergies or intolerances?: No  Meal planning/habits: Carb counting, Heart healthy, Smaller portions  Who cooks/prepares meals for you?: Spouse, Self  Who purchases food in  your home?: Self, Spouse  How many times a week on average do you eat food made away from home (restaurant/take-out)?: 2  Meals include: Breakfast, Lunch, Dinner, Evening Snack  Beverages: Water, Coffee, Milk, Diet soda    Being Active:  Being Active Assessed Today: Yes  Exercise:: Yes  Days per week of moderate to strenuous exercise (like a brisk walk): 4  On average, minutes per day of exercise at this level: 40  How intense was your typical exercise? : Light (like stretching or slow walking)  Exercise Minutes per Week: 160  Barrier to exercise: None    Monitoring:  Monitoring Assessed Today: Yes  Did patient bring glucose meter to appointment? : Yes  Blood Glucose Meter: CGM  Blood glucose trend: Fluctuating        Taking Medications:  Diabetes Medication(s)       Insulin       insulin aspart (NOVOLOG VIAL) 100 UNITS/ML vial INJECT UP TO 70 UNITS DAILY VIA PUMP- DX E10.9 STRENGTH: 100 UNITS            Taking Medication Assessed Today: Yes  Current Treatments: Insulin Pump  Problems taking diabetes medications regularly?: No  Diabetes medication side effects?: No  Current Pump settings     CHANGED to         Problem Solving:  Problem Solving Assessed Today: Yes  Is the patient at risk for hypoglycemia?: Yes  Hypoglycemia Frequency: Weekly  Hypoglycemia Treatment: Glucose (tablets or gel), Juice, Candy  Medical ID: Yes  Does patient have glucagon emergency kit?: No  Is the patient at risk for DKA?: Yes  Does patient have ketone test strips?: No  Does patient have DKA " prevention plan?: Yes  Does patient have severe weather/disaster plan for diabetes management?: Yes  Does patient have sick day plan for diabetes management?: Yes    Hypoglycemia Symptoms  Hypoglycemia: Dizziness/Lightheadedness, Feeling shaky, Sweats    Hypoglycemia Complications  Hypoglycemia Complications: None    Reducing Risks:  Reducing Risks Assessed Today: Yes  Diabetes Risks: Age over 45 years, Hyperlipidemia  CAD Risks: Diabetes Mellitus, Male sex, Dyslipidemia  Has dilated eye exam at least once a year?: Yes  Sees dentist every 6 months?: Yes  Feet checked by healthcare provider in the last year?: Yes    Healthy Coping:  Healthy Coping Assessed Today: Yes  Emotional response to diabetes: Ready to learn  Informal Support system:: [place] based, Family, Friends, Neighbors, Spouse  Stage of change: ACTION (Actively working towards change)  Support resources: Websites, In-person Offerings, Exercise  Patient Activation Measure Survey Score:       No data to display                  Care Plan and Education Provided:  Care Plan: Diabetes   Updates made by Rupal White RD since 4/21/2024 12:00 AM        Problem: HbA1C Not In Goal         Goal: Establish Regular Follow-Ups with PCP         Task: Discuss with PCP the recommended timing for patient's next follow up visit(s)    Responsible User: Rupal White RD        Task: Discuss schedule for PCP visits with patient Completed 4/21/2024   Responsible User: Rupal White RD        Goal: Get HbA1C Level in Goal         Task: Educate patient on diabetes education self-management topics Completed 4/21/2024   Responsible User: Rupal White RD        Task: Educate patient on benefits of regular glucose monitoring    Responsible User: Rupal White RD        Task: Refer patient to appropriate extended care team member, as needed (Medication Therapy Management, Behavioral Health, Physical Therapy, etc.)    Responsible User: Rupal White RD        Task: Discuss  diabetes treatment plan with patient Completed 4/21/2024   Responsible User: Rupal White RD        Problem: Diabetes Self-Management Education Needed to Optimize Self-Care Behaviors         Goal: Understand diabetes pathophysiology and disease progression         Task: Provide education on diabetes pathophysiology and disease progression specfic to patient's diabetes type    Responsible User: Rupal White RD        Goal: Healthy Eating - follow a healthy eating pattern for diabetes         Task: Provide education on portion control and consistency in amount, composition and timing of food intake    Responsible User: Rupal White RD        Task: Provide education on managing carbohydrate intake (carbohydrate counting, plate planning method, etc.) Completed 4/21/2024   Responsible User: Rupal White RD        Task: Provide education on weight management    Responsible User: Rupal White RD        Task: Provide education on heart healthy eating Completed 4/21/2024   Responsible User: Rupal White RD        Task: Provide education on eating out    Responsible User: Rupal White RD        Task: Develop individualized healthy eating plan with patient    Responsible User: Rupal Whiet RD        Goal: Being Active - get regular physical activity, working up to at least 150 minutes per week         Task: Provide education on relationship of activity to glucose and precautions to take if at risk for low glucose Completed 4/21/2024   Responsible User: Rupal White RD        Task: Discuss barriers to physical activity with patient    Responsible User: Rupal White RD        Task: Develop physical activity plan with patient    Responsible User: Rupal White RD        Task: Explore community resources including walking groups, assistance programs, and home videos    Responsible User: Rupal White RD        Goal: Monitoring - monitor glucose and ketones as directed    This Visit's Progress: 80%    Note:    Pt to use sensor and BG testing as a back up or prn       Task: Provide education on blood glucose monitoring (purpose, proper technique, frequency, glucose targets, interpreting results, when to use glucose control solution, sharps disposal)    Responsible User: Rupal White RD        Task: Provide education on continuous glucose monitoring (sensor placement, use of varsha or /reader, understanding glucose trends, alerts and alarms, differences between sensor glucose and blood glucose) Completed 4/21/2024   Responsible User: Rupal White RD        Task: Provide education on ketone monitoring (when to monitor, frequency, etc.)    Responsible User: Rupal White RD        Goal: Taking Medication - patient is consistently taking medications as directed         Task: Provide education on action of prescribed medication, including when to take and possible side effects Completed 4/21/2024   Responsible User: Rupal White RD        Task: Provide education on insulin and injectable diabetes medications, including administration, storage, site selection and rotation for injection sites    Responsible User: Rupal White RD        Task: Discuss barriers to medication adherence with patient and provide management technique ideas as appropriate    Responsible User: Rupal White RD        Task: Provide education on frequency and refill details of medications    Responsible User: Rupal White RD        Goal: Problem Solving - know how to prevent and manage short-term diabetes complications         Task: Provide education on high blood glucose - causes, signs/symptoms, prevention and treatment    Responsible User: Rupal White RD        Task: Provide education on low blood glucose - causes, signs/symptoms, prevention, treatment, carrying a carbohydrate source at all times, and medical identification Completed 4/21/2024   Responsible User: Rupal White RD        Task: Provide education on  safe travel with diabetes    Responsible User: Rupal White RD        Task: Provide education on how to care for diabetes on sick days    Responsible User: Rupal White RD        Task: Provide education on when to call a health care provider    Responsible User: Rupal White RD        Goal: Reducing Risks - know how to prevent and treat long-term diabetes complications         Task: Provide education on major complications of diabetes, prevention, early diagnostic measures and treatment of complications    Responsible User: Rupal White RD        Task: Provide education on recommended care for dental, eye and foot health Completed 4/21/2024   Responsible User: Rupal White RD        Task: Provide education on Hemoglobin A1c - goals and relationship to blood glucose levels Completed 4/21/2024   Responsible User: Rupal White RD        Task: Provide education on recommendations for heart health - lipid levels and goals, blood pressure and goals, and aspirin therapy, if indicated    Responsible User: Rupal White RD        Task: Provide education on tobacco cessation    Responsible User: Rupal White RD        Goal: Healthy Coping - use available resources to cope with the challenges of managing diabetes         Task: Discuss recognizing feelings about having diabetes    Responsible User: Rupal White RD        Task: Provide education on the benefits of making appropriate lifestyle changes Completed 4/21/2024   Responsible User: Rupal White RD        Task: Provide education on benefits of utilizing support systems    Responsible User: Rupal White RD        Task: Discuss methods for coping with stress    Responsible User: Rupal White RD        Task: Provide education on when to seek professional counseling    Responsible User: Rupal White RD            Time Spent: 60 minutes  Encounter Type: Individual    Any diabetes medication dose changes were made via the CDE Protocol per  the patient's referring provider. A copy of this encounter was shared with the provider.

## 2024-04-22 ENCOUNTER — LAB (OUTPATIENT)
Dept: LAB | Facility: CLINIC | Age: 79
End: 2024-04-22
Payer: COMMERCIAL

## 2024-04-22 DIAGNOSIS — E10.3293 TYPE 1 DIABETES MELLITUS WITH MILD NONPROLIFERATIVE RETINOPATHY OF BOTH EYES WITHOUT MACULAR EDEMA (H): Primary | ICD-10-CM

## 2024-04-22 LAB — HBA1C MFR BLD: 8.3 % (ref 0–5.6)

## 2024-04-22 PROCEDURE — 36415 COLL VENOUS BLD VENIPUNCTURE: CPT

## 2024-04-22 PROCEDURE — 83036 HEMOGLOBIN GLYCOSYLATED A1C: CPT

## 2024-04-26 ENCOUNTER — OFFICE VISIT (OUTPATIENT)
Dept: FAMILY MEDICINE | Facility: CLINIC | Age: 79
End: 2024-04-26
Payer: COMMERCIAL

## 2024-04-26 VITALS
SYSTOLIC BLOOD PRESSURE: 126 MMHG | OXYGEN SATURATION: 97 % | RESPIRATION RATE: 12 BRPM | HEIGHT: 67 IN | HEART RATE: 85 BPM | BODY MASS INDEX: 31.55 KG/M2 | WEIGHT: 201 LBS | DIASTOLIC BLOOD PRESSURE: 79 MMHG | TEMPERATURE: 97.8 F

## 2024-04-26 DIAGNOSIS — Z00.00 HEALTH CARE MAINTENANCE: Primary | ICD-10-CM

## 2024-04-26 DIAGNOSIS — E10.9 TYPE 1 DIABETES MELLITUS WITHOUT COMPLICATION (H): ICD-10-CM

## 2024-04-26 DIAGNOSIS — E06.3 HYPOTHYROIDISM DUE TO HASHIMOTO'S THYROIDITIS: ICD-10-CM

## 2024-04-26 DIAGNOSIS — E10.3293 TYPE 1 DIABETES MELLITUS WITH MILD NONPROLIFERATIVE RETINOPATHY OF BOTH EYES WITHOUT MACULAR EDEMA (H): ICD-10-CM

## 2024-04-26 DIAGNOSIS — K21.9 GASTROESOPHAGEAL REFLUX DISEASE WITHOUT ESOPHAGITIS: ICD-10-CM

## 2024-04-26 DIAGNOSIS — E78.00 PURE HYPERCHOLESTEROLEMIA: ICD-10-CM

## 2024-04-26 PROCEDURE — 99214 OFFICE O/P EST MOD 30 MIN: CPT | Performed by: INTERNAL MEDICINE

## 2024-04-26 RX ORDER — LEVOTHYROXINE SODIUM 175 UG/1
175 TABLET ORAL DAILY
Qty: 90 TABLET | Refills: 3 | Status: SHIPPED | OUTPATIENT
Start: 2024-04-26

## 2024-04-26 RX ORDER — PRAVASTATIN SODIUM 20 MG
20 TABLET ORAL AT BEDTIME
Qty: 90 TABLET | Refills: 3 | Status: SHIPPED | OUTPATIENT
Start: 2024-04-26

## 2024-04-26 RX ORDER — INSULIN ASPART 100 [IU]/ML
INJECTION, SOLUTION INTRAVENOUS; SUBCUTANEOUS
Qty: 90 ML | Refills: 3 | Status: SHIPPED | OUTPATIENT
Start: 2024-04-26

## 2024-04-26 RX ORDER — LOSARTAN POTASSIUM 50 MG/1
50 TABLET ORAL DAILY
Qty: 90 TABLET | Refills: 3 | Status: SHIPPED | OUTPATIENT
Start: 2024-04-26

## 2024-04-26 RX ORDER — OMEPRAZOLE 20 MG/1
20 TABLET, DELAYED RELEASE ORAL DAILY
Qty: 90 TABLET | Refills: 3 | Status: SHIPPED | OUTPATIENT
Start: 2024-04-26

## 2024-04-26 NOTE — ASSESSMENT & PLAN NOTE
CRC screening: -Cologuard positive(5/2019); colonoscopy (6/2019); multiple adenomatous polyps    - last colonoscopy 9/2022: multiple tubular adenomas removed; would plan to repeat in '25   - PSA 0.3 (11/2015) - declines regular future screening   - intentional tremor (worse in AM) - suspect essential tremor; likely worsened by caffeine.  Low suspicions for parkinsons   - fall, 2021 - shingles episode (had not been vaccinated)  Adult vaccinations discussed and updated accordingly

## 2024-04-26 NOTE — ASSESSMENT & PLAN NOTE
type I DM, uncontrolled, late onset diabetes in adulthood (ROLANDO)  hypoglycemic medications:  insulin therapy:  Tandem pump (initiated in '22; [employer]tr[employer] prior),  started on Dexcom6 CGM (9/2020), May, 2022 - changed to Tandem G7 sensor with auto-basal features     last HbA1c: 8.3%   microvascular complications: +retinopathy, + BHARATH x1  macrovascular complications: none  ASA/SIMON/statin:  ASA 81mg daily, losartan 50mg daily, pravastatin 20mg at bedtime    Patient has met criteria for Continuous Glucose Monitoring (CGM) coverage:   - Patient has type one diabetes mellitus; and   - patient has been using a blood glucose monitor and performing four or more blood glucose test per day; and   - patient is insulin treated with multiple daily injections; and   - patient is requiring frequent insulin adjustments on the basis of blood glucose readings.   - Within six months prior to ordering the CGM, the patient has had an in-person visit with the practitioner; and determined that criteria (1-4) above are met; and   - every six months following the initial prescription of the CGM, the treating practitioner has an in-person visit with the patient to assess adherence to their CGM regimen and diabetes treatment plan.

## 2024-04-26 NOTE — PROGRESS NOTES
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 (H)     type I DM, uncontrolled, late onset diabetes in adulthood (ROLANDO)  hypoglycemic medications:  insulin therapy:  Tandem pump (initiated in '22; Medtronic prior),  started on Dexcom6 CGM (9/2020), May, 2022 - changed to Tandem G7 sensor with auto-basal features     last HbA1c: 8.3%   microvascular complications: +retinopathy, + BHARATH x1  macrovascular complications: none  ASA/SIMON/statin:  ASA 81mg daily, losartan 50mg daily, pravastatin 20mg at bedtime    Patient has met criteria for Continuous Glucose Monitoring (CGM) coverage:   - Patient has type one diabetes mellitus; and   - patient has been using a blood glucose monitor and performing four or more blood glucose test per day; and   - patient is insulin treated with multiple daily injections; and   - patient is requiring frequent insulin adjustments on the basis of blood glucose readings.   - Within six months prior to ordering the CGM, the patient has had an in-person visit with the practitioner; and determined that criteria (1-4) above are met; and   - every six months following the initial prescription of the CGM, the treating practitioner has an in-person visit with the patient to assess adherence to their CGM regimen and diabetes treatment plan.              Relevant Medications    insulin aspart (NOVOLOG VIAL) 100 UNITS/ML vial    levothyroxine (SYNTHROID/LEVOTHROID) 175 MCG tablet    Hypothyroidism due to Hashimoto's thyroiditis     Stable levothyroxine dosing  - last TSH, 11/2023         Relevant Medications    levothyroxine (SYNTHROID/LEVOTHROID) 175 MCG tablet    Pure hypercholesterolemia    Relevant Medications    losartan (COZAAR) 50 MG tablet    pravastatin (PRAVACHOL) 20 MG tablet       Other    Health care maintenance - Primary     CRC screening: -aMlcolm  "positive(5/2019); colonoscopy (6/2019); multiple adenomatous polyps    - last colonoscopy 9/2022: multiple tubular adenomas removed; would plan to repeat in '25   - PSA 0.3 (11/2015) - declines regular future screening   - intentional tremor (worse in AM) - suspect essential tremor; likely worsened by caffeine.  Low suspicions for parkinsons   - fall, 2021 - shingles episode (had not been vaccinated)  Adult vaccinations discussed and updated accordingly          Other Visit Diagnoses       Type 1 diabetes mellitus without complication (H)        Relevant Medications    insulin aspart (NOVOLOG VIAL) 100 UNITS/ML vial    levothyroxine (SYNTHROID/LEVOTHROID) 175 MCG tablet    Gastroesophageal reflux disease without esophagitis        Relevant Medications    psyllium (METAMUCIL/KONSYL) Packet    omeprazole (PRILOSEC OTC) 20 MG EC tablet              BMI  Estimated body mass index is 31.48 kg/m  as calculated from the following:    Height as of this encounter: 1.702 m (5' 7\").    Weight as of this encounter: 91.2 kg (201 lb).   Weight management plan: Discussed healthy diet and exercise guidelines    FUTURE APPOINTMENTS:       - Follow-up visit in 6 months      Subjective   Irwin is a 79 year old, presenting for the following health issues: Presents for follow-up.  Shares his wife currently in a skilled care facility in [place] after complications from a fall at her brother's house.  Resulted in fractures in both her ankles, requiring bilateral ORIF.  Since has been nonweightbearing.  No clear timeline on her return.  This has been weighing on Irwin obviously.  From a diabetic standpoint has been doing reasonably well.  He is bothered by some weight gain in recent years.  His activity levels have dropped primarily due to his intolerance of swimming any longer.  Does walk on a regular basis.  Diabetes        4/26/2024    10:11 AM   Additional Questions   Roomed by Jyoti ERNST     History of Present Illness " "      Diabetes:   He presents for follow up of diabetes.   He is checking home blood glucose with a continuous glucose monitor.   He checks blood glucose before meals, after meals, before and after meals and at bedtime.  Blood glucose is sometimes over 200 and never under 70. He is aware of hypoglycemia symptoms including dizziness.    He has no concerns regarding his diabetes at this time.   He is not experiencing numbness or burning in feet, excessive thirst, blurry vision, weight changes or redness, sores or blisters on feet.           He eats 2-3 servings of fruits and vegetables daily.He consumes 0 sweetened beverage(s) daily.He exercises with enough effort to increase his heart rate 30 to 60 minutes per day.  He exercises with enough effort to increase his heart rate 7 days per week.   He is taking medications regularly.         Review of Systems  Constitutional, HEENT, cardiovascular, pulmonary, gi and gu systems are negative, except as otherwise noted.      Objective    /79   Pulse 85   Temp 97.8  F (36.6  C)   Resp 12   Ht 1.702 m (5' 7\")   Wt 91.2 kg (201 lb)   SpO2 97%   BMI 31.48 kg/m    Body mass index is 31.48 kg/m .  Physical Exam   GENERAL: alert and no distress  NECK: no adenopathy, no asymmetry, masses, or scars  RESP: lungs clear to auscultation - no rales, rhonchi or wheezes  CV: regular rate and rhythm, normal S1 S2, no S3 or S4, no murmur, click or rub, no peripheral edema  ABDOMEN: soft, nontender, no hepatosplenomegaly, no masses and bowel sounds normal  MS: no gross musculoskeletal defects noted, no edema    Lab on 04/22/2024   Component Date Value Ref Range Status    Hemoglobin A1C 04/22/2024 8.3 (H)  0.0 - 5.6 % Final    Normal <5.7%   Prediabetes 5.7-6.4%    Diabetes 6.5% or higher     Note: Adopted from ADA consensus guidelines.           Signed Electronically by: Jose Mcqueen MD    "

## 2024-06-27 ENCOUNTER — MYC MEDICAL ADVICE (OUTPATIENT)
Dept: FAMILY MEDICINE | Facility: CLINIC | Age: 79
End: 2024-06-27
Payer: COMMERCIAL

## 2024-06-27 ENCOUNTER — TELEPHONE (OUTPATIENT)
Dept: FAMILY MEDICINE | Facility: CLINIC | Age: 79
End: 2024-06-27
Payer: COMMERCIAL

## 2024-06-27 NOTE — TELEPHONE ENCOUNTER
Patient Quality Outreach    Patient is due for the following:   Physical Annual Wellness Visit    Next Steps:   Schedule a Annual Wellness Visit    Type of outreach:    Sent [employer] message.      Questions for provider review:    None           Jyoti Lundberg MA

## 2024-07-06 ENCOUNTER — HEALTH MAINTENANCE LETTER (OUTPATIENT)
Age: 79
End: 2024-07-06

## 2024-09-14 ENCOUNTER — HEALTH MAINTENANCE LETTER (OUTPATIENT)
Age: 79
End: 2024-09-14

## 2024-10-04 ENCOUNTER — TELEPHONE (OUTPATIENT)
Dept: FAMILY MEDICINE | Facility: CLINIC | Age: 79
End: 2024-10-04
Payer: COMMERCIAL

## 2024-10-04 NOTE — TELEPHONE ENCOUNTER
Forms/Letter Request    Type of form/letter: Diabetic Supplies      Do we have the form/letter: Yes: TFS box, [facility]    Who is the form from? [facility]     Where did/will the form come from? form was faxed in    When is form/letter needed by: soon    How would you like the form/letter returned: Fax : [phone]

## 2024-11-04 ENCOUNTER — MYC MEDICAL ADVICE (OUTPATIENT)
Dept: FAMILY MEDICINE | Facility: CLINIC | Age: 79
End: 2024-11-04
Payer: COMMERCIAL

## 2024-11-11 ENCOUNTER — APPOINTMENT (OUTPATIENT)
Dept: LAB | Facility: CLINIC | Age: 79
End: 2024-11-11
Payer: COMMERCIAL

## 2024-11-11 ENCOUNTER — LAB (OUTPATIENT)
Dept: LAB | Facility: CLINIC | Age: 79
End: 2024-11-11
Payer: COMMERCIAL

## 2024-11-11 DIAGNOSIS — E06.3 HYPOTHYROIDISM DUE TO HASHIMOTO'S THYROIDITIS: ICD-10-CM

## 2024-11-11 DIAGNOSIS — E10.3293 TYPE 1 DIABETES MELLITUS WITH MILD NONPROLIFERATIVE RETINOPATHY OF BOTH EYES WITHOUT MACULAR EDEMA (H): ICD-10-CM

## 2024-11-11 LAB
ANION GAP SERPL CALCULATED.3IONS-SCNC: 10 MMOL/L (ref 7–15)
BUN SERPL-MCNC: 12.9 MG/DL (ref 8–23)
CALCIUM SERPL-MCNC: 9.2 MG/DL (ref 8.8–10.4)
CHLORIDE SERPL-SCNC: 100 MMOL/L (ref 98–107)
CHOLEST SERPL-MCNC: 203 MG/DL
CREAT SERPL-MCNC: 0.87 MG/DL (ref 0.67–1.17)
CREAT UR-MCNC: 41.7 MG/DL
EGFRCR SERPLBLD CKD-EPI 2021: 88 ML/MIN/1.73M2
EST. AVERAGE GLUCOSE BLD GHB EST-MCNC: 169 MG/DL
FASTING STATUS PATIENT QL REPORTED: ABNORMAL
FASTING STATUS PATIENT QL REPORTED: ABNORMAL
GLUCOSE SERPL-MCNC: 242 MG/DL (ref 70–99)
HBA1C MFR BLD: 7.5 % (ref 0–5.6)
HCO3 SERPL-SCNC: 25 MMOL/L (ref 22–29)
HDLC SERPL-MCNC: 60 MG/DL
LDLC SERPL CALC-MCNC: 127 MG/DL
MICROALBUMIN UR-MCNC: <12 MG/L
MICROALBUMIN/CREAT UR: NORMAL MG/G{CREAT}
NONHDLC SERPL-MCNC: 143 MG/DL
POTASSIUM SERPL-SCNC: 4.4 MMOL/L (ref 3.4–5.3)
SODIUM SERPL-SCNC: 135 MMOL/L (ref 135–145)
T4 FREE SERPL-MCNC: 1.38 NG/DL (ref 0.9–1.7)
TRIGL SERPL-MCNC: 79 MG/DL
TSH SERPL DL<=0.005 MIU/L-ACNC: 15.1 UIU/ML (ref 0.3–4.2)

## 2024-11-11 PROCEDURE — 80048 BASIC METABOLIC PNL TOTAL CA: CPT

## 2024-11-11 PROCEDURE — 83036 HEMOGLOBIN GLYCOSYLATED A1C: CPT

## 2024-11-11 PROCEDURE — 36415 COLL VENOUS BLD VENIPUNCTURE: CPT

## 2024-11-11 PROCEDURE — 84439 ASSAY OF FREE THYROXINE: CPT

## 2024-11-11 PROCEDURE — 82570 ASSAY OF URINE CREATININE: CPT

## 2024-11-11 PROCEDURE — 82043 UR ALBUMIN QUANTITATIVE: CPT

## 2024-11-11 PROCEDURE — 84443 ASSAY THYROID STIM HORMONE: CPT

## 2024-11-11 PROCEDURE — 80061 LIPID PANEL: CPT

## 2024-11-12 ENCOUNTER — OFFICE VISIT (OUTPATIENT)
Dept: EDUCATION SERVICES | Facility: CLINIC | Age: 79
End: 2024-11-12
Payer: COMMERCIAL

## 2024-11-12 VITALS — BODY MASS INDEX: 28.94 KG/M2 | HEIGHT: 67 IN | WEIGHT: 184.4 LBS

## 2024-11-12 DIAGNOSIS — E10.9 TYPE 1 DIABETES MELLITUS (H): Primary | ICD-10-CM

## 2024-11-12 PROCEDURE — G0108 DIAB MANAGE TRN  PER INDIV: HCPCS | Performed by: DIETITIAN, REGISTERED

## 2024-11-12 RX ORDER — ACYCLOVIR 400 MG/1
1 TABLET ORAL
COMMUNITY

## 2024-11-12 NOTE — PROGRESS NOTES
Diabetes Self-Management Education & Support    Presents for: Insulin Pump and CGM Review for type 1 diabetes    Type of Service: In Person Visit      ASSESSMENT:  Patient was admitted to hospital 10/23/2020 and discharged on 10/28/2024.  Patient admitted with abdominal pain nausea/vomiting.  Found to have a small bowel obstruction and underwent exploratory laparotomy with small bowel resection on 10/23/2024.  Patient abdominal incision site healing well.      Patient currently using tandem X2 insulin pump with Dexcom G7 sensor integrated automated technology.  A1c improving    Patient's most recent   Lab Results   Component Value Date    A1C 7.5 11/11/2024     is meeting goal of <8.0    Diabetes knowledge and skills assessment:   Patient is knowledgeable in diabetes management concepts related to: Healthy Eating, Being Active, Monitoring, Taking Medication, Problem Solving, Reducing Risks, Healthy Coping, and Insulin Pump Concepts Balancing glucose and insulin  Carbohydrate counting  Calculating boluses  Problem solving with insulin pump therapy (BG monitoring; hypoglycemia signs/symptoms, treatment (glucagon) and prevention; hyperglycemia signs/symptoms, treatment and prevention; ketones, DKA signs/symptoms and prevention)  Hands on practice with basic pump button use     Continue education with the following diabetes management concepts: Healthy Eating, Taking Medication, Reducing Risks, and Insulin Pump Concepts adjusting settings      Based on learning assessment above, most appropriate setting for further diabetes education would be: Individual setting.      PLAN    Continue to bolus ~30 min prior to meals if glucose is >120mg/dL     Adjust pump setting and continue to adjust as needed to maintain glucose control 70 - 180 goal >70% of the time       Topics to cover at upcoming visits: Taking Medication, Problem Solving, and Insulin Pump Concepts adjusting settings as needed     Follow-up: 4 months     See Care  "Plan for co-developed, patient-state behavior change goals.  AVS provided for patient today.     Education Materials Provided:  Goals for Your Diabetes Care and Carbohydrate Counting      SUBJECTIVE/OBJECTIVE:  Presents for: Insulin Pump and CGM Review  Accompanied by: Self  Diabetes education in the past 24mo: Yes  Focus of Visit: Insulin Pump  Diabetes type: Type 1  Disease course: (Patient-Rptd) Stable  How confident are you filling out medical forms by yourself:: Extremely  Transportation concerns: No  Difficulty affording diabetes medication?: No  Difficulty affording diabetes testing supplies?: No  Other concerns:: None  Cultural Influences/Ethnic Background:  Not  or     Diabetes Symptoms & Complications:  Diabetes Related Symptoms: (Patient-Rptd) None  Weight trend: (Patient-Rptd) Stable  Symptom course: (Patient-Rptd) Stable  Disease course: (Patient-Rptd) Stable  Complications assessed today?: Yes  Autonomic neuropathy: No  CVA: No  Heart disease: No  Nephropathy: No  Peripheral neuropathy: No  Peripheral Vascular Disease: No  Retinopathy: Yes    Patient Problem List and Family Medical History reviewed for relevant medical history, current medical status, and diabetes risk factors.    Vitals:  Ht 1.702 m (5' 7\")   Wt 83.6 kg (184 lb 6.4 oz)   BMI 28.88 kg/m    Estimated body mass index is 28.88 kg/m  as calculated from the following:    Height as of this encounter: 1.702 m (5' 7\").    Weight as of this encounter: 83.6 kg (184 lb 6.4 oz).   Last 3 BP:   BP Readings from Last 3 Encounters:   04/26/24 126/79   11/08/23 136/83   07/26/23 122/68       History   Smoking Status    Former    Types: Cigarettes   Smokeless Tobacco    Never       Labs:  Lab Results   Component Value Date    A1C 7.5 11/11/2024     Lab Results   Component Value Date     11/11/2024     08/19/2021     Lab Results   Component Value Date     11/11/2024     Direct Measure HDL   Date Value Ref Range " "Status   11/11/2024 60 >=40 mg/dL Final   ]  GFR Estimate   Date Value Ref Range Status   11/11/2024 88 >60 mL/min/1.73m2 Final     Comment:     eGFR calculated using 2021 CKD-EPI equation.     No results found for: \"GFRESTBLACK\"  Lab Results   Component Value Date    CR 0.87 11/11/2024     No results found for: \"MICROALBUMIN\"    Healthy Eating:  Healthy Eating Assessed Today: Yes  Cultural/Jainism diet restrictions?: (Patient-Rptd) No  Do you have any food allergies or intolerances?: No  Meal planning/habits: Carb counting, Heart healthy, Smaller portions  Who cooks/prepares meals for you?: Spouse, Self  Who purchases food in  your home?: Self, Spouse  How many times a week on average do you eat food made away from home (restaurant/take-out)?: (Patient-Rptd) 1  Meals include: (Patient-Rptd) Breakfast, Lunch, Dinner  Beverages: (Patient-Rptd) Water, Coffee, Milk    Being Active:  Being Active Assessed Today: Yes  Exercise:: Yes  Days per week of moderate to strenuous exercise (like a brisk walk): 4  On average, minutes per day of exercise at this level: 40  How intense was your typical exercise? : Light (like stretching or slow walking)  Exercise Minutes per Week: 160  Barrier to exercise: (Patient-Rptd) Physical limitation    Monitoring:  Monitoring Assessed Today: Yes  Did patient bring glucose meter to appointment? : Yes  Blood Glucose Meter: (Patient-Rptd) CGM  Times checking blood sugar at home (number): Other (see Comments)  Please elaborate::  (CGM continuous)  Times checking blood sugar at home (per): (Patient-Rptd) Day  Blood glucose trend: Fluctuating          Taking Medications:  Diabetes Medication(s)       Insulin       insulin aspart (NOVOLOG VIAL) 100 UNITS/ML vial INJECT UP TO 70 UNITS DAILY VIA PUMP            Changed basal rates and carb ratio at 6:30am and 11am     Taking Medication Assessed Today: Yes  Current Treatments: (Patient-Rptd) Insulin Pump  Problems taking diabetes medications " regularly?: No  Diabetes medication side effects?: No    Problem Solving:  Problem Solving Assessed Today: Yes  Is the patient at risk for hypoglycemia?: Yes  Hypoglycemia Frequency: Weekly  Hypoglycemia Treatment: Glucose (tablets or gel), Juice, Candy  Medical ID: Yes  Does patient have glucagon emergency kit?: No  Is the patient at risk for DKA?: Yes  Does patient have ketone test strips?: No  Does patient have DKA prevention plan?: Yes  Does patient have severe weather/disaster plan for diabetes management?: Yes  Does patient have sick day plan for diabetes management?: Yes    Hypoglycemia Symptoms  Hypoglycemia: Dizziness/Lightheadedness, Feeling shaky, Sweats    Hypoglycemia Complications  Hypoglycemia Complications: None    Reducing Risks:  Reducing Risks Assessed Today: Yes  Diabetes Risks: Age over 45 years, Hyperlipidemia  CAD Risks: Diabetes Mellitus, Male sex, Dyslipidemia  Has dilated eye exam at least once a year?: (Patient-Rptd) No  Sees dentist every 6 months?: (Patient-Rptd) Yes  Feet checked by healthcare provider in the last year?: (Patient-Rptd) Yes    Healthy Coping:  Healthy Coping Assessed Today: Yes  Emotional response to diabetes: Ready to learn  Informal Support system:: (Patient-Rptd) Children, Abbey based, Family, Friends, Neighbors, Spouse  Stage of change: ACTION (Actively working towards change)  Support resources: Websites, In-person Offerings, Exercise  Patient Activation Measure Survey Score:       No data to display                  Care Plan and Education Provided:  Care Plan: Diabetes   Updates made by Rupal White RD since 11/12/2024 12:00 AM        Problem: Diabetes Self-Management Education Needed to Optimize Self-Care Behaviors         Goal: Healthy Eating - follow a healthy eating pattern for diabetes         Task: Provide education on portion control and consistency in amount, composition and timing of food intake Completed 11/12/2024   Responsible User: Rupal White  WYATT        Task: Provide education on eating out Completed 11/12/2024   Responsible User: Rupal White RD        Goal: Monitoring - monitor glucose and ketones as directed    This Visit's Progress: 90%   Recent Progress: 80%   Note:    Pt to use sensor and BG testing as a back up or prn       Goal: Taking Medication - patient is consistently taking medications as directed         Task: Provide education on insulin and injectable diabetes medications, including administration, storage, site selection and rotation for injection sites Completed 11/12/2024   Responsible User: Rupal White RD        Goal: Problem Solving - know how to prevent and manage short-term diabetes complications         Task: Provide education on safe travel with diabetes Completed 11/12/2024   Responsible User: Rupal White RD        Goal: Reducing Risks - know how to prevent and treat long-term diabetes complications         Task: Provide education on recommendations for heart health - lipid levels and goals, blood pressure and goals, and aspirin therapy, if indicated Completed 11/12/2024   Responsible User: Rupal White RD        Goal: Healthy Coping - use available resources to cope with the challenges of managing diabetes         Task: Discuss methods for coping with stress Completed 11/12/2024   Responsible User: Rupal White RD            Time Spent: 60 minutes  Encounter Type: Individual    Any diabetes medication dose changes were made via the CDE Protocol per the patient's referring provider. A copy of this encounter was shared with the provider.

## 2024-11-12 NOTE — LETTER
11/12/2024         RE: Irwin Malloy  [address] [facility] [id number]        Dear Colleague,    Thank you for referring your patient, Irwin Malloy, to the [facility]. Please see a copy of my visit note below.    Diabetes Self-Management Education & Support    Presents for: Insulin Pump and CGM Review for type 1 diabetes    Type of Service: In Person Visit      ASSESSMENT:  Patient was admitted to hospital 10/23/2020 and discharged on 10/28/2024.  Patient admitted with abdominal pain nausea/vomiting.  Found to have a small bowel obstruction and underwent exploratory laparotomy with small bowel resection on 10/23/2024.  Patient abdominal incision site healing well.      Patient currently using tandem X2 insulin pump with Dexcom G7 sensor integrated automated technology.  A1c improving    Patient's most recent   Lab Results   Component Value Date    A1C 7.5 11/11/2024     is meeting goal of <8.0    Diabetes knowledge and skills assessment:   Patient is knowledgeable in diabetes management concepts related to: Healthy Eating, Being Active, Monitoring, Taking Medication, Problem Solving, Reducing Risks, Healthy Coping, and Insulin Pump Concepts Balancing glucose and insulin  Carbohydrate counting  Calculating boluses  Problem solving with insulin pump therapy (BG monitoring; hypoglycemia signs/symptoms, treatment (glucagon) and prevention; hyperglycemia signs/symptoms, treatment and prevention; ketones, DKA signs/symptoms and prevention)  Hands on practice with basic pump button use     Continue education with the following diabetes management concepts: Healthy Eating, Taking Medication, Reducing Risks, and Insulin Pump Concepts adjusting settings      Based on learning assessment above, most appropriate setting for further diabetes education would be: Individual setting.      PLAN    Continue to bolus ~30 min prior to meals if glucose is >120mg/dL     Adjust pump  "setting and continue to adjust as needed to maintain glucose control 70 - 180 goal >70% of the time       Topics to cover at upcoming visits: Taking Medication, Problem Solving, and Insulin Pump Concepts adjusting settings as needed     Follow-up: 4 months     See Care Plan for co-developed, patient-state behavior change goals.  AVS provided for patient today.     Education Materials Provided:  Goals for Your Diabetes Care and Carbohydrate Counting      SUBJECTIVE/OBJECTIVE:  Presents for: Insulin Pump and CGM Review  Accompanied by: Self  Diabetes education in the past 24mo: Yes  Focus of Visit: Insulin Pump  Diabetes type: Type 1  Disease course: (Patient-Rptd) Stable  How confident are you filling out medical forms by yourself:: Extremely  Transportation concerns: No  Difficulty affording diabetes medication?: No  Difficulty affording diabetes testing supplies?: No  Other concerns:: None  Cultural Influences/Ethnic Background:  Not  or     Diabetes Symptoms & Complications:  Diabetes Related Symptoms: (Patient-Rptd) None  Weight trend: (Patient-Rptd) Stable  Symptom course: (Patient-Rptd) Stable  Disease course: (Patient-Rptd) Stable  Complications assessed today?: Yes  Autonomic neuropathy: No  CVA: No  Heart disease: No  Nephropathy: No  Peripheral neuropathy: No  Peripheral Vascular Disease: No  Retinopathy: Yes    Patient Problem List and Family Medical History reviewed for relevant medical history, current medical status, and diabetes risk factors.    Vitals:  Ht 1.702 m (5' 7\")   Wt 83.6 kg (184 lb 6.4 oz)   BMI 28.88 kg/m    Estimated body mass index is 28.88 kg/m  as calculated from the following:    Height as of this encounter: 1.702 m (5' 7\").    Weight as of this encounter: 83.6 kg (184 lb 6.4 oz).   Last 3 BP:   BP Readings from Last 3 Encounters:   04/26/24 126/79   11/08/23 136/83   07/26/23 122/68       History   Smoking Status     Former     Types: Cigarettes   Smokeless Tobacco " "    Never       Labs:  Lab Results   Component Value Date    A1C 7.5 11/11/2024     Lab Results   Component Value Date     11/11/2024     08/19/2021     Lab Results   Component Value Date     11/11/2024     Direct Measure HDL   Date Value Ref Range Status   11/11/2024 60 >=40 mg/dL Final   ]  GFR Estimate   Date Value Ref Range Status   11/11/2024 88 >60 mL/min/1.73m2 Final     Comment:     eGFR calculated using 2021 CKD-EPI equation.     No results found for: \"GFRESTBLACK\"  Lab Results   Component Value Date    CR 0.87 11/11/2024     No results found for: \"MICROALBUMIN\"    Healthy Eating:  Healthy Eating Assessed Today: Yes  Cultural/Confucianism diet restrictions?: (Patient-Rptd) No  Do you have any food allergies or intolerances?: No  Meal planning/habits: Carb counting, Heart healthy, Smaller portions  Who cooks/prepares meals for you?: Spouse, Self  Who purchases food in  your home?: Self, Spouse  How many times a week on average do you eat food made away from home (restaurant/take-out)?: (Patient-Rptd) 1  Meals include: (Patient-Rptd) Breakfast, Lunch, Dinner  Beverages: (Patient-Rptd) Water, Coffee, Milk    Being Active:  Being Active Assessed Today: Yes  Exercise:: Yes  Days per week of moderate to strenuous exercise (like a brisk walk): 4  On average, minutes per day of exercise at this level: 40  How intense was your typical exercise? : Light (like stretching or slow walking)  Exercise Minutes per Week: 160  Barrier to exercise: (Patient-Rptd) Physical limitation    Monitoring:  Monitoring Assessed Today: Yes  Did patient bring glucose meter to appointment? : Yes  Blood Glucose Meter: (Patient-Rptd) CGM  Times checking blood sugar at home (number): Other (see Comments)  Please elaborate::  (CGM continuous)  Times checking blood sugar at home (per): (Patient-Rptd) Day  Blood glucose trend: Fluctuating          Taking Medications:  Diabetes Medication(s)       Insulin       insulin aspart " (NOVOLOG VIAL) 100 UNITS/ML vial INJECT UP TO 70 UNITS DAILY VIA PUMP            Changed basal rates and carb ratio at 6:30am and 11am     Taking Medication Assessed Today: Yes  Current Treatments: (Patient-Rptd) Insulin Pump  Problems taking diabetes medications regularly?: No  Diabetes medication side effects?: No    Problem Solving:  Problem Solving Assessed Today: Yes  Is the patient at risk for hypoglycemia?: Yes  Hypoglycemia Frequency: Weekly  Hypoglycemia Treatment: Glucose (tablets or gel), Juice, Candy  Medical ID: Yes  Does patient have glucagon emergency kit?: No  Is the patient at risk for DKA?: Yes  Does patient have ketone test strips?: No  Does patient have DKA prevention plan?: Yes  Does patient have severe weather/disaster plan for diabetes management?: Yes  Does patient have sick day plan for diabetes management?: Yes    Hypoglycemia Symptoms  Hypoglycemia: Dizziness/Lightheadedness, Feeling shaky, Sweats    Hypoglycemia Complications  Hypoglycemia Complications: None    Reducing Risks:  Reducing Risks Assessed Today: Yes  Diabetes Risks: Age over 45 years, Hyperlipidemia  CAD Risks: Diabetes Mellitus, Male sex, Dyslipidemia  Has dilated eye exam at least once a year?: (Patient-Rptd) No  Sees dentist every 6 months?: (Patient-Rptd) Yes  Feet checked by healthcare provider in the last year?: (Patient-Rptd) Yes    Healthy Coping:  Healthy Coping Assessed Today: Yes  Emotional response to diabetes: Ready to learn  Informal Support system:: (Patient-Rptd) Children, Abbey based, Family, Friends, Neighbors, Spouse  Stage of change: ACTION (Actively working towards change)  Support resources: Websites, In-person Offerings, Exercise  Patient Activation Measure Survey Score:       No data to display                  Care Plan and Education Provided:  Care Plan: Diabetes   Updates made by Rupal White RD since 11/12/2024 12:00 AM        Problem: Diabetes Self-Management Education Needed to Optimize  Self-Care Behaviors         Goal: Healthy Eating - follow a healthy eating pattern for diabetes         Task: Provide education on portion control and consistency in amount, composition and timing of food intake Completed 11/12/2024   Responsible User: Rupal White RD        Task: Provide education on eating out Completed 11/12/2024   Responsible User: Rupal White RD        Goal: Monitoring - monitor glucose and ketones as directed    This Visit's Progress: 90%   Recent Progress: 80%   Note:    Pt to use sensor and BG testing as a back up or prn       Goal: Taking Medication - patient is consistently taking medications as directed         Task: Provide education on insulin and injectable diabetes medications, including administration, storage, site selection and rotation for injection sites Completed 11/12/2024   Responsible User: Rupal White RD        Goal: Problem Solving - know how to prevent and manage short-term diabetes complications         Task: Provide education on safe travel with diabetes Completed 11/12/2024   Responsible User: Rupal White RD        Goal: Reducing Risks - know how to prevent and treat long-term diabetes complications         Task: Provide education on recommendations for heart health - lipid levels and goals, blood pressure and goals, and aspirin therapy, if indicated Completed 11/12/2024   Responsible User: Rupal Wihte RD        Goal: Healthy Coping - use available resources to cope with the challenges of managing diabetes         Task: Discuss methods for coping with stress Completed 11/12/2024   Responsible User: Rupal White RD            Time Spent: 60 minutes  Encounter Type: Individual    Any diabetes medication dose changes were made via the CDE Protocol per the patient's referring provider. A copy of this encounter was shared with the provider.

## 2024-11-13 ENCOUNTER — OFFICE VISIT (OUTPATIENT)
Dept: FAMILY MEDICINE | Facility: CLINIC | Age: 79
End: 2024-11-13
Payer: COMMERCIAL

## 2024-11-13 VITALS
BODY MASS INDEX: 28.88 KG/M2 | HEART RATE: 84 BPM | TEMPERATURE: 97.6 F | SYSTOLIC BLOOD PRESSURE: 135 MMHG | HEIGHT: 67 IN | OXYGEN SATURATION: 99 % | DIASTOLIC BLOOD PRESSURE: 75 MMHG | WEIGHT: 184 LBS | RESPIRATION RATE: 18 BRPM

## 2024-11-13 DIAGNOSIS — E10.3293 TYPE 1 DIABETES MELLITUS WITH MILD NONPROLIFERATIVE RETINOPATHY OF BOTH EYES WITHOUT MACULAR EDEMA (H): ICD-10-CM

## 2024-11-13 DIAGNOSIS — K56.609 SMALL BOWEL OBSTRUCTION (H): ICD-10-CM

## 2024-11-13 DIAGNOSIS — Z00.00 HEALTH CARE MAINTENANCE: ICD-10-CM

## 2024-11-13 DIAGNOSIS — Z23 NEED FOR VACCINATION: Primary | ICD-10-CM

## 2024-11-13 DIAGNOSIS — E10.9 TYPE 1 DIABETES MELLITUS WITHOUT COMPLICATION (H): ICD-10-CM

## 2024-11-13 DIAGNOSIS — E06.3 HYPOTHYROIDISM DUE TO HASHIMOTO'S THYROIDITIS: ICD-10-CM

## 2024-11-13 PROBLEM — M75.02 ADHESIVE CAPSULITIS OF LEFT SHOULDER: Status: RESOLVED | Noted: 2021-08-23 | Resolved: 2024-11-13

## 2024-11-13 SDOH — HEALTH STABILITY: PHYSICAL HEALTH: ON AVERAGE, HOW MANY DAYS PER WEEK DO YOU ENGAGE IN MODERATE TO STRENUOUS EXERCISE (LIKE A BRISK WALK)?: 5 DAYS

## 2024-11-13 ASSESSMENT — SOCIAL DETERMINANTS OF HEALTH (SDOH): HOW OFTEN DO YOU GET TOGETHER WITH FRIENDS OR RELATIVES?: THREE TIMES A WEEK

## 2024-11-13 NOTE — ASSESSMENT & PLAN NOTE
10/2024: Presenting to [facility] with acute abdomen/severe abdominal pain  -Imaging concern for closed-loop small bowel obstruction  -Underwent exploratory laparotomy with small bowel resection; lysis of adhesions -per patient, concerns for developing bowel necrosis  -Subsequent return of bowel function -no prior bowel obstructive episodes

## 2024-11-13 NOTE — ASSESSMENT & PLAN NOTE
Stable levothyroxine dosing  - last TSH, 11/2024 -significantly increased at 15  -Historically with stable TSH levels, question whether more recent lability having to do with his recent illness and hospitalization  -Plans to defer any dose changing for now; recheck TSH in 3 months

## 2024-11-13 NOTE — PROGRESS NOTES
Preventive Care Visit  [facility]  Jose Mcqueen MD, Internal Medicine  Nov 13, 2024      Assessment & Plan   Problem List Items Addressed This Visit          Digestive    Small bowel obstruction (H)     10/2024: Presenting to [facility] with acute abdomen/severe abdominal pain  -Imaging concern for closed-loop small bowel obstruction  -Underwent exploratory laparotomy with small bowel resection; lysis of adhesions -per patient, concerns for developing bowel necrosis  -Subsequent return of bowel function -no prior bowel obstructive episodes            Endocrine    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 (H)     type I DM, controlled, late onset diabetes in adulthood (ROLANDO)  hypoglycemic medications:  insulin therapy:  Tandem pump (initiated in '22; Medtronic prior),  started on Dexcom6 CGM (9/2020), May, 2022 - changed to Tandem G7 sensor with auto-basal features     last HbA1c: 7.5%   microvascular complications: +retinopathy, + BHARTAH x1  macrovascular complications: none  ASA/SIMON/statin:  ASA 81mg daily, losartan 50mg daily, pravastatin 20mg at bedtime    Patient has met criteria for Continuous Glucose Monitoring (CGM) coverage:   - Patient has type one diabetes mellitus; and   - patient has been using a blood glucose monitor and performing four or more blood glucose test per day; and   - patient is insulin treated with multiple daily injections; and   - patient is requiring frequent insulin adjustments on the basis of blood glucose readings.   - Within six months prior to ordering the CGM, the patient has had an in-person visit with the practitioner; and determined that criteria (1-4) above are met; and   - every six months following the initial prescription of the CGM, the treating practitioner has an in-person visit with the patient to assess adherence to their CGM regimen and diabetes treatment plan.               "Relevant Medications    blood glucose (CONTOUR NEXT TEST) test strip    Hypothyroidism due to Hashimoto's thyroiditis     Stable levothyroxine dosing  - last TSH, 11/2024 -significantly increased at 15  -Historically with stable TSH levels, question whether more recent lability having to do with his recent illness and hospitalization  -Plans to defer any dose changing for now; recheck TSH in 3 months         Relevant Orders    TSH       Other    Health care maintenance     CRC screening: -Cologuard positive(5/2019); colonoscopy (6/2019); multiple adenomatous polyps    - last colonoscopy 9/2022: multiple tubular adenomas removed; would plan to repeat in '25   - PSA 0.3 (11/2015) - declines regular future screening   - intentional tremor (worse in AM) - suspect essential tremor; likely worsened by caffeine.  Low suspicions for parkinsons   - fall, 2021 - shingles episode (had not been vaccinated)  Adult vaccinations discussed and updated accordingly          Other Visit Diagnoses       Need for vaccination    -  Primary    Relevant Orders    INFLUENZA HIGH DOSE, TRIVALENT, PF (FLUZONE) (Completed)    COVID-19 12+ (PFIZER) (Completed)    Type 1 diabetes mellitus without complication (H)        Relevant Medications    blood glucose (CONTOUR NEXT TEST) test strip                    BMI  Estimated body mass index is 28.82 kg/m  as calculated from the following:    Height as of this encounter: 1.702 m (5' 7\").    Weight as of this encounter: 83.5 kg (184 lb).       Counseling  Appropriate preventive services were addressed with this patient via screening, questionnaire, or discussion as appropriate for fall prevention, nutrition, physical activity, Tobacco-use cessation, social engagement, weight loss and cognition.  Checklist reviewing preventive services available has been given to the patient.  Reviewed patient's diet, addressing concerns and/or questions.     FUTURE APPOINTMENTS:       - Follow-up visit in 6 " cory Bell   Irwin is a 79 year old, presenting for the following: Presents for Medicare evaluation in addition to recent hospitalization in [place] due to acute small bowel obstruction, closed-loop anatomy -underwent exploratory laparotomy with surgical resection of portion of small bowel.  Had recovery of bowel function and discharged home.  Now approximately 3 weeks status post surgery.  Did see a surgeon for 2-week follow-up without any significant concerns.  He does have significant weight restrictions in place.  Has seen diabetic educator since return.  His insulin pump was temporarily replaced with IV drip during hospitalization -reverted back to his pump sensor on discharge.  Medicare Visit        11/13/2024    11:10 AM   Additional Questions   Roomed by Jyoti ERNST       HPI    Health Care Directive  Patient does not have a Health Care Directive: Patient states has Advance Directive and will bring in a copy to clinic.      11/13/2024   General Health   How would you rate your overall physical health? Good   Feel stress (tense, anxious, or unable to sleep) Not at all            11/13/2024   Nutrition   Diet: Regular (no restrictions)    Diabetic       Multiple values from one day are sorted in reverse-chronological order         11/13/2024   Exercise   Days per week of moderate/strenous exercise 5 days            11/13/2024   Social Factors   Frequency of gathering with friends or relatives Three times a week   Worry food won't last until get money to buy more No   Food not last or not have enough money for food? No   Do you have housing? (Housing is defined as stable permanent housing and does not include staying ouside in a car, in a tent, in an abandoned building, in an overnight shelter, or couch-surfing.) Yes   Are you worried about losing your housing? No   Lack of transportation? No   Unable to get utilities (heat,electricity)? No            11/13/2024   Fall Risk   Fallen 2 or more times  in the past year? No    Trouble with walking or balance? No        Patient-reported          2024   Activities of Daily Living- Home Safety   Needs help with the following daily activites None of the above   Safety concerns in the home None of the above            2024   Dental   Dentist two times every year? Yes            2024   Hearing Screening   Hearing concerns? None of the above            2024   Driving Risk Screening   Patient/family members have concerns about driving No            2024   General Alertness/Fatigue Screening   Have you been more tired than usual lately? (!) DECLINE            2024   Urinary Incontinence Screening   Bothered by leaking urine in past 6 months No            2024   TB Screening   Were you born outside of the [place]? No            Today's PHQ-2 Score:       2024    11:09 AM   PHQ-2 (  [employer])   Q1: Little interest or pleasure in doing things 0    Q2: Feeling down, depressed or hopeless 0    PHQ-2 Score 0    Q1: Little interest or pleasure in doing things Not at all   Q2: Feeling down, depressed or hopeless Not at all   PHQ-2 Score 0       Patient-reported           2024   Substance Use   Alcohol more than 3/day or more than 7/wk Not Applicable   Do you have a current opioid prescription? No   How severe/bad is pain from 1 to 10? 0/10 (No Pain)   Do you use any other substances recreationally? No        Social History     Tobacco Use    Smoking status: Former     Current packs/day: 0.00     Types: Cigarettes     Quit date: 2008     Years since quittin.8     Passive exposure: Past    Smokeless tobacco: Never   Vaping Use    Vaping status: Never Used       ASCVD Risk   The 10-year ASCVD risk score (Benji REECE, et al., 2019) is: 50.5%    Values used to calculate the score:      Age: 79 years      Sex: Male      Is Non- : No      Diabetic: Yes      Tobacco smoker: No      Systolic Blood  "Pressure: 135 mmHg      Is BP treated: No      HDL Cholesterol: 60 mg/dL      Total Cholesterol: 203 mg/dL          Reviewed and updated as needed this visit by Provider                    Current providers sharing in care for this patient include:  Patient Care Team:  Jose Mcqueen MD as PCP - General (HOSPITALIST)  Jose Mcqueen MD as Assigned PCP  Rupal White RD as Diabetes Educator    The following health maintenance items are reviewed in Epic and correct as of today:  Health Maintenance   Topic Date Due    ADVANCE CARE PLANNING  Never done    ZOSTER IMMUNIZATION (1 of 2) Never done    MEDICARE ANNUAL WELLNESS VISIT  Never done    DTAP/TDAP/TD IMMUNIZATION (3 - Td or Tdap) 07/26/2012    RSV VACCINE (1 - 1-dose 75+ series) Never done    INFLUENZA VACCINE (1) 09/01/2024    COVID-19 Vaccine (5 - 2024-25 season) 09/01/2024    EYE EXAM  09/28/2024    DIABETIC FOOT EXAM  11/08/2024    A1C  02/11/2025    ANNUAL REVIEW OF HM ORDERS  04/26/2025    BMP  11/11/2025    LIPID  11/11/2025    MICROALBUMIN  11/11/2025    TSH W/FREE T4 REFLEX  11/11/2025    FALL RISK ASSESSMENT  11/13/2025    HEPATITIS C SCREENING  Completed    PHQ-2 (once per calendar year)  Completed    Pneumococcal Vaccine: 65+ Years  Completed    HPV IMMUNIZATION  Aged Out    MENINGITIS IMMUNIZATION  Aged Out    RSV MONOCLONAL ANTIBODY  Aged Out    COLORECTAL CANCER SCREENING  Discontinued         Review of Systems  Constitutional, HEENT, cardiovascular, pulmonary, gi and gu systems are negative, except as otherwise noted.     Objective    Exam  /75   Pulse 84   Temp 97.6  F (36.4  C)   Resp 18   Ht 1.702 m (5' 7\")   Wt 83.5 kg (184 lb)   SpO2 99%   BMI 28.82 kg/m     Estimated body mass index is 28.82 kg/m  as calculated from the following:    Height as of this encounter: 1.702 m (5' 7\").    Weight as of this encounter: 83.5 kg (184 lb).    Physical Exam  GENERAL: alert and no distress  NECK: no adenopathy, no asymmetry, masses, or " scars  RESP: lungs clear to auscultation - no rales, rhonchi or wheezes  CV: regular rate and rhythm, normal S1 S2, no S3 or S4, no murmur, click or rub, no peripheral edema  ABDOMEN: soft, nontender, no hepatosplenomegaly, no masses and bowel sounds normal.  Laparotomy incision well-healed; well-approximated without any drainage  MS: no gross musculoskeletal defects noted, no edema        11/13/2024   Mini Cog   Mini-Cog Not Completed (choose reason) Patient declines          Patient declines, there are NO concerns for cognitive deficits.           Signed Electronically by: Jose Mcqueen MD 11/13/2024

## 2024-11-13 NOTE — ASSESSMENT & PLAN NOTE
type I DM, controlled, late onset diabetes in adulthood (ROLANDO)  hypoglycemic medications:  insulin therapy:  Tandem pump (initiated in '22; Medtronic prior),  started on Dexcom6 CGM (9/2020), May, 2022 - changed to Tandem G7 sensor with auto-basal features     last HbA1c: 7.5%   microvascular complications: +retinopathy, + BHARATH x1  macrovascular complications: none  ASA/SIMON/statin:  ASA 81mg daily, losartan 50mg daily, pravastatin 20mg at bedtime    Patient has met criteria for Continuous Glucose Monitoring (CGM) coverage:   - Patient has type one diabetes mellitus; and   - patient has been using a blood glucose monitor and performing four or more blood glucose test per day; and   - patient is insulin treated with multiple daily injections; and   - patient is requiring frequent insulin adjustments on the basis of blood glucose readings.   - Within six months prior to ordering the CGM, the patient has had an in-person visit with the practitioner; and determined that criteria (1-4) above are met; and   - every six months following the initial prescription of the CGM, the treating practitioner has an in-person visit with the patient to assess adherence to their CGM regimen and diabetes treatment plan.

## 2024-11-18 ENCOUNTER — TRANSFERRED RECORDS (OUTPATIENT)
Dept: HEALTH INFORMATION MANAGEMENT | Facility: CLINIC | Age: 79
End: 2024-11-18

## 2024-11-18 LAB — RETINOPATHY: POSITIVE

## 2024-11-20 ENCOUNTER — TELEPHONE (OUTPATIENT)
Dept: FAMILY MEDICINE | Facility: CLINIC | Age: 79
End: 2024-11-20
Payer: COMMERCIAL

## 2024-11-20 NOTE — TELEPHONE ENCOUNTER
General Call      Reason for Call:  patient is trying to get control over constipation, would like to speak with PCP    What are your questions or concerns:  concern about constipation    Date of last appointment with provider: 11/13/2024    Could we send this information to you in [employer] or would you prefer to receive a phone call?:   Patient would prefer a phone call   Okay to leave a detailed message?: Yes at Cell number on file:    Telephone Information:   Mobile [phone]

## 2024-11-27 ENCOUNTER — TELEPHONE (OUTPATIENT)
Dept: FAMILY MEDICINE | Facility: CLINIC | Age: 79
End: 2024-11-27
Payer: COMMERCIAL

## 2024-11-27 NOTE — TELEPHONE ENCOUNTER
Forms/Letter Request    Type of form/letter: OTHER: PT order        Do we have the form/letter: Yes: BRM box    Who is the form from? [facility] (if other please explain)    Where did/will the form come from? form was faxed in    When is form/letter needed by: soon    How would you like the form/letter returned: Fax : [phone]

## 2024-12-04 ENCOUNTER — TELEPHONE (OUTPATIENT)
Dept: FAMILY MEDICINE | Facility: CLINIC | Age: 79
End: 2024-12-04
Payer: COMMERCIAL

## 2024-12-04 NOTE — TELEPHONE ENCOUNTER
Forms/Letter Request    Type of form/letter: Diabetic Supplies      Do we have the form/letter: Yes: BRM Box    Who is the form from? formerly [facility] (if other please explain)    Where did/will the form come from? form was faxed in    When is form/letter needed by: soon

## 2024-12-06 ENCOUNTER — TRANSFERRED RECORDS (OUTPATIENT)
Dept: HEALTH INFORMATION MANAGEMENT | Facility: CLINIC | Age: 79
End: 2024-12-06
Payer: COMMERCIAL

## 2024-12-23 ENCOUNTER — TELEPHONE (OUTPATIENT)
Dept: FAMILY MEDICINE | Facility: CLINIC | Age: 79
End: 2024-12-23
Payer: COMMERCIAL

## 2024-12-23 DIAGNOSIS — K21.9 GASTROESOPHAGEAL REFLUX DISEASE WITHOUT ESOPHAGITIS: ICD-10-CM

## 2024-12-23 DIAGNOSIS — E10.9 TYPE 1 DIABETES MELLITUS WITHOUT COMPLICATION (H): ICD-10-CM

## 2024-12-23 DIAGNOSIS — E06.3 HYPOTHYROIDISM DUE TO HASHIMOTO'S THYROIDITIS: ICD-10-CM

## 2024-12-23 DIAGNOSIS — E78.00 PURE HYPERCHOLESTEROLEMIA: ICD-10-CM

## 2024-12-23 RX ORDER — INSULIN ASPART 100 [IU]/ML
INJECTION, SOLUTION INTRAVENOUS; SUBCUTANEOUS
Qty: 90 ML | Refills: 1 | Status: SHIPPED | OUTPATIENT
Start: 2024-12-23

## 2024-12-23 RX ORDER — LOSARTAN POTASSIUM 50 MG/1
50 TABLET ORAL DAILY
Qty: 90 TABLET | Refills: 1 | Status: SHIPPED | OUTPATIENT
Start: 2024-12-23

## 2024-12-23 RX ORDER — LEVOTHYROXINE SODIUM 175 UG/1
175 TABLET ORAL DAILY
Qty: 90 TABLET | Refills: 1 | Status: SHIPPED | OUTPATIENT
Start: 2024-12-23

## 2024-12-23 RX ORDER — PRAVASTATIN SODIUM 20 MG
20 TABLET ORAL AT BEDTIME
Qty: 90 TABLET | Refills: 1 | Status: SHIPPED | OUTPATIENT
Start: 2024-12-23

## 2024-12-23 RX ORDER — OMEPRAZOLE 20 MG/1
20 TABLET, DELAYED RELEASE ORAL DAILY
Qty: 90 TABLET | Refills: 1 | Status: SHIPPED | OUTPATIENT
Start: 2024-12-23

## 2024-12-23 NOTE — TELEPHONE ENCOUNTER
Pt returning AJ Lenz's call to confirm that at beginning of New Year Pharmacy will change to [facility]. Pt will need last remaining refill of Rx's sent to new pharmacy.      Pt will have ONE 90 day supply that will need to be sent to new pharmacy.

## 2024-12-23 NOTE — TELEPHONE ENCOUNTER
Received fax from [facility] re: refills for:    Levothyroxine 175 mcg tablet  2.   Losartan 50 mg tablet  3.   Pravastatin 20 mg tablet  4.   Omeprazole 20 mg capsule, delayed release  5.   Novolog U-100 aspart 100 unit/mL subcutaneous sol'n      CSS called/LM for pt to verify if he is now wanting rx refills to go to mail order pharmacy vs [facility]

## 2024-12-27 DIAGNOSIS — E06.3 HYPOTHYROIDISM DUE TO HASHIMOTO'S THYROIDITIS: ICD-10-CM

## 2024-12-27 DIAGNOSIS — E78.00 PURE HYPERCHOLESTEROLEMIA: ICD-10-CM

## 2024-12-27 DIAGNOSIS — K21.9 GASTROESOPHAGEAL REFLUX DISEASE WITHOUT ESOPHAGITIS: ICD-10-CM

## 2024-12-27 RX ORDER — PRAVASTATIN SODIUM 20 MG
20 TABLET ORAL AT BEDTIME
Qty: 21 TABLET | Refills: 0 | Status: SHIPPED | OUTPATIENT
Start: 2024-12-27

## 2024-12-27 RX ORDER — OMEPRAZOLE 20 MG/1
20 TABLET, DELAYED RELEASE ORAL DAILY
Qty: 21 TABLET | Refills: 0 | Status: SHIPPED | OUTPATIENT
Start: 2024-12-27

## 2024-12-27 NOTE — TELEPHONE ENCOUNTER
Patient calling because he tried to fill his prescriptions one last time at [facility] before he travels to [place] from Jan 1st - April 15th, however, the prescriptions were all discontinued and patient unable to fill.      RN called [facility] where patient requested prescriptions come from while in [place].  They will not have prescriptions available until approximately Jan. 7-10th.      Patient needs approximately a 3 week bridge until his prescriptions are sent to him in [place].      RN pended the requested medications for provider approval.     Nidhi Lang RN

## 2024-12-30 RX ORDER — LEVOTHYROXINE SODIUM 175 UG/1
175 TABLET ORAL DAILY
Qty: 21 TABLET | Refills: 0 | Status: SHIPPED | OUTPATIENT
Start: 2024-12-30

## 2024-12-30 RX ORDER — LOSARTAN POTASSIUM 50 MG/1
50 TABLET ORAL DAILY
Qty: 21 TABLET | Refills: 0 | Status: SHIPPED | OUTPATIENT
Start: 2024-12-30

## 2025-01-18 DIAGNOSIS — E78.00 PURE HYPERCHOLESTEROLEMIA: ICD-10-CM

## 2025-01-18 DIAGNOSIS — E06.3 HYPOTHYROIDISM DUE TO HASHIMOTO'S THYROIDITIS: ICD-10-CM

## 2025-01-20 RX ORDER — LOSARTAN POTASSIUM 50 MG/1
50 TABLET ORAL DAILY
Qty: 21 TABLET | Refills: 0 | Status: SHIPPED | OUTPATIENT
Start: 2025-01-20 | End: 2025-01-23

## 2025-01-20 RX ORDER — PRAVASTATIN SODIUM 20 MG
20 TABLET ORAL AT BEDTIME
Qty: 90 TABLET | Refills: 2 | Status: SHIPPED | OUTPATIENT
Start: 2025-01-20

## 2025-01-20 RX ORDER — LEVOTHYROXINE SODIUM 175 UG/1
175 TABLET ORAL DAILY
Qty: 21 TABLET | Refills: 0 | Status: SHIPPED | OUTPATIENT
Start: 2025-01-20 | End: 2025-01-23

## 2025-01-23 ENCOUNTER — TELEPHONE (OUTPATIENT)
Dept: FAMILY MEDICINE | Facility: CLINIC | Age: 80
End: 2025-01-23
Payer: COMMERCIAL

## 2025-01-23 DIAGNOSIS — E78.00 PURE HYPERCHOLESTEROLEMIA: ICD-10-CM

## 2025-01-23 DIAGNOSIS — E06.3 HYPOTHYROIDISM DUE TO HASHIMOTO'S THYROIDITIS: ICD-10-CM

## 2025-01-23 RX ORDER — LEVOTHYROXINE SODIUM 175 UG/1
175 TABLET ORAL DAILY
Qty: 90 TABLET | Refills: 0 | Status: SHIPPED | OUTPATIENT
Start: 2025-01-23

## 2025-01-23 RX ORDER — LOSARTAN POTASSIUM 50 MG/1
50 TABLET ORAL DAILY
Qty: 90 TABLET | Refills: 0 | Status: SHIPPED | OUTPATIENT
Start: 2025-01-23

## 2025-01-23 RX ORDER — LOSARTAN POTASSIUM 50 MG/1
50 TABLET ORAL DAILY
Qty: 21 TABLET | Refills: 0 | Status: SHIPPED | OUTPATIENT
Start: 2025-01-23 | End: 2025-01-23

## 2025-01-23 RX ORDER — LEVOTHYROXINE SODIUM 175 UG/1
175 TABLET ORAL DAILY
Qty: 21 TABLET | Refills: 0 | Status: SHIPPED | OUTPATIENT
Start: 2025-01-23 | End: 2025-01-23

## 2025-01-23 NOTE — TELEPHONE ENCOUNTER
Patient is asking that the Rx of levothyroxine (SYNTHROID/LEVOTHROID) 175 MCG tablet  and     losartan (COZAAR) 50 MG tablet     That were sent to [facility] be canceled and sent to   [facility] - [place], [place] - [address] [phone]     Patient is living in [place] until April.

## 2025-01-23 NOTE — TELEPHONE ENCOUNTER
01/23/25  General Call      Reason for Call:  3 month supply requests    What are your questions or concerns:  Pt is requesting losartan (COZAAR) 50 MG tablet and levothyroxine (SYNTHROID/LEVOTHROID) 175 MCG tablet in 3 month quantities sent to the [facility]. Pt is in [place] for the next 3 months and is running low.     Pt states he has been working with Delicia to straighten out his medications and would like a call back from her once completed.     Date of last appointment with provider: 11/13/24    Could we send this information to you in MeilleurMobile or would you prefer to receive a phone call?:   Patient would prefer a phone call   Okay to leave a detailed message?: Yes at Cell number on file:    Telephone Information:   Mobile [phone]

## 2025-01-23 NOTE — TELEPHONE ENCOUNTER
Please abstract the following data from this visit with this patient into the appropriate field in Epic:        Other Tests found in the patient's chart through Chart Review/Care Everywhere:    Glucose 220 and C-Peptide <0.10 L done by this group [facility] on this date: 4/20/2017    Note to Abstraction: Paper copy of result will be scanned into pt's chart. Please add results to pt's flowsheet.

## 2025-01-29 DIAGNOSIS — E78.00 PURE HYPERCHOLESTEROLEMIA: ICD-10-CM

## 2025-01-29 DIAGNOSIS — K21.9 GASTROESOPHAGEAL REFLUX DISEASE WITHOUT ESOPHAGITIS: Primary | ICD-10-CM

## 2025-01-29 RX ORDER — PRAVASTATIN SODIUM 20 MG
20 TABLET ORAL DAILY
Qty: 90 TABLET | Refills: 3 | Status: SHIPPED | OUTPATIENT
Start: 2025-01-29

## 2025-02-19 ENCOUNTER — TELEPHONE (OUTPATIENT)
Dept: FAMILY MEDICINE | Facility: CLINIC | Age: 80
End: 2025-02-19
Payer: COMMERCIAL

## 2025-02-19 NOTE — TELEPHONE ENCOUNTER
Patient Quality Outreach    Patient is due for the following:   Diabetes -  Eye Exam and Foot Exam    Action(s) Taken:   I contacted Bailey's office, patient was seen 11/2024. Office is faxing notes over to clinic.     Type of outreach:    See above notes.    Questions for provider review:    None           Lurdes Raya MA

## 2025-03-09 ENCOUNTER — HEALTH MAINTENANCE LETTER (OUTPATIENT)
Age: 80
End: 2025-03-09

## 2025-04-03 DIAGNOSIS — E06.3 HYPOTHYROIDISM DUE TO HASHIMOTO'S THYROIDITIS: ICD-10-CM

## 2025-04-03 DIAGNOSIS — E78.00 PURE HYPERCHOLESTEROLEMIA: ICD-10-CM

## 2025-04-03 RX ORDER — LEVOTHYROXINE SODIUM 175 UG/1
175 TABLET ORAL DAILY
Qty: 90 TABLET | Refills: 3 | Status: SHIPPED | OUTPATIENT
Start: 2025-04-03

## 2025-04-03 RX ORDER — LOSARTAN POTASSIUM 50 MG/1
50 TABLET ORAL DAILY
Qty: 90 TABLET | Refills: 3 | Status: SHIPPED | OUTPATIENT
Start: 2025-04-03

## 2025-04-03 NOTE — TELEPHONE ENCOUNTER
TSH   Date Value Ref Range Status   11/11/2024 15.10 (H) 0.30 - 4.20 uIU/mL Final   11/16/2021 1.68 0.40 - 4.50 mIU/L Final     Comment:     Lab test performed by:  Lab Mnemonic:JUAN RAMON  [facility]  [address] [place] [address]  Sarabjit Jennings M.D.  QUEST Specimen received date and time: 17-NOV-2021 06:09:00.00

## 2025-04-14 ENCOUNTER — PATIENT OUTREACH (OUTPATIENT)
Dept: CARE COORDINATION | Facility: CLINIC | Age: 80
End: 2025-04-14
Payer: COMMERCIAL

## 2025-05-01 ENCOUNTER — TRANSFERRED RECORDS (OUTPATIENT)
Dept: HEALTH INFORMATION MANAGEMENT | Facility: CLINIC | Age: 80
End: 2025-05-01
Payer: COMMERCIAL

## 2025-05-09 PROCEDURE — 84681 ASSAY OF C-PEPTIDE: CPT | Performed by: INTERNAL MEDICINE

## 2025-05-09 PROCEDURE — 84443 ASSAY THYROID STIM HORMONE: CPT | Performed by: INTERNAL MEDICINE

## 2025-05-11 ENCOUNTER — HEALTH MAINTENANCE LETTER (OUTPATIENT)
Age: 80
End: 2025-05-11

## 2025-05-13 ENCOUNTER — OFFICE VISIT (OUTPATIENT)
Dept: FAMILY MEDICINE | Facility: CLINIC | Age: 80
End: 2025-05-13
Payer: COMMERCIAL

## 2025-05-13 VITALS
HEART RATE: 66 BPM | OXYGEN SATURATION: 98 % | HEIGHT: 67 IN | WEIGHT: 194 LBS | DIASTOLIC BLOOD PRESSURE: 78 MMHG | RESPIRATION RATE: 16 BRPM | BODY MASS INDEX: 30.45 KG/M2 | TEMPERATURE: 97.4 F | SYSTOLIC BLOOD PRESSURE: 132 MMHG

## 2025-05-13 DIAGNOSIS — Z86.0100 HISTORY OF COLONIC POLYPS: ICD-10-CM

## 2025-05-13 DIAGNOSIS — Z12.11 SCREENING FOR COLORECTAL CANCER: ICD-10-CM

## 2025-05-13 DIAGNOSIS — E10.3293 TYPE 1 DIABETES MELLITUS WITH MILD NONPROLIFERATIVE RETINOPATHY OF BOTH EYES WITHOUT MACULAR EDEMA (H): Primary | ICD-10-CM

## 2025-05-13 DIAGNOSIS — Z12.12 SCREENING FOR COLORECTAL CANCER: ICD-10-CM

## 2025-05-13 DIAGNOSIS — K56.609 SMALL BOWEL OBSTRUCTION (H): ICD-10-CM

## 2025-05-13 DIAGNOSIS — Z00.00 HEALTH CARE MAINTENANCE: ICD-10-CM

## 2025-05-13 DIAGNOSIS — K43.9 VENTRAL HERNIA WITHOUT OBSTRUCTION OR GANGRENE: ICD-10-CM

## 2025-05-13 DIAGNOSIS — E06.3 HYPOTHYROIDISM DUE TO HASHIMOTO'S THYROIDITIS: ICD-10-CM

## 2025-05-13 LAB
EST. AVERAGE GLUCOSE BLD GHB EST-MCNC: 160 MG/DL
HBA1C MFR BLD: 7.2 % (ref 0–5.6)

## 2025-05-13 PROCEDURE — 3078F DIAST BP <80 MM HG: CPT | Performed by: INTERNAL MEDICINE

## 2025-05-13 PROCEDURE — 3075F SYST BP GE 130 - 139MM HG: CPT | Performed by: INTERNAL MEDICINE

## 2025-05-13 PROCEDURE — 83036 HEMOGLOBIN GLYCOSYLATED A1C: CPT | Performed by: INTERNAL MEDICINE

## 2025-05-13 PROCEDURE — 36415 COLL VENOUS BLD VENIPUNCTURE: CPT | Performed by: INTERNAL MEDICINE

## 2025-05-13 PROCEDURE — G2211 COMPLEX E/M VISIT ADD ON: HCPCS | Performed by: INTERNAL MEDICINE

## 2025-05-13 PROCEDURE — 99214 OFFICE O/P EST MOD 30 MIN: CPT | Performed by: INTERNAL MEDICINE

## 2025-05-13 RX ORDER — LEVOTHYROXINE SODIUM 200 UG/1
200 TABLET ORAL DAILY
Qty: 90 TABLET | Refills: 3 | Status: SHIPPED | OUTPATIENT
Start: 2025-05-13

## 2025-05-13 NOTE — ASSESSMENT & PLAN NOTE
CRC screening: -Cologuard positive(5/2019); colonoscopy (6/2019); multiple adenomatous polyps    - last colonoscopy 9/2022: multiple tubular adenomas removed -due for repeat colonoscopy now (given propensity of polyp development would continue with active screening)   - PSA 0.3 (11/2015) - declines regular future screening   - fall, 2021 - shingles episode (had not been vaccinated)  Adult vaccinations discussed and updated accordingly

## 2025-05-13 NOTE — PROGRESS NOTES
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 (H) - Primary    type I DM, controlled, late onset diabetes in adulthood (ROLANDO)  hypoglycemic medications:  insulin therapy:  Tandem pump (initiated in '22; Medtronic prior),  started on Dexcom6 CGM (9/2020), May, 2022 - changed to Tandem G7 sensor with auto-basal features   - needs to avoid late-evening rebolusing  last HbA1c: 7.5%   microvascular complications: +retinopathy, + BHARATH x1  macrovascular complications: none  ASA/SIMON/statin:  ASA 81mg daily, losartan 50mg daily, pravastatin 20mg at bedtime    Patient has met criteria for Continuous Glucose Monitoring (CGM) coverage:   - Patient has type one diabetes mellitus; and   - patient has been using a blood glucose monitor and performing four or more blood glucose test per day; and   - patient is insulin treated with multiple daily injections; and   - patient is requiring frequent insulin adjustments on the basis of blood glucose readings.   - Within six months prior to ordering the CGM, the patient has had an in-person visit with the practitioner; and determined that criteria (1-4) above are met; and   - every six months following the initial prescription of the CGM, the treating practitioner has an in-person visit with the patient to assess adherence to their CGM regimen and diabetes treatment plan.              Relevant Medications    levothyroxine  "(SYNTHROID/LEVOTHROID) 200 MCG tablet    Other Relevant Orders    HEMOGLOBIN A1C    Hypothyroidism due to Hashimoto's thyroiditis    Stable levothyroxine dosing  - last TSH, 5/2025 -persistently increased increased at 11  - Dose increased from 175 mcg to 200 mcg daily  -Plan recheck TSH in 6 months         Relevant Medications    levothyroxine (SYNTHROID/LEVOTHROID) 200 MCG tablet    Other Relevant Orders    TSH       Other    Health care maintenance    CRC screening: -Cologuard positive(5/2019); colonoscopy (6/2019); multiple adenomatous polyps    - last colonoscopy 9/2022: multiple tubular adenomas removed -due for repeat colonoscopy now (given propensity of polyp development would continue with active screening)   - PSA 0.3 (11/2015) - declines regular future screening   - fall, 2021 - shingles episode (had not been vaccinated)  Adult vaccinations discussed and updated accordingly         Relevant Orders    REVIEW OF HEALTH MAINTENANCE PROTOCOL ORDERS (Completed)    Ventral hernia    5/2025: Bulging in abdomen -looks consistent with ventral wall hernia  -Recommendations to monitor for now; no other observed symptoms            Other Visit Diagnoses         Screening for colorectal cancer        Relevant Orders    Colonoscopy Screening  Referral      History of colonic polyps        Relevant Orders    Colonoscopy Screening  Referral                  BMI  Estimated body mass index is 30.38 kg/m  as calculated from the following:    Height as of this encounter: 1.702 m (5' 7\").    Weight as of this encounter: 88 kg (194 lb).   Weight management plan: Discussed healthy diet and exercise guidelines    Follow-up    Follow-up Visit   Expected date:  Nov 13, 2025 (Approximate)      Follow Up Appointment Details:     Follow-up with whom?: Me    Follow-Up for what?: Chronic Disease f/u    Chronic Disease f/u: Diabetes    How?: In Person                 Arabella   Irwin is a 80 year old, presenting for " "the following health issues: Returns for follow-up related to his diabetes.  Has a rather uneventful winter in [place].  More regular predictable bowel motility; taking Metamucil and dietary higher fiber diet.  Has noticed some bulging in his abdomen, questions possible ventral hernia -no associated pain.  Doing well with this pump sensor; occasional evening hypos typically around midnight to 1 in the morning; generally well responsive to the evening snack.  Bothered by degree of weight gain since his laparotomy surgeries last year  Diabetes        5/13/2025    10:07 AM   Additional Questions   Roomed by Jyoti ERNST     History of Present Illness       Diabetes:   He presents for follow up of diabetes.   He is checking home blood glucose with a continuous glucose monitor.   He checks blood glucose before meals, after meals, before and after meals and at bedtime.  Blood glucose is sometimes over 200 and sometimes under 70.  When his blood glucose is low, the patient is asymptomatic for confusion, blurred vision, lethargy and reports not feeling dizzy, shaky, or weak.   He has no concerns regarding his diabetes at this time.   He is not experiencing numbness or burning in feet, excessive thirst, blurry vision, weight changes or redness, sores or blisters on feet.           He eats 2-3 servings of fruits and vegetables daily.He consumes 0 sweetened beverage(s) daily.He exercises with enough effort to increase his heart rate 30 to 60 minutes per day.  He exercises with enough effort to increase his heart rate 7 days per week.   He is taking medications regularly.            Review of Systems  Constitutional, HEENT, cardiovascular, pulmonary, gi and gu systems are negative, except as otherwise noted.      Objective    BP (!) 154/76   Pulse 66   Temp 97.4  F (36.3  C)   Resp 16   Ht 1.702 m (5' 7\")   Wt 88 kg (194 lb)   SpO2 98%   BMI 30.38 kg/m    Body mass index is 30.38 kg/m .  Physical Exam   GENERAL: alert " and no distress  NECK: no adenopathy, no asymmetry, masses, or scars  RESP: lungs clear to auscultation - no rales, rhonchi or wheezes  CV: regular rate and rhythm, normal S1 S2, no S3 or S4, no murmur, click or rub, no peripheral edema  ABDOMEN: soft, nontender, no hepatosplenomegaly, no masses and bowel sounds normal.  Reproducible area of bulging more noticeable with Valsalva remover along the left ventral abdominal wall  MS: no gross musculoskeletal defects noted, no edema    Lab on 05/09/2025   Component Date Value Ref Range Status    TSH 05/09/2025 11.90 (H)  0.30 - 4.20 uIU/mL Final    C Peptide 05/09/2025 <0.1 (L)  0.9 - 6.9 ng/mL Final    Reference range applies to fasting specimens.    Patient Fasting > 8hrs? 05/09/2025 Yes   Final           Signed Electronically by: Jose Mcqueen MD

## 2025-05-13 NOTE — ASSESSMENT & PLAN NOTE
Stable levothyroxine dosing  - last TSH, 5/2025 -persistently increased increased at 11  - Dose increased from 175 mcg to 200 mcg daily  -Plan recheck TSH in 6 months

## 2025-05-13 NOTE — ASSESSMENT & PLAN NOTE
5/2025: Bulging in abdomen -looks consistent with ventral wall hernia  -Recommendations to monitor for now; no other observed symptoms

## 2025-05-13 NOTE — ASSESSMENT & PLAN NOTE
10/2024: Presenting to [facility] with acute abdomen/severe abdominal pain  -Imaging concern for closed-loop small bowel obstruction  -Underwent exploratory laparotomy with small bowel resection; lysis of adhesions -per patient, concerns for developing bowel necrosis  -Subsequent return of bowel function -no prior bowel obstructive episodes  - doing well since

## 2025-05-13 NOTE — ASSESSMENT & PLAN NOTE
type I DM, controlled, late onset diabetes in adulthood (ROLANDO)  hypoglycemic medications:  insulin therapy:  Tandem pump (initiated in '22; [employer]tr[employer] prior),  started on Dexcom6 CGM (9/2020), May, 2022 - changed to Tandem G7 sensor with auto-basal features   - needs to avoid late-evening rebolusing  last HbA1c: 7.5%   microvascular complications: +retinopathy, + BHARATH x1  macrovascular complications: none  ASA/SIMON/statin:  ASA 81mg daily, losartan 50mg daily, pravastatin 20mg at bedtime    Patient has met criteria for Continuous Glucose Monitoring (CGM) coverage:   - Patient has type one diabetes mellitus; and   - patient has been using a blood glucose monitor and performing four or more blood glucose test per day; and   - patient is insulin treated with multiple daily injections; and   - patient is requiring frequent insulin adjustments on the basis of blood glucose readings.   - Within six months prior to ordering the CGM, the patient has had an in-person visit with the practitioner; and determined that criteria (1-4) above are met; and   - every six months following the initial prescription of the CGM, the treating practitioner has an in-person visit with the patient to assess adherence to their CGM regimen and diabetes treatment plan.

## 2025-05-14 ENCOUNTER — RESULTS FOLLOW-UP (OUTPATIENT)
Dept: FAMILY MEDICINE | Facility: CLINIC | Age: 80
End: 2025-05-14

## 2025-05-19 ENCOUNTER — OFFICE VISIT (OUTPATIENT)
Dept: FAMILY MEDICINE | Facility: CLINIC | Age: 80
End: 2025-05-19
Payer: COMMERCIAL

## 2025-05-19 ENCOUNTER — TELEPHONE (OUTPATIENT)
Dept: FAMILY MEDICINE | Facility: CLINIC | Age: 80
End: 2025-05-19

## 2025-05-19 VITALS
TEMPERATURE: 98.8 F | RESPIRATION RATE: 16 BRPM | WEIGHT: 192.1 LBS | HEART RATE: 105 BPM | HEIGHT: 67 IN | DIASTOLIC BLOOD PRESSURE: 60 MMHG | OXYGEN SATURATION: 97 % | BODY MASS INDEX: 30.15 KG/M2 | SYSTOLIC BLOOD PRESSURE: 104 MMHG

## 2025-05-19 DIAGNOSIS — E10.3293 TYPE 1 DIABETES MELLITUS WITH MILD NONPROLIFERATIVE RETINOPATHY OF BOTH EYES WITHOUT MACULAR EDEMA (H): Primary | ICD-10-CM

## 2025-05-19 DIAGNOSIS — U07.1 CLINICAL DIAGNOSIS OF COVID-19: ICD-10-CM

## 2025-05-19 DIAGNOSIS — D72.821 MONOCYTOSIS: ICD-10-CM

## 2025-05-19 DIAGNOSIS — J02.9 SORE THROAT: ICD-10-CM

## 2025-05-19 LAB
BASOPHILS # BLD AUTO: 0 10E3/UL (ref 0–0.2)
BASOPHILS NFR BLD AUTO: 0 %
EOSINOPHIL # BLD AUTO: 0.1 10E3/UL (ref 0–0.7)
EOSINOPHIL NFR BLD AUTO: 1 %
ERYTHROCYTE [DISTWIDTH] IN BLOOD BY AUTOMATED COUNT: 14.2 % (ref 10–15)
HCT VFR BLD AUTO: 38.4 % (ref 40–53)
HGB BLD-MCNC: 13.1 G/DL (ref 13.3–17.7)
IMM GRANULOCYTES # BLD: 0 10E3/UL
IMM GRANULOCYTES NFR BLD: 0 %
LYMPHOCYTES # BLD AUTO: 1.7 10E3/UL (ref 0.8–5.3)
LYMPHOCYTES NFR BLD AUTO: 16 %
MCH RBC QN AUTO: 31.9 PG (ref 26.5–33)
MCHC RBC AUTO-ENTMCNC: 34.1 G/DL (ref 31.5–36.5)
MCV RBC AUTO: 93 FL (ref 78–100)
MONOCYTES # BLD AUTO: 2.2 10E3/UL (ref 0–1.3)
MONOCYTES NFR BLD AUTO: 20 %
NEUTROPHILS # BLD AUTO: 6.7 10E3/UL (ref 1.6–8.3)
NEUTROPHILS NFR BLD AUTO: 62 %
NRBC # BLD AUTO: 0 10E3/UL
NRBC BLD AUTO-RTO: 0 /100
PLAT MORPH BLD: NORMAL
PLATELET # BLD AUTO: 185 10E3/UL (ref 150–450)
RBC # BLD AUTO: 4.11 10E6/UL (ref 4.4–5.9)
RBC MORPH BLD: NORMAL
WBC # BLD AUTO: 10.7 10E3/UL (ref 4–11)

## 2025-05-19 PROCEDURE — 99214 OFFICE O/P EST MOD 30 MIN: CPT | Performed by: FAMILY MEDICINE

## 2025-05-19 PROCEDURE — 3074F SYST BP LT 130 MM HG: CPT | Performed by: FAMILY MEDICINE

## 2025-05-19 PROCEDURE — 3078F DIAST BP <80 MM HG: CPT | Performed by: FAMILY MEDICINE

## 2025-05-19 PROCEDURE — 36415 COLL VENOUS BLD VENIPUNCTURE: CPT | Performed by: FAMILY MEDICINE

## 2025-05-19 PROCEDURE — 85025 COMPLETE CBC W/AUTO DIFF WBC: CPT | Performed by: FAMILY MEDICINE

## 2025-05-19 RX ORDER — BENZONATATE 100 MG/1
100 CAPSULE ORAL 3 TIMES DAILY PRN
Qty: 30 CAPSULE | Refills: 0 | Status: SHIPPED | OUTPATIENT
Start: 2025-05-19

## 2025-05-19 NOTE — TELEPHONE ENCOUNTER
RN returned call to patient to triage for Paxlovid.   Patient states he did own research and would like to decline Paxlovid and treat with OTC.   Discussed that he is on day 5 of symptoms and would not be eligible after today for treatment - patient expressed understanding.   Will follow up if worsening or not improving.

## 2025-05-19 NOTE — PROGRESS NOTES
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 Likely viral, not strep due to presence of post nasal drip. Differential diagnosis includes COVID-19.  - Check CBC to assess for bacterial infection. If bacterial, antibiotics may be prescribed. COVID-19 vaccine not recommended today due to current illness.     Consent was obtained from the patient to use an AI documentation tool in the creation of  this note.  Voice recognition software was also used.  There may be associated transcribing errors.                Arabella Gayle is a 80 year old, presenting for the following health issues:  Nasal Congestion (Pt c/o congestion, sneezing, head plugged, loss of appetite x 5 days. He was at a  where there was 100 people. He had a fever a chills last night. He states he just does not feel good and does not feel like eating. ) and Diabetes (He notes his blood sugar has not been less than 200 which he notes is typical when he doesn't feel good. Right now he is at 290 and he states he has not eaten since last night. )        2025    10:08 AM   Additional Questions   Roomed by Courtney Malloy, 80 years old, male  - Blood sugars have been consistently above 200 for the past five days  - Last recorded blood sugar level was 292  - Symptoms include sore throat, runny nose, headache, sinus pain, cough, and loss of appetite  - Loss of appetite has led to not eating since the previous night  - History of shingles two years ago                  Objective    /60 (BP Location: Right arm, Patient Position: Sitting)   Pulse 105    "Temp 98.8  F (37.1  C) (Tympanic)   Resp 16   Ht 1.702 m (5' 7\")   Wt 87.1 kg (192 lb 1.6 oz)   SpO2 97%   BMI 30.09 kg/m    Body mass index is 30.09 kg/m .  Physical Exam         General: alert and oriented ×3 no acute distress.    HEENT: Normocephalic and atraumatic.   Eyes pupils are equal round and reactive to light     Hearing is grossly normal     Nares are patent there is no rhinorrhea.     Mucous membranes are moist and pink.  Posterior pharyngeal cobblestoning, pharyngeal erythema    Chest: has bilateral rise with no increased work of breathing. clear to auscultation without wheezes, rhonchi, or rales.    Cardiovascular: normal perfusion and brisk capillary refill. S1S2 with regular rate and rhythm and no murmurs, gallops or rubs.    Musculoskeletal: no gross focal abnormalities and normal gait.    Neuro: no gross focal abnormalities and memory seems intact. CN 2-12 are grossly intact.    Psychiatric: speech is clear and coherent and fluent. Patient dressed appropriately for the weather. Mood is appropriate and affect is full.                Signed Electronically by: Mandy Cabrera MD    "

## 2025-05-21 ENCOUNTER — VIRTUAL VISIT (OUTPATIENT)
Dept: EDUCATION SERVICES | Facility: CLINIC | Age: 80
End: 2025-05-21
Payer: COMMERCIAL

## 2025-05-21 DIAGNOSIS — E10.3293 TYPE 1 DIABETES MELLITUS WITH MILD NONPROLIFERATIVE RETINOPATHY OF BOTH EYES WITHOUT MACULAR EDEMA (H): Primary | ICD-10-CM

## 2025-05-21 PROCEDURE — 99207 PR NO CHARGE LOS: CPT | Mod: 95 | Performed by: DIETITIAN, REGISTERED

## 2025-05-21 PROCEDURE — G0108 DIAB MANAGE TRN  PER INDIV: HCPCS | Mod: 95 | Performed by: DIETITIAN, REGISTERED

## 2025-05-21 NOTE — LETTER
5/21/2025         RE: Irwin Malloy  [address] [id number]        Dear Colleague,    Thank you for referring your patient, Irwin Malloy, to the [facility]. Please see a copy of my visit note below.    Diabetes Self-Management Education & Support    Presents for: Type I Diabetes     Type of service:  Video Visit    If the video visit is dropped, the video visit invitation should be resent by: Text to cell phone: [phone]    Originating Location (pt. Location): Home  Distant Location (provider location): [facility]  Mode of Communication:  Video Conference    Video Start Time: 9:15  Video End Time (time video stopped): 9:47    How would patient like to obtain AVS? MyChart      Assessment  5/21/2025 Patient with virtual visit today due to positive COVID.  Patient reports that diabetes has been under poor control and difficult to control since the start of COVID.  Patient is using tandem X2 insulin pump with Dexcom G7 control IQ technology.  Patient is having to give extra boluses due to hyperglycemia.  Reviewed safety and option to give bolus via syringe if needed.  Decreased appetite - provided nutrition recommendations.      Unfortunately patient does not use any pump data on phone therefore unable to link pump remotely.  Was able to link Dexcom clarity account showing 7-day glucose average 204 mg/dL significantly elevated due to illness. (90-day average 175 mg/dL)    Patient's most recent   Lab Results   Component Value Date    A1C 7.2 05/13/2025    A1C 7.5 11/11/2024    A1C 8.3 04/22/2024    A1C 7.9 11/06/2023    A1C 7.6 05/16/2023     is meeting goal of <8.0    Diabetes knowledge and skills assessment:   Patient is knowledgeable in diabetes management concepts related to: Healthy Eating, Being Active, Monitoring, Taking Medication, Problem Solving, Reducing Risks, Healthy Coping, and Insulin Pump Concepts -- Balancing  glucose and insulin - carbohydrate counting, bolus calculator, ICR, ISF, timing of insulin delivery, troubleshooting missed boluses   -- Problem solving with insulin pump therapy - risk of pump failure, MDI back up plan, risk of DKA, keeping adequate supplies on hand, risk of hypoglycemia, exiting and re-entering automated delivery modes     Based on learning assessment above, most appropriate setting for further diabetes education would be: Individual setting.    Care Plan and Education Provided:  Healthy Eating: Carbohydrate Counting and calorie and protein needs for energy patient to use dietary lower carb nutrition supplement and additional vitamins during illness     Being Active: Relationship of activity to glucose,     Monitoring: Blood glucose versus Continuous Glucose Monitoring, Frequency of monitoring, and Individual glucose targets,     Taking Medication: Importance of bolusing 15 to 30 minutes prior to meals especially when glucose readings are over 150,     Problem Solving: Rule of 15 and carrying a carbohydrate source at all times in case of low glucose,     Reducing Risks: Goal for A1c, how it relates to glucose and how often to check, and     Healthy Coping: Identifying helpful resources    Patient verbalized understanding of diabetes self-management education concepts discussed, opportunities for ongoing education and support, and recommendations provided today.    Plan  Patient to incorporate nutrition supplements and vitamins during illness    Next visit will download pump and potentially change correction factor over night and pm basal rate due to no longer snacking but glucose is dropping     Topics to cover at upcoming visits: Any area as needed for ongoing diabetes self-management education and support/ motivational counseling.    See Care Plan for co-developed, patient-state behavior change goals.    Education Materials Provided:  No new materials provided  "today      Subjective/Objective  Irwin is an 80 year old, presenting for the following diabetes education related to: Insulin Pump and CGM Review  Accompanied by: Self  Diabetes education in the past 24mo: (Patient-Rptd) Yes  Focus of Visit: Insulin Pump  Diabetes type: (Patient-Rptd) Type 1  Disease course: (Patient-Rptd) Stable  How confident are you filling out medical forms by yourself:: Extremely  Transportation concerns: No  Difficulty affording diabetes medication?: No  Difficulty affording diabetes testing supplies?: No  Other concerns: None  Cultural Influences/Ethnic Background:  Not  or     Diabetes Symptoms & Complications:  Diabetes Related Symptoms: (Patient-Rptd) None  Weight trend: (Patient-Rptd) Stable  Symptom course: (Patient-Rptd) Stable  Disease course: (Patient-Rptd) Stable  Complications assessed today?: Yes  Autonomic neuropathy: No  CVA: No  Heart disease: No  Nephropathy: No  Peripheral neuropathy: No  Peripheral Vascular Disease: No  Retinopathy: Yes    Patient Problem List and Family Medical History reviewed for relevant medical history, current medical status, and diabetes risk factors.    Vitals:  There were no vitals taken for this visit.  Estimated body mass index is 30.09 kg/m  as calculated from the following:    Height as of 5/19/25: 1.702 m (5' 7\").    Weight as of 5/19/25: 87.1 kg (192 lb 1.6 oz).   Last 3 BP:   BP Readings from Last 3 Encounters:   05/19/25 104/60   05/13/25 132/78   11/13/24 135/75       History   Smoking Status     Former     Types: Cigarettes   Smokeless Tobacco     Never       Labs:  Lab Results   Component Value Date    A1C 7.2 05/13/2025     Lab Results   Component Value Date     11/11/2024     08/19/2021     Lab Results   Component Value Date     11/11/2024     Direct Measure HDL   Date Value Ref Range Status   11/11/2024 60 >=40 mg/dL Final     GFR Estimate   Date Value Ref Range Status   11/11/2024 88 >60 mL/min/1.73m2 " "Final     Comment:     eGFR calculated using 2021 CKD-EPI equation.     No results found for: \"GFRESTBLACK\"  Lab Results   Component Value Date    CR 0.87 11/11/2024     Lab Results   Component Value Date    MICROL <12.0 11/11/2024    UMALCR  11/11/2024      Comment:      Unable to calculate, urine albumin and/or urine creatinine is outside detectable limits.  Microalbuminuria is defined as an albumin:creatinine ratio of 17 to 299 for males and 25 to 299 for females. A ratio of albumin:creatinine of 300 or higher is indicative of overt proteinuria.  Due to biologic variability, positive results should be confirmed by a second, first-morning random or 24-hour timed urine specimen. If there is discrepancy, a third specimen is recommended. When 2 out of 3 results are in the microalbuminuria range, this is evidence for incipient nephropathy and warrants increased efforts at glucose control, blood pressure control, and institution of therapy with an angiotensin-converting-enzyme (ACE) inhibitor (if the patient can tolerate it).      UCRR 41.7 11/11/2024 5/16/2025   Healthy Eating   Healthy Eating Assessed Today Yes   Cultural/Jew diet restrictions? No   Do you have any food allergies or intolerances? No   Meal planning/habits Carb counting;Heart healthy;Smaller portions   Who cooks/prepares meals for you? Spouse;Self   Who purchases food in  your home? Self;Spouse   How many times a week on average do you eat food made away from home (restaurant/take-out)? 2   Meals include Breakfast;Lunch;Dinner   Beverages Water;Coffee;Milk         5/16/2025   Being Active   Being Active Assessed Today Yes   Exercise: Yes   Days per week of moderate to strenuous exercise (like a brisk walk) 4   On average, minutes per day of exercise at this level 40   How intense was your typical exercise?  Light (like stretching or slow walking)   Exercise Minutes per Week 160   Barrier to exercise None         5/16/2025   Monitoring "   Monitoring Assessed Today Yes   Did patient bring glucose meter to appointment?  Yes   Blood Glucose Meter CGM   Times checking blood sugar at home (number) Other (see Comments)   Please elaborate: Pump   Times checking blood sugar at home (per) Day   Blood glucose trend Fluctuating         Diabetes Medication(s)       Insulin       insulin aspart (NOVOLOG VIAL) 100 UNITS/ML vial INJECT UP TO 70 UNITS DAILY VIA PUMP              5/16/2025   Taking Medications   Taking Medication Assessed Today Yes   Current Treatments Insulin Pump   Problems taking diabetes medications regularly? No   Diabetes medication side effects? No         5/16/2025   Problem Solving   Problem Solving Assessed Today Yes   Is the patient at risk for hypoglycemia? Yes   Hypoglycemia Frequency Weekly   Hypoglycemia Treatment Glucose (tablets or gel);Juice;Candy   Medical ID Yes   Does patient have glucagon emergency kit? No   Is the patient at risk for DKA? Yes   Does patient have ketone test strips? No   Does patient have DKA prevention plan? Yes   Does patient have severe weather/disaster plan for diabetes management? Yes   Does patient have sick day plan for diabetes management? Yes   Hypoglycemia Dizziness/Lightheadedness;Feeling shaky;Sweats   Hypoglycemia Complications None           5/16/2025   Reducing Risks   Reducing Risks Assessed Today Yes   Diabetes Risks Age over 45 years;Hyperlipidemia   CAD Risks Diabetes Mellitus;Male sex;Dyslipidemia   Has dilated eye exam at least once a year? Yes   Sees dentist every 6 months? Yes   Feet checked by healthcare provider in the last year? Yes         5/16/2025   Healthy Coping: Diabetes Distress Assessment   Healthy Coping Assessed Today Yes   I feel burned out by all of the attention and effort that diabetes demands of me. 1 - Not a Problem   It bothers me that diabetes seems to control my life. 1 - Not a Problem   I am frustrated that even when I do what I am supposed to for my diabetes, it  doesn't seem to make a difference. 1 - Not a Problem   No matter how hard I try with my diabetes, it feels like it will never be good enough. 1 - Not a Problem   I am so tired of having to worry about diabetes all the time. 1 - Not a Problem   When it comes to my diabetes, I often feel like a failure. 1 - Not a Problem   It depresses me when I realize that my diabetes will likely never go away. 1 - Not a Problem   Living with diabetes is overwhelming for me. 1 - Not a Problem   T2 DDAS Total Score (0 - 1.9 Little or no DD, 2.0 - 2.9 Moderate DD,  3.0+ High DD) 1    Informal Support system: Spouse       Patient-reported       Rupal White RD, CD, [facility]     Time Spent: 30 minutes  Encounter Type: Individual    Any diabetes medication dose changes were made via the [facility] Standing Orders under the patient's referring provider.

## 2025-05-21 NOTE — PROGRESS NOTES
Diabetes Self-Management Education & Support    Presents for: Type I Diabetes     Type of service:  Video Visit    If the video visit is dropped, the video visit invitation should be resent by: Text to cell phone: [phone]    Originating Location (pt. Location): Home  Distant Location (provider location): [facility]  Mode of Communication:  Video Conference    Video Start Time: 9:15  Video End Time (time video stopped): 9:47    How would patient like to obtain AVS? MyChart      Assessment  5/21/2025 Patient with virtual visit today due to positive COVID.  Patient reports that diabetes has been under poor control and difficult to control since the start of COVID.  Patient is using tandem X2 insulin pump with Dexcom G7 control IQ technology.  Patient is having to give extra boluses due to hyperglycemia.  Reviewed safety and option to give bolus via syringe if needed.  Decreased appetite - provided nutrition recommendations.      Unfortunately patient does not use any pump data on phone therefore unable to link pump remotely.  Was able to link Dexcom clarity account showing 7-day glucose average 204 mg/dL significantly elevated due to illness. (90-day average 175 mg/dL)    Patient's most recent   Lab Results   Component Value Date    A1C 7.2 05/13/2025    A1C 7.5 11/11/2024    A1C 8.3 04/22/2024    A1C 7.9 11/06/2023    A1C 7.6 05/16/2023     is meeting goal of <8.0    Diabetes knowledge and skills assessment:   Patient is knowledgeable in diabetes management concepts related to: Healthy Eating, Being Active, Monitoring, Taking Medication, Problem Solving, Reducing Risks, Healthy Coping, and Insulin Pump Concepts -- Balancing glucose and insulin - carbohydrate counting, bolus calculator, ICR, ISF, timing of insulin delivery, troubleshooting missed boluses   -- Problem solving with insulin pump therapy - risk of pump failure, MDI back up plan, risk of DKA, keeping adequate supplies on  hand, risk of hypoglycemia, exiting and re-entering automated delivery modes     Based on learning assessment above, most appropriate setting for further diabetes education would be: Individual setting.    Care Plan and Education Provided:  Healthy Eating: Carbohydrate Counting and calorie and protein needs for energy patient to use dietary lower carb nutrition supplement and additional vitamins during illness     Being Active: Relationship of activity to glucose,     Monitoring: Blood glucose versus Continuous Glucose Monitoring, Frequency of monitoring, and Individual glucose targets,     Taking Medication: Importance of bolusing 15 to 30 minutes prior to meals especially when glucose readings are over 150,     Problem Solving: Rule of 15 and carrying a carbohydrate source at all times in case of low glucose,     Reducing Risks: Goal for A1c, how it relates to glucose and how often to check, and     Healthy Coping: Identifying helpful resources    Patient verbalized understanding of diabetes self-management education concepts discussed, opportunities for ongoing education and support, and recommendations provided today.    Plan  Patient to incorporate nutrition supplements and vitamins during illness    Next visit will download pump and potentially change correction factor over night and pm basal rate due to no longer snacking but glucose is dropping     Topics to cover at upcoming visits: Any area as needed for ongoing diabetes self-management education and support/ motivational counseling.    See Care Plan for co-developed, patient-state behavior change goals.    Education Materials Provided:  No new materials provided today      Subjective/Objective  Irwin is an 80 year old, presenting for the following diabetes education related to: Insulin Pump and CGM Review  Accompanied by: Self  Diabetes education in the past 24mo: (Patient-Rptd) Yes  Focus of Visit: Insulin Pump  Diabetes type: (Patient-Rptd) Type  "1  Disease course: (Patient-Rptd) Stable  How confident are you filling out medical forms by yourself:: Extremely  Transportation concerns: No  Difficulty affording diabetes medication?: No  Difficulty affording diabetes testing supplies?: No  Other concerns: None  Cultural Influences/Ethnic Background:  Not  or     Diabetes Symptoms & Complications:  Diabetes Related Symptoms: (Patient-Rptd) None  Weight trend: (Patient-Rptd) Stable  Symptom course: (Patient-Rptd) Stable  Disease course: (Patient-Rptd) Stable  Complications assessed today?: Yes  Autonomic neuropathy: No  CVA: No  Heart disease: No  Nephropathy: No  Peripheral neuropathy: No  Peripheral Vascular Disease: No  Retinopathy: Yes    Patient Problem List and Family Medical History reviewed for relevant medical history, current medical status, and diabetes risk factors.    Vitals:  There were no vitals taken for this visit.  Estimated body mass index is 30.09 kg/m  as calculated from the following:    Height as of 5/19/25: 1.702 m (5' 7\").    Weight as of 5/19/25: 87.1 kg (192 lb 1.6 oz).   Last 3 BP:   BP Readings from Last 3 Encounters:   05/19/25 104/60   05/13/25 132/78   11/13/24 135/75       History   Smoking Status    Former    Types: Cigarettes   Smokeless Tobacco    Never       Labs:  Lab Results   Component Value Date    A1C 7.2 05/13/2025     Lab Results   Component Value Date     11/11/2024     08/19/2021     Lab Results   Component Value Date     11/11/2024     Direct Measure HDL   Date Value Ref Range Status   11/11/2024 60 >=40 mg/dL Final     GFR Estimate   Date Value Ref Range Status   11/11/2024 88 >60 mL/min/1.73m2 Final     Comment:     eGFR calculated using 2021 CKD-EPI equation.     No results found for: \"GFRESTBLACK\"  Lab Results   Component Value Date    CR 0.87 11/11/2024     Lab Results   Component Value Date    MICROL <12.0 11/11/2024    UMALCR  11/11/2024      Comment:      Unable to calculate, " urine albumin and/or urine creatinine is outside detectable limits.  Microalbuminuria is defined as an albumin:creatinine ratio of 17 to 299 for males and 25 to 299 for females. A ratio of albumin:creatinine of 300 or higher is indicative of overt proteinuria.  Due to biologic variability, positive results should be confirmed by a second, first-morning random or 24-hour timed urine specimen. If there is discrepancy, a third specimen is recommended. When 2 out of 3 results are in the microalbuminuria range, this is evidence for incipient nephropathy and warrants increased efforts at glucose control, blood pressure control, and institution of therapy with an angiotensin-converting-enzyme (ACE) inhibitor (if the patient can tolerate it).      UCRR 41.7 11/11/2024 5/16/2025   Healthy Eating   Healthy Eating Assessed Today Yes   Cultural/Anglican diet restrictions? No   Do you have any food allergies or intolerances? No   Meal planning/habits Carb counting;Heart healthy;Smaller portions   Who cooks/prepares meals for you? Spouse;Self   Who purchases food in  your home? Self;Spouse   How many times a week on average do you eat food made away from home (restaurant/take-out)? 2   Meals include Breakfast;Lunch;Dinner   Beverages Water;Coffee;Milk         5/16/2025   Being Active   Being Active Assessed Today Yes   Exercise: Yes   Days per week of moderate to strenuous exercise (like a brisk walk) 4   On average, minutes per day of exercise at this level 40   How intense was your typical exercise?  Light (like stretching or slow walking)   Exercise Minutes per Week 160   Barrier to exercise None         5/16/2025   Monitoring   Monitoring Assessed Today Yes   Did patient bring glucose meter to appointment?  Yes   Blood Glucose Meter CGM   Times checking blood sugar at home (number) Other (see Comments)   Please elaborate: Pump   Times checking blood sugar at home (per) Day   Blood glucose trend Fluctuating          Diabetes Medication(s)       Insulin       insulin aspart (NOVOLOG VIAL) 100 UNITS/ML vial INJECT UP TO 70 UNITS DAILY VIA PUMP              5/16/2025   Taking Medications   Taking Medication Assessed Today Yes   Current Treatments Insulin Pump   Problems taking diabetes medications regularly? No   Diabetes medication side effects? No         5/16/2025   Problem Solving   Problem Solving Assessed Today Yes   Is the patient at risk for hypoglycemia? Yes   Hypoglycemia Frequency Weekly   Hypoglycemia Treatment Glucose (tablets or gel);Juice;Candy   Medical ID Yes   Does patient have glucagon emergency kit? No   Is the patient at risk for DKA? Yes   Does patient have ketone test strips? No   Does patient have DKA prevention plan? Yes   Does patient have severe weather/disaster plan for diabetes management? Yes   Does patient have sick day plan for diabetes management? Yes   Hypoglycemia Dizziness/Lightheadedness;Feeling shaky;Sweats   Hypoglycemia Complications None           5/16/2025   Reducing Risks   Reducing Risks Assessed Today Yes   Diabetes Risks Age over 45 years;Hyperlipidemia   CAD Risks Diabetes Mellitus;Male sex;Dyslipidemia   Has dilated eye exam at least once a year? Yes   Sees dentist every 6 months? Yes   Feet checked by healthcare provider in the last year? Yes         5/16/2025   Healthy Coping: Diabetes Distress Assessment   Healthy Coping Assessed Today Yes   I feel burned out by all of the attention and effort that diabetes demands of me. 1 - Not a Problem   It bothers me that diabetes seems to control my life. 1 - Not a Problem   I am frustrated that even when I do what I am supposed to for my diabetes, it doesn't seem to make a difference. 1 - Not a Problem   No matter how hard I try with my diabetes, it feels like it will never be good enough. 1 - Not a Problem   I am so tired of having to worry about diabetes all the time. 1 - Not a Problem   When it comes to my diabetes, I often feel  like a failure. 1 - Not a Problem   It depresses me when I realize that my diabetes will likely never go away. 1 - Not a Problem   Living with diabetes is overwhelming for me. 1 - Not a Problem   T2 DDAS Total Score (0 - 1.9 Little or no DD, 2.0 - 2.9 Moderate DD,  3.0+ High DD) 1    Informal Support system: Spouse       Patient-reported       Rupal White RD, CD, [facility]     Time Spent: 30 minutes  Encounter Type: Individual    Any diabetes medication dose changes were made via the [facility] Standing Orders under the patient's referring provider.

## 2025-06-10 ENCOUNTER — OFFICE VISIT (OUTPATIENT)
Dept: FAMILY MEDICINE | Facility: CLINIC | Age: 80
End: 2025-06-10
Payer: COMMERCIAL

## 2025-06-10 VITALS
RESPIRATION RATE: 16 BRPM | WEIGHT: 190 LBS | DIASTOLIC BLOOD PRESSURE: 76 MMHG | TEMPERATURE: 97.1 F | HEART RATE: 87 BPM | OXYGEN SATURATION: 98 % | BODY MASS INDEX: 29.82 KG/M2 | HEIGHT: 67 IN | SYSTOLIC BLOOD PRESSURE: 128 MMHG

## 2025-06-10 DIAGNOSIS — H25.9 AGE-RELATED CATARACT OF BOTH EYES, UNSPECIFIED AGE-RELATED CATARACT TYPE: ICD-10-CM

## 2025-06-10 DIAGNOSIS — Z20.822 EXPOSURE TO 2019 NOVEL CORONAVIRUS: ICD-10-CM

## 2025-06-10 DIAGNOSIS — E10.3593 TYPE 1 DIABETES MELLITUS WITH PROLIFERATIVE RETINOPATHY OF BOTH EYES WITHOUT MACULAR EDEMA (H): ICD-10-CM

## 2025-06-10 DIAGNOSIS — Z01.818 PRE-OP EXAM: Primary | ICD-10-CM

## 2025-06-10 LAB
ANION GAP SERPL CALCULATED.3IONS-SCNC: 10 MMOL/L (ref 7–15)
BUN SERPL-MCNC: 15.1 MG/DL (ref 8–23)
CALCIUM SERPL-MCNC: 9.7 MG/DL (ref 8.8–10.4)
CHLORIDE SERPL-SCNC: 103 MMOL/L (ref 98–107)
CREAT SERPL-MCNC: 0.89 MG/DL (ref 0.67–1.17)
EGFRCR SERPLBLD CKD-EPI 2021: 87 ML/MIN/1.73M2
GLUCOSE SERPL-MCNC: 57 MG/DL (ref 70–99)
HCO3 SERPL-SCNC: 27 MMOL/L (ref 22–29)
POTASSIUM SERPL-SCNC: 4.4 MMOL/L (ref 3.4–5.3)
SODIUM SERPL-SCNC: 140 MMOL/L (ref 135–145)

## 2025-06-10 PROCEDURE — 3074F SYST BP LT 130 MM HG: CPT | Performed by: INTERNAL MEDICINE

## 2025-06-10 PROCEDURE — 3078F DIAST BP <80 MM HG: CPT | Performed by: INTERNAL MEDICINE

## 2025-06-10 PROCEDURE — 99214 OFFICE O/P EST MOD 30 MIN: CPT | Performed by: INTERNAL MEDICINE

## 2025-06-10 PROCEDURE — 36415 COLL VENOUS BLD VENIPUNCTURE: CPT | Performed by: INTERNAL MEDICINE

## 2025-06-10 PROCEDURE — 80048 BASIC METABOLIC PNL TOTAL CA: CPT | Performed by: INTERNAL MEDICINE

## 2025-06-10 NOTE — ASSESSMENT & PLAN NOTE
type I DM, controlled, late onset diabetes in adulthood (ROLANDO)  hypoglycemic medications:  insulin therapy:  Tandem pump (initiated in '22; Medtronic prior),  started on Dexcom6 CGM (9/2020), May, 2022 - changed to Tandem G7 sensor with auto-basal features   - needs to avoid late-evening rebolusing  last HbA1c: 7.1%   microvascular complications: +retinopathy, + BHARATH x1  macrovascular complications: none  ASA/SIMON/statin:  ASA 81mg daily, losartan 50mg daily, pravastatin 20mg at bedtime    Patient has met criteria for Continuous Glucose Monitoring (CGM) coverage:   - Patient has type one diabetes mellitus; and   - patient has been using a blood glucose monitor and performing four or more blood glucose test per day; and   - patient is insulin treated with multiple daily injections; and   - patient is requiring frequent insulin adjustments on the basis of blood glucose readings.   - Within six months prior to ordering the CGM, the patient has had an in-person visit with the practitioner; and determined that criteria (1-4) above are met; and   - every six months following the initial prescription of the CGM, the treating practitioner has an in-person visit with the patient to assess adherence to their CGM regimen and diabetes treatment plan.

## 2025-06-10 NOTE — PROGRESS NOTES
Preoperative Evaluation  [address] [address]  Phone: [phone]  Fax: [phone]  Primary Provider: Jose Mcqueen MD  Pre-op Performing Provider: Jose Mcqueen MD  Pedrito 10, 2025             6/5/2025   Surgical Information   What procedure is being done? Right cataract 6/18/2025 and Left cataract 7/9/2025   Facility or Hospital where procedure/surgery will be performed: [facility]   Who is doing the procedure / surgery? Dr Avalos   Date of surgery / procedure: 6/18/2025 and 7/9/2025   Time of surgery / procedure: TBD   Where do you plan to recover after surgery? Other - @ home     Fax number for surgical facility: Note does not need to be faxed, will be available electronically in Epic.    Assessment & Plan     The proposed surgical procedure is considered LOW risk.    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 (H)    type I DM, controlled, late onset diabetes in adulthood (ROLANDO)  hypoglycemic medications:  insulin therapy:  Tandem pump (initiated in '22; Medtronic prior),  started on Dexcom6 CGM (9/2020), May, 2022 - changed to Tandem G7 sensor with auto-basal features   - needs to avoid late-evening rebolusing  last HbA1c: 7.1%   microvascular complications: +retinopathy, + BHARATH x1  macrovascular complications: none  ASA/SIMON/statin:  ASA 81mg daily, losartan 50mg daily, pravastatin 20mg at bedtime    Patient has met criteria for Continuous Glucose Monitoring (CGM) coverage:   - Patient has type one diabetes mellitus; and   - patient has been using a blood glucose monitor and performing four or more blood glucose test per day; and   - patient is insulin treated with multiple daily injections; and   - patient is requiring frequent insulin adjustments on the basis of blood glucose readings.   - Within six months prior  to ordering the CGM, the patient has had an in-person visit with the practitioner; and determined that criteria (1-4) above are met; and   - every six months following the initial prescription of the CGM, the treating practitioner has an in-person visit with the patient to assess adherence to their CGM regimen and diabetes treatment plan.               Other Visit Diagnoses         Pre-op exam    -  Primary    Relevant Orders    Basic metabolic panel      Age-related cataract of both eyes, unspecified age-related cataract type          Exposure to 2019 novel coronavirus        Recent COVID illness; symptom onset approximately 1 month ago; symptoms since resolved-no implications for these planned elective procedures.                   Risks and Recommendations  The patient has the following additional risks and recommendations for perioperative complications:  Diabetes:  - Patient is on insulin therapy; diabetic NPO guidelines provided and discussed.  Recommending continuation of his insulin pump and continuous glucose monitor without needs for any interruption perioperatively.  If feasible, recommending not to have removal or decannulation of either his sensor or pump    Antiplatelet or Anticoagulation Medication Instructions   - Bleeding risk is low for this procedure (e.g. dental, skin, cataract).    Additional Medication Instructions  Take all scheduled medications on the day of surgery    Recommendation  Approval given to proceed with proposed procedure, without further diagnostic evaluation.        Arabella Gayle is a 80 year old, presenting for the following: Presents for preop evaluation before planned cataract removal in both eyes.  Recently began on a Avastin related to diabetic proliferative retinopathy.  Seen in clinic approximately 3 weeks ago for COVID-like symptoms.  Viral swab testing was positive for COVID.  Was outside time window for antiviral usage.  Symptoms lasted for approximately 2 to 3  weeks; since have resolved without any sequelae.  Stable glycemic control on combination Dexcom continuous glucose monitor and insulin pump.  Pre-Op Exam          6/10/2025     9:08 AM   Additional Questions   Roomed by fawad vila   Accompanied by self     HPI:           6/5/2025   Pre-Op Questionnaire   Have you ever had a heart attack or stroke? No   Have you ever had surgery on your heart or blood vessels, such as a stent placement, a coronary artery bypass, or surgery on an artery in your head, neck, heart, or legs? No   Do you have chest pain with activity? No   Do you have a history of heart failure? No   Do you currently have a cold, bronchitis or symptoms of other infection? No   Do you have a cough, shortness of breath, or wheezing? No   Do you or anyone in your family have previous history of blood clots? No   Do you or does anyone in your family have a serious bleeding problem such as prolonged bleeding following surgeries or cuts? No   Have you ever had problems with anemia or been told to take iron pills? No   Have you had any abnormal blood loss such as black, tarry or bloody stools? No   Have you ever had a blood transfusion? No   Are you willing to have a blood transfusion if it is medically needed before, during, or after your surgery? Yes   Have you or any of your relatives ever had problems with anesthesia? No   Do you have sleep apnea, excessive snoring or daytime drowsiness? No   Do you have any artifical heart valves or other implanted medical devices like a pacemaker, defibrillator, or continuous glucose monitor? No   Do you have artificial joints? No   Are you allergic to latex? No     Advance Care Planning    Patient states has Health Care Directive and will send to [facility].        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 (H) 03/04/2022     Priority: Medium    Traumatic complete tear of left rotator cuff 08/23/2021     Priority: Medium      Past Medical History:   Diagnosis Date    Essential tremor 6/21/2022    Hypothyroidism due to Hashimoto's thyroiditis 6/21/2022    Pure hypercholesterolemia 6/21/2022    Type 1 diabetes mellitus with hyperglycemia (H) 3/4/2022     Past Surgical History:   Procedure Laterality Date    ARTHROSCOPY SHOULDER ROTATOR CUFF REPAIR  05/03/2021    ARTHROSCOPY SHOULDER ROTATOR CUFF REPAIR  08/23/2021    COLONOSCOPY  06/27/2019    COLONOSCOPY - HIM SCAN  12/07/2015    HC CT COLONOGRAPHY SCREEN  05/20/2019     SIALOGRAM  02/01/2006    INSULIN PUMP INITIATION      no date given    MANDIBULAR AND MAXILLARY OSTEOTOMY  12/2005    SIGMOIDECTOMY  10/17/2002     Current Outpatient Medications   Medication Sig Dispense Refill    aspirin (ASA) 81 MG EC tablet Take 81 mg by mouth daily      blood glucose (CONTOUR NEXT TEST) test strip Use to test blood sugar 4 times daily or as directed. 400 strip 3    Continuous Glucose Sensor (DEXCOM G7 SENSOR) MISC 1 each every 10 days. Change every 10 days.      insulin aspart (NOVOLOG VIAL) 100 UNITS/ML vial INJECT UP TO 70 UNITS DAILY VIA PUMP 90 mL 1    levothyroxine (SYNTHROID/LEVOTHROID) 200 MCG tablet Take 1 tablet (200 mcg) by mouth daily. 90 tablet 3    losartan (COZAAR) 50 MG tablet TAKE 1 TABLET EVERY DAY 90 tablet 3    omeprazole (PRILOSEC OTC) 20 MG EC tablet Take 1 tablet (20 mg) by mouth daily. 21 tablet 0    pravastatin (PRAVACHOL) 20 MG tablet Take 1 tablet (20 mg) by mouth daily. 90 tablet 3    psyllium (METAMUCIL/KONSYL) Packet Take 1 packet by mouth  "daily         No Known Allergies     Social History     Tobacco Use    Smoking status: Former     Current packs/day: 0.00     Types: Cigarettes     Quit date: 2008     Years since quittin.4     Passive exposure: Past    Smokeless tobacco: Never   Substance Use Topics    Alcohol use: Not on file     Family History   Problem Relation Age of Onset    Uterine Cancer Mother          at age: 72 years    Irritable Bowel Syndrome Father          at age: 64 years    Diabetes Type 1 Father      History   Drug Use Not on file             Review of Systems  Constitutional, HEENT, cardiovascular, pulmonary, gi and gu systems are negative, except as otherwise noted.    Objective    /76 (BP Location: Right arm, Patient Position: Sitting)   Pulse 87   Temp 97.1  F (36.2  C) (Tympanic)   Resp 16   Ht 1.702 m (5' 7\")   Wt 86.2 kg (190 lb)   SpO2 98%   BMI 29.76 kg/m     Estimated body mass index is 29.76 kg/m  as calculated from the following:    Height as of this encounter: 1.702 m (5' 7\").    Weight as of this encounter: 86.2 kg (190 lb).  Physical Exam  GENERAL: alert and no distress  NECK: no adenopathy, no asymmetry, masses, or scars  RESP: lungs clear to auscultation - no rales, rhonchi or wheezes  CV: regular rate and rhythm, normal S1 S2, no S3 or S4, no murmur, click or rub, no peripheral edema  ABDOMEN: soft, nontender, no hepatosplenomegaly, no masses and bowel sounds normal  MS: no gross musculoskeletal defects noted, no edema    Recent Labs   Lab Test 25  1041 25  1057 24  0933   HGB 13.1*  --   --      --   --    NA  --   --  135   POTASSIUM  --   --  4.4   CR  --   --  0.87   A1C  --  7.2* 7.5*        Diagnostics  Labs pending at this time.  Results will be reviewed when available.   No EKG required for low risk surgery (cataract, skin procedure, breast biopsy, etc).    Revised Cardiac Risk Index (RCRI)  The patient has the following serious cardiovascular " risks for perioperative complications:   - Diabetes Mellitus (on Insulin) = 1 point     RCRI Interpretation: 1 point: Class II (low risk - 0.9% complication rate)         Signed Electronically by: Jose Mcqueen MD  A copy of this evaluation report is provided to the requesting physician.

## 2025-07-14 ENCOUNTER — MEDICAL CORRESPONDENCE (OUTPATIENT)
Dept: HEALTH INFORMATION MANAGEMENT | Facility: CLINIC | Age: 80
End: 2025-07-14
Payer: COMMERCIAL

## 2025-07-17 RX ORDER — INSULIN PMP CART,AUT,G6/7,CNTR
EACH SUBCUTANEOUS
Status: CANCELLED | OUTPATIENT
Start: 2025-07-17

## 2025-07-17 NOTE — TELEPHONE ENCOUNTER
Nothing in recent notes or orders from PCP or diabetes educator specific to these orders. Will discuss with Keshia White upon her return next week to determine if this was the planned next step  Dr. Orozco    ~ I called pt to discuss.  Pt does not want to pursue Omnipod insulin pump at this time due to prescription cost.  No treatment changes at this time.    Rupal White RD, CD, [facility]

## 2025-07-17 NOTE — TELEPHONE ENCOUNTER
Medication Question or Refill    Omni Pod 5 - Intro Kit  Omni Pod 5 - Pod Refills       Pharmacy calling stating they sent over PA request on 7/15/2025 will be faxing over the cover my meds request again. This is not currently an active item on their medication list.     Preferred Pharmacy:       [facility]   [phone] - this number didn't pull up [facility].

## 2025-07-23 NOTE — TELEPHONE ENCOUNTER
Per conversation with Keshia White the request for Omnipod was sent in error by the pharmacy and patient is not interested in pursuing this.  Request canceled

## 2025-08-24 ENCOUNTER — HEALTH MAINTENANCE LETTER (OUTPATIENT)
Age: 80
End: 2025-08-24